# Patient Record
Sex: FEMALE | Race: WHITE | NOT HISPANIC OR LATINO | Employment: FULL TIME | URBAN - METROPOLITAN AREA
[De-identification: names, ages, dates, MRNs, and addresses within clinical notes are randomized per-mention and may not be internally consistent; named-entity substitution may affect disease eponyms.]

---

## 2017-01-31 ENCOUNTER — OFFICE VISIT (OUTPATIENT)
Dept: OBSTETRICS AND GYNECOLOGY | Facility: CLINIC | Age: 27
End: 2017-01-31
Payer: MEDICAID

## 2017-01-31 VITALS
HEART RATE: 95 BPM | BODY MASS INDEX: 36.51 KG/M2 | HEIGHT: 64 IN | DIASTOLIC BLOOD PRESSURE: 81 MMHG | SYSTOLIC BLOOD PRESSURE: 116 MMHG | WEIGHT: 213.88 LBS

## 2017-01-31 DIAGNOSIS — L65.9 HAIR LOSS: ICD-10-CM

## 2017-01-31 DIAGNOSIS — R10.2 PELVIC PAIN: ICD-10-CM

## 2017-01-31 DIAGNOSIS — R53.83 FATIGUE, UNSPECIFIED TYPE: ICD-10-CM

## 2017-01-31 DIAGNOSIS — N64.4 BREAST PAIN, LEFT: Primary | ICD-10-CM

## 2017-01-31 DIAGNOSIS — N64.52 NIPPLE DISCHARGE: ICD-10-CM

## 2017-01-31 PROCEDURE — 99203 OFFICE O/P NEW LOW 30 MIN: CPT | Mod: PBBFAC,PO | Performed by: NURSE PRACTITIONER

## 2017-01-31 PROCEDURE — 99999 PR PBB SHADOW E&M-NEW PATIENT-LVL III: CPT | Mod: PBBFAC,,, | Performed by: NURSE PRACTITIONER

## 2017-01-31 PROCEDURE — 99202 OFFICE O/P NEW SF 15 MIN: CPT | Mod: S$PBB,,, | Performed by: NURSE PRACTITIONER

## 2017-01-31 RX ORDER — LORAZEPAM 2 MG/1
0.5 TABLET ORAL EVERY 12 HOURS PRN
COMMUNITY
End: 2017-10-02

## 2017-01-31 RX ORDER — VENLAFAXINE HYDROCHLORIDE 75 MG/1
75 CAPSULE, EXTENDED RELEASE ORAL DAILY
COMMUNITY
End: 2017-10-02
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
## 2017-02-02 ENCOUNTER — PATIENT MESSAGE (OUTPATIENT)
Dept: OBSTETRICS AND GYNECOLOGY | Facility: CLINIC | Age: 27
End: 2017-02-02

## 2017-02-07 ENCOUNTER — HOSPITAL ENCOUNTER (OUTPATIENT)
Dept: RADIOLOGY | Facility: HOSPITAL | Age: 27
Discharge: HOME | End: 2017-02-07
Attending: NURSE PRACTITIONER
Payer: MEDICAID

## 2017-02-07 DIAGNOSIS — N64.4 BREAST PAIN: ICD-10-CM

## 2017-02-07 DIAGNOSIS — N64.4 BREAST PAIN, LEFT: ICD-10-CM

## 2017-02-07 DIAGNOSIS — N64.52 NIPPLE DISCHARGE IN FEMALE: ICD-10-CM

## 2017-02-07 DIAGNOSIS — R10.2 PELVIC PAIN: ICD-10-CM

## 2017-02-07 DIAGNOSIS — N64.52 NIPPLE DISCHARGE: ICD-10-CM

## 2017-02-07 PROCEDURE — 76642 ULTRASOUND BREAST LIMITED: CPT | Mod: 26,LT,, | Performed by: RADIOLOGY

## 2017-02-07 PROCEDURE — 76856 US EXAM PELVIC COMPLETE: CPT | Mod: 26,,, | Performed by: RADIOLOGY

## 2017-02-07 PROCEDURE — 76642 ULTRASOUND BREAST LIMITED: CPT | Mod: TC,LT

## 2017-02-07 PROCEDURE — 76830 TRANSVAGINAL US NON-OB: CPT | Mod: 26,,, | Performed by: RADIOLOGY

## 2017-02-07 PROCEDURE — 76856 US EXAM PELVIC COMPLETE: CPT | Mod: TC

## 2017-02-08 ENCOUNTER — PATIENT MESSAGE (OUTPATIENT)
Dept: OBSTETRICS AND GYNECOLOGY | Facility: CLINIC | Age: 27
End: 2017-02-08

## 2017-02-09 ENCOUNTER — PATIENT MESSAGE (OUTPATIENT)
Dept: OBSTETRICS AND GYNECOLOGY | Facility: CLINIC | Age: 27
End: 2017-02-09

## 2017-02-20 ENCOUNTER — PATIENT MESSAGE (OUTPATIENT)
Dept: OBSTETRICS AND GYNECOLOGY | Facility: CLINIC | Age: 27
End: 2017-02-20

## 2017-03-10 ENCOUNTER — APPOINTMENT (OUTPATIENT)
Dept: LAB | Facility: HOSPITAL | Age: 27
End: 2017-03-10
Attending: OBSTETRICS & GYNECOLOGY
Payer: MEDICAID

## 2017-03-10 ENCOUNTER — OFFICE VISIT (OUTPATIENT)
Dept: OBSTETRICS AND GYNECOLOGY | Facility: CLINIC | Age: 27
End: 2017-03-10
Payer: MEDICAID

## 2017-03-10 VITALS
DIASTOLIC BLOOD PRESSURE: 72 MMHG | TEMPERATURE: 99 F | WEIGHT: 225.5 LBS | HEART RATE: 100 BPM | BODY MASS INDEX: 38.5 KG/M2 | HEIGHT: 64 IN | SYSTOLIC BLOOD PRESSURE: 113 MMHG

## 2017-03-10 DIAGNOSIS — D07.1 VIN III (VULVAR INTRAEPITHELIAL NEOPLASIA III): ICD-10-CM

## 2017-03-10 DIAGNOSIS — N76.3 CHRONIC VULVITIS: ICD-10-CM

## 2017-03-10 DIAGNOSIS — Z01.419 WELL WOMAN EXAM WITH ROUTINE GYNECOLOGICAL EXAM: Primary | ICD-10-CM

## 2017-03-10 PROCEDURE — 88305 TISSUE EXAM BY PATHOLOGIST: CPT | Mod: 26,,, | Performed by: PATHOLOGY

## 2017-03-10 PROCEDURE — 88175 CYTOPATH C/V AUTO FLUID REDO: CPT

## 2017-03-10 PROCEDURE — 56821 COLPOSCOPY VULVA W/BIOPSY: CPT | Mod: S$PBB,,, | Performed by: OBSTETRICS & GYNECOLOGY

## 2017-03-10 PROCEDURE — 56821 COLPOSCOPY VULVA W/BIOPSY: CPT | Mod: PBBFAC,PO | Performed by: OBSTETRICS & GYNECOLOGY

## 2017-03-10 PROCEDURE — 99999 PR PBB SHADOW E&M-EST. PATIENT-LVL III: CPT | Mod: PBBFAC,,, | Performed by: OBSTETRICS & GYNECOLOGY

## 2017-03-10 PROCEDURE — 88305 TISSUE EXAM BY PATHOLOGIST: CPT | Performed by: PATHOLOGY

## 2017-03-10 PROCEDURE — 88312 SPECIAL STAINS GROUP 1: CPT | Mod: 26,,, | Performed by: PATHOLOGY

## 2017-03-10 PROCEDURE — 99499 UNLISTED E&M SERVICE: CPT | Mod: S$PBB,,, | Performed by: OBSTETRICS & GYNECOLOGY

## 2017-03-10 PROCEDURE — 99213 OFFICE O/P EST LOW 20 MIN: CPT | Mod: PBBFAC,PO,25 | Performed by: OBSTETRICS & GYNECOLOGY

## 2017-03-10 RX ORDER — HYDROCODONE BITARTRATE AND ACETAMINOPHEN 5; 325 MG/1; MG/1
1 TABLET ORAL EVERY 6 HOURS PRN
Qty: 20 TABLET | Refills: 0 | Status: SHIPPED | OUTPATIENT
Start: 2017-03-10 | End: 2017-10-02

## 2017-03-10 NOTE — PROGRESS NOTES
COLPOSCOPY:    Rosa Charles is a 26 y.o. female   presents for colposcopy of the vulva.  Patient's last menstrual period was 2017.. She has a history of BREE III and is having vulvar pain and redness like her last diagnosis.      The abnormal test findings were discussed, as well as HPV infection, need for colposcopy and possible biopsies to determine the plan of care, treatments available, the minimal risk of bleeding and infection with colposcopy, and alternatives to colposcopy and she agrees to proceed.      UPT is negative    COLPOSCOPY EXAM:   TIME OUT PERFORMED.     visible lesion(s) at left introitus    Biopsy was taken.  Pap was done  Hemostasis was obtained with stitch    The patient did tolerate the procedure well.    All collected specimens sent to pathology for histologic analysis.    Post-colposcopy counseling:  The patient was instructed to manage post-colposcopy cramping with NSAIDs or Tylenol, or with a prescription per the medication card.  Avoid intercourse, douching, or tampons in the vagina for at least 2-3 days.  Expect a clumpy blackish discharge due to Monsel's solution application for several days.  Report heavy bleeding, worsening pain or pain that does not respond to above medications, or foul-smelling vaginal discharge. HPV vaccine recommended according to FDA age guidelines.  Importance of follow-up stressed.      Follow up based on colposcopy results.

## 2017-03-16 ENCOUNTER — OFFICE VISIT (OUTPATIENT)
Dept: OBSTETRICS AND GYNECOLOGY | Facility: CLINIC | Age: 27
End: 2017-03-16
Payer: MEDICAID

## 2017-03-16 VITALS
WEIGHT: 223.19 LBS | SYSTOLIC BLOOD PRESSURE: 115 MMHG | BODY MASS INDEX: 38.31 KG/M2 | DIASTOLIC BLOOD PRESSURE: 78 MMHG | HEART RATE: 91 BPM

## 2017-03-16 DIAGNOSIS — Z98.890 POST-OPERATIVE STATE: Primary | ICD-10-CM

## 2017-03-16 PROCEDURE — 99212 OFFICE O/P EST SF 10 MIN: CPT | Mod: PBBFAC,PO | Performed by: OBSTETRICS & GYNECOLOGY

## 2017-03-16 PROCEDURE — 99024 POSTOP FOLLOW-UP VISIT: CPT | Mod: ,,, | Performed by: OBSTETRICS & GYNECOLOGY

## 2017-03-16 PROCEDURE — 99999 PR PBB SHADOW E&M-EST. PATIENT-LVL II: CPT | Mod: PBBFAC,,, | Performed by: OBSTETRICS & GYNECOLOGY

## 2017-03-16 RX ORDER — METRONIDAZOLE 500 MG/1
TABLET ORAL
Qty: 4 TABLET | Refills: 1 | Status: SHIPPED | OUTPATIENT
Start: 2017-03-16 | End: 2017-07-27 | Stop reason: SDUPTHER

## 2017-03-16 RX ORDER — CLOBETASOL PROPIONATE 0.5 MG/G
OINTMENT TOPICAL 2 TIMES DAILY
Qty: 30 G | Refills: 0 | Status: SHIPPED | OUTPATIENT
Start: 2017-03-16 | End: 2017-10-02

## 2017-03-16 NOTE — PROGRESS NOTES
Subjective:       Patient ID: Rosa Charles is a 26 y.o. female.    Chief Complaint:  Follow-up and Vaginal Pain (still having pain in vaginal area more discomfort in whole area)      History of Present Illness  HPI  Pt here for biopsy check.  Pathology is not back, culture showed trichomonas    GYN & OB History  Patient's last menstrual period was 2017.   Date of Last Pap: 3/16/2017    OB History    Para Term  AB SAB TAB Ectopic Multiple Living   3 2 0 2 0 0 0 0 0 3      # Outcome Date GA Lbr Haim/2nd Weight Sex Delivery Anes PTL Lv   3       Vag-Spont   Y   2       Vag-Spont   Y   1       Vag-Spont   Y          Review of Systems  Review of Systems   Constitutional: Negative.    Respiratory: Negative.    Cardiovascular: Negative.    Gastrointestinal: Negative.    Genitourinary: Negative.    Musculoskeletal: Negative.    Skin:  Negative.   Neurological: Negative.    Psychiatric/Behavioral: Negative.            Objective:    Physical Exam:   Constitutional: She is oriented to person, place, and time. She appears well-developed and well-nourished.    HENT:   Head: Normocephalic and atraumatic.    Eyes: EOM are normal.    Neck: Normal range of motion.    Cardiovascular: Normal rate.     Pulmonary/Chest: Effort normal.          Genitourinary: Lesion: stitch removed, one left, healing well.           Musculoskeletal: Normal range of motion and moves all extremeties.       Neurological: She is alert and oriented to person, place, and time.    Skin: Skin is warm and dry.    Psychiatric: She has a normal mood and affect. Her behavior is normal. Judgment and thought content normal.          Assessment:        1. Post-operative state                Plan:      Steroid cream  Awaiting path

## 2017-03-18 ENCOUNTER — PATIENT MESSAGE (OUTPATIENT)
Dept: OBSTETRICS AND GYNECOLOGY | Facility: CLINIC | Age: 27
End: 2017-03-18

## 2017-06-01 ENCOUNTER — PATIENT MESSAGE (OUTPATIENT)
Dept: OBSTETRICS AND GYNECOLOGY | Facility: CLINIC | Age: 27
End: 2017-06-01

## 2017-07-27 RX ORDER — METRONIDAZOLE 500 MG/1
TABLET ORAL
Qty: 4 TABLET | Refills: 1 | Status: SHIPPED | OUTPATIENT
Start: 2017-07-27 | End: 2017-10-02

## 2017-08-18 ENCOUNTER — PATIENT MESSAGE (OUTPATIENT)
Dept: OBSTETRICS AND GYNECOLOGY | Facility: CLINIC | Age: 27
End: 2017-08-18

## 2017-09-15 NOTE — TELEPHONE ENCOUNTER
----- Message from Jovana Jade sent at 9/15/2017  3:54 PM CDT -----  Patient requesting medication:Sean (for migraine)be called into [facility] pharmacy in [place]. Please order and call patient back at [phone] to confirm. Thanks!

## 2017-09-16 RX ORDER — BUTALBITAL, ACETAMINOPHEN AND CAFFEINE 50; 325; 40 MG/1; MG/1; MG/1
TABLET ORAL
Qty: 30 TABLET | Refills: 0 | OUTPATIENT
Start: 2017-09-16

## 2017-09-16 RX ORDER — BUTALBITAL, ACETAMINOPHEN AND CAFFEINE 50; 325; 40 MG/1; MG/1; MG/1
1 TABLET ORAL DAILY PRN
Refills: 0 | OUTPATIENT
Start: 2017-09-16 | End: 2017-10-16

## 2017-09-21 ENCOUNTER — TELEPHONE (OUTPATIENT)
Dept: PRIMARY CARE CLINIC | Facility: CLINIC | Age: 27
End: 2017-09-21

## 2017-09-21 NOTE — TELEPHONE ENCOUNTER
----- Message from Julita Barth sent at 9/21/2017 12:43 PM CDT -----  Contact: Patient  Rosa, patient [phone], Calling for a refill on Rx Fiorecet .  Please advise. Thanks.  Pharmacy is [facility]'s   [facility] [address], [place] [address]  Phone [phone]

## 2017-09-21 NOTE — TELEPHONE ENCOUNTER
----- Message from Julita Barth sent at 9/21/2017 12:43 PM CDT -----  Contact: Patient  Rosa, patient [phone], Calling for a refill on Rx Fiorecet .  Please advise. Thanks.  Pharmacy is [facility]'s   [facility] [address], [place] [address]  Phone [phone]

## 2017-09-26 RX ORDER — BUTALBITAL, ACETAMINOPHEN AND CAFFEINE 50; 325; 40 MG/1; MG/1; MG/1
TABLET ORAL
Refills: 0 | OUTPATIENT
Start: 2017-09-26

## 2017-09-26 RX ORDER — BUTALBITAL, ACETAMINOPHEN AND CAFFEINE 50; 325; 40 MG/1; MG/1; MG/1
TABLET ORAL
Refills: 0 | COMMUNITY
Start: 2017-07-07 | End: 2018-02-05

## 2017-09-26 NOTE — TELEPHONE ENCOUNTER
----- Message from Johana Herrera sent at 9/26/2017 12:33 PM CDT -----  Contact: Patient  Rosa, patient [phone], Calling for refill on Rx Fioricet. Please advise. Thanks.        [facility] [id number]  [place] [address] AT [facility] [place] [address]  Phone: [phone] Fax: [phone]

## 2017-09-29 ENCOUNTER — TELEPHONE (OUTPATIENT)
Dept: PRIMARY CARE CLINIC | Facility: CLINIC | Age: 27
End: 2017-09-29

## 2017-09-29 NOTE — TELEPHONE ENCOUNTER
----- Message from Julita Barth sent at 9/29/2017 11:38 AM CDT -----  Contact: Patient  Rosa, patient [phone], Pillo for a refill on Rx butalbital-acetaminophen-caffeine -40 mg (FIORICET, ESGIC) per tablet. Has made several request for this Rx to be refilled.   IM to office, Saadia. Please advise. Thanks.  [facility] [address] [address] [place] [address]  Phone: [phone] Fax: [phone]

## 2017-10-02 ENCOUNTER — OFFICE VISIT (OUTPATIENT)
Dept: PRIMARY CARE CLINIC | Facility: CLINIC | Age: 27
End: 2017-10-02
Payer: MEDICAID

## 2017-10-02 VITALS
TEMPERATURE: 99 F | OXYGEN SATURATION: 98 % | SYSTOLIC BLOOD PRESSURE: 114 MMHG | HEART RATE: 102 BPM | HEIGHT: 64 IN | DIASTOLIC BLOOD PRESSURE: 80 MMHG | RESPIRATION RATE: 18 BRPM | WEIGHT: 210.31 LBS | BODY MASS INDEX: 35.9 KG/M2

## 2017-10-02 DIAGNOSIS — G47.00 INSOMNIA, UNSPECIFIED TYPE: ICD-10-CM

## 2017-10-02 DIAGNOSIS — F43.10 PTSD (POST-TRAUMATIC STRESS DISORDER): ICD-10-CM

## 2017-10-02 DIAGNOSIS — F41.9 ANXIETY: ICD-10-CM

## 2017-10-02 DIAGNOSIS — F43.20 ADJUSTMENT DISORDER, UNSPECIFIED TYPE: ICD-10-CM

## 2017-10-02 DIAGNOSIS — R51.9 NONINTRACTABLE HEADACHE, UNSPECIFIED CHRONICITY PATTERN, UNSPECIFIED HEADACHE TYPE: ICD-10-CM

## 2017-10-02 DIAGNOSIS — N30.00 ACUTE CYSTITIS WITHOUT HEMATURIA: Primary | ICD-10-CM

## 2017-10-02 DIAGNOSIS — F32.A DEPRESSION, UNSPECIFIED DEPRESSION TYPE: ICD-10-CM

## 2017-10-02 LAB
BILIRUB SERPL-MCNC: NEGATIVE MG/DL
BLOOD URINE, POC: NEGATIVE
COLOR, POC UA: YELLOW
GLUCOSE UR QL STRIP: NEGATIVE
KETONES UR QL STRIP: NEGATIVE
LEUKOCYTE ESTERASE URINE, POC: ABNORMAL
NITRITE, POC UA: NEGATIVE
PH, POC UA: 5
PROTEIN, POC: ABNORMAL
SPECIFIC GRAVITY, POC UA: 1.03
UROBILINOGEN, POC UA: NEGATIVE

## 2017-10-02 PROCEDURE — 99213 OFFICE O/P EST LOW 20 MIN: CPT | Mod: S$PBB,,, | Performed by: INTERNAL MEDICINE

## 2017-10-02 PROCEDURE — 99213 OFFICE O/P EST LOW 20 MIN: CPT | Mod: PBBFAC,PN | Performed by: INTERNAL MEDICINE

## 2017-10-02 PROCEDURE — 81002 URINALYSIS NONAUTO W/O SCOPE: CPT | Mod: PBBFAC,PN | Performed by: INTERNAL MEDICINE

## 2017-10-02 PROCEDURE — 99999 PR PBB SHADOW E&M-EST. PATIENT-LVL III: CPT | Mod: PBBFAC,,, | Performed by: INTERNAL MEDICINE

## 2017-10-02 RX ORDER — TRAZODONE HYDROCHLORIDE 50 MG/1
50 TABLET ORAL NIGHTLY PRN
Qty: 30 TABLET | Refills: 1 | Status: SHIPPED | OUTPATIENT
Start: 2017-10-02 | End: 2018-02-05

## 2017-10-02 RX ORDER — PHENAZOPYRIDINE HYDROCHLORIDE 100 MG/1
100 TABLET, FILM COATED ORAL 3 TIMES DAILY PRN
Qty: 15 TABLET | Refills: 0 | Status: SHIPPED | OUTPATIENT
Start: 2017-10-02 | End: 2017-10-12

## 2017-10-02 RX ORDER — SULFAMETHOXAZOLE AND TRIMETHOPRIM 800; 160 MG/1; MG/1
1 TABLET ORAL 2 TIMES DAILY
Qty: 20 TABLET | Refills: 0 | Status: SHIPPED | OUTPATIENT
Start: 2017-10-02 | End: 2017-10-12

## 2017-10-02 RX ORDER — BUTALBITAL, ACETAMINOPHEN AND CAFFEINE 50; 325; 40 MG/1; MG/1; MG/1
1 TABLET ORAL EVERY 6 HOURS PRN
Qty: 30 TABLET | Refills: 1 | Status: SHIPPED | OUTPATIENT
Start: 2017-10-02 | End: 2017-11-01

## 2017-10-02 RX ORDER — VENLAFAXINE HYDROCHLORIDE 75 MG/1
75 CAPSULE, EXTENDED RELEASE ORAL DAILY
Qty: 30 CAPSULE | Refills: 11 | Status: SHIPPED | OUTPATIENT
Start: 2017-10-02 | End: 2018-02-05

## 2017-10-02 RX ORDER — ESCITALOPRAM OXALATE 20 MG/1
20 TABLET ORAL DAILY
COMMUNITY
End: 2017-10-02 | Stop reason: SDUPTHER

## 2017-10-03 NOTE — PROGRESS NOTES
Subjective:       Patient ID: Rosa Charles is a 26 y.o. female.    Chief Complaint: Medication Refill; Urinary Tract Infection; and Insomnia    HPI   Pt c/o dysuria nad urinary frequency no fever chill no sob cp on n/v/d and insomnia anxiety depression has to send 3 children to ex  used to see psych no fever chill not pregnant had BTL  Review of Systems    Objective:      Physical Exam   Constitutional: She is oriented to person, place, and time. She appears well-developed and well-nourished. No distress.   HENT:   Head: Normocephalic and atraumatic.   Right Ear: External ear normal.   Left Ear: External ear normal.   Nose: Nose normal.   Mouth/Throat: Oropharynx is clear and moist. No oropharyngeal exudate.   Eyes: Conjunctivae and EOM are normal. Pupils are equal, round, and reactive to light. Right eye exhibits no discharge. Left eye exhibits no discharge.   Neck: Normal range of motion. Neck supple. No thyromegaly present.   Cardiovascular: Normal rate, regular rhythm, normal heart sounds and intact distal pulses.  Exam reveals no gallop and no friction rub.    No murmur heard.  Pulmonary/Chest: Effort normal and breath sounds normal. No respiratory distress. She has no wheezes. She has no rales. She exhibits no tenderness.   Abdominal: Soft. Bowel sounds are normal. She exhibits no distension. There is no tenderness. There is no rebound and no guarding.   Musculoskeletal: Normal range of motion. She exhibits no edema, tenderness or deformity.   Lymphadenopathy:     She has no cervical adenopathy.   Neurological: She is alert and oriented to person, place, and time.   Skin: Skin is warm and dry. Capillary refill takes less than 2 seconds. No rash noted. No erythema.   Psychiatric: She has a normal mood and affect. Judgment and thought content normal.   Nursing note and vitals reviewed.      Assessment:       1. Acute cystitis without hematuria    2. PTSD (post-traumatic stress disorder)    3.  Anxiety    4. Depression, unspecified depression type    5. Nonintractable headache, unspecified chronicity pattern, unspecified headache type    6. Insomnia, unspecified type        Plan:       Acute cystitis without hematuria  -     sulfamethoxazole-trimethoprim 800-160mg (BACTRIM DS) 800-160 mg Tab; Take 1 tablet by mouth 2 (two) times daily.  Dispense: 20 tablet; Refill: 0  -     phenazopyridine (PYRIDIUM) 100 MG tablet; Take 1 tablet (100 mg total) by mouth 3 (three) times daily as needed for Pain.  Dispense: 15 tablet; Refill: 0  -     POCT URINE DIPSTICK WITHOUT MICROSCOPE    PTSD (post-traumatic stress disorder)    Anxiety    Depression, unspecified depression type  -     venlafaxine (EFFEXOR-XR) 75 MG 24 hr capsule; Take 1 capsule (75 mg total) by mouth once daily.  Dispense: 30 capsule; Refill: 11    Nonintractable headache, unspecified chronicity pattern, unspecified headache type  -     butalbital-acetaminophen-caffeine -40 mg (FIORICET, ESGIC) -40 mg per tablet; Take 1 tablet by mouth every 6 (six) hours as needed for Pain.  Dispense: 30 tablet; Refill: 1    Insomnia, unspecified type  -     trazodone (DESYREL) 50 MG tablet; Take 1 tablet (50 mg total) by mouth nightly as needed for Insomnia.  Dispense: 30 tablet; Refill: 1

## 2017-11-14 ENCOUNTER — TELEPHONE (OUTPATIENT)
Dept: PRIMARY CARE CLINIC | Facility: CLINIC | Age: 27
End: 2017-11-14

## 2017-11-14 DIAGNOSIS — L30.9 ECZEMA, UNSPECIFIED TYPE: Primary | ICD-10-CM

## 2017-11-15 RX ORDER — TRIAMCINOLONE ACETONIDE 1 MG/G
CREAM TOPICAL 2 TIMES DAILY
Qty: 45 G | Refills: 1 | Status: SHIPPED | OUTPATIENT
Start: 2017-11-15 | End: 2018-02-05

## 2017-12-12 ENCOUNTER — TELEPHONE (OUTPATIENT)
Dept: OBSTETRICS AND GYNECOLOGY | Facility: CLINIC | Age: 27
End: 2017-12-12

## 2017-12-12 ENCOUNTER — OFFICE VISIT (OUTPATIENT)
Dept: OBSTETRICS AND GYNECOLOGY | Facility: CLINIC | Age: 27
End: 2017-12-12
Payer: MEDICAID

## 2017-12-12 VITALS
HEIGHT: 64 IN | WEIGHT: 204.13 LBS | SYSTOLIC BLOOD PRESSURE: 120 MMHG | DIASTOLIC BLOOD PRESSURE: 68 MMHG | BODY MASS INDEX: 34.85 KG/M2

## 2017-12-12 DIAGNOSIS — R87.810 ASCUS WITH POSITIVE HIGH RISK HPV CERVICAL: Primary | ICD-10-CM

## 2017-12-12 DIAGNOSIS — R87.610 ASCUS WITH POSITIVE HIGH RISK HPV CERVICAL: Primary | ICD-10-CM

## 2017-12-12 PROCEDURE — 99213 OFFICE O/P EST LOW 20 MIN: CPT | Mod: PBBFAC,PN | Performed by: OBSTETRICS & GYNECOLOGY

## 2017-12-12 PROCEDURE — 99213 OFFICE O/P EST LOW 20 MIN: CPT | Mod: S$PBB,,, | Performed by: OBSTETRICS & GYNECOLOGY

## 2017-12-12 PROCEDURE — 99999 PR PBB SHADOW E&M-EST. PATIENT-LVL III: CPT | Mod: PBBFAC,,, | Performed by: OBSTETRICS & GYNECOLOGY

## 2017-12-12 NOTE — TELEPHONE ENCOUNTER
----- Message from Damaris Cash sent at 12/12/2017 11:52 AM CST -----  Contact: Patient   C _1st Request  _  2nd Request  _  3rd Request    Who:JACINTO HOLM [[id number]]    Why:Patient states she needs to reschedule her time from 12/19/2017 AT 10/:15AM to al later time     What Number to Call Back:[phone]    When to Expect a call back: (Before the end of the day)   -- if call after 3:00 call back will be tomorrow.

## 2017-12-12 NOTE — TELEPHONE ENCOUNTER
----- Message from Jamaica Hernandez sent at 12/12/2017  1:50 PM CST -----  Contact: JACINTO HOLM [[id number]]  _  1st Request  _  2nd Request  _  3rd Request        Who: JACINTO HOLM [[id number]]    Why: patient states she is returning a call     What Number to Call Back: [phone]    When to Expect a call back: (Before the end of the day)   -- if call after 3:00 call back will be tomorrow.

## 2017-12-12 NOTE — TELEPHONE ENCOUNTER
Returned pt call and pt was confirming about for colpo. Gave pt date and time. Pt verbalized understanding
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
## 2017-12-12 NOTE — LETTER
12/12/2017                 [facility]  [address], [facility] [address] [place] [address]  Phone: [phone]  Fax: [phone]   12/12/2017    Patient: Rosa Charles   YOB: 1990   Date of Visit: 12/12/2017       To Whom it May Concern:    Rosa Charles was seen in my clinic on 12/12/2017. She can return to work on 12/12/2017.    If you have any questions or concerns, please don't hesitate to call.    Sincerely,         Elke Jenkins MD

## 2017-12-19 ENCOUNTER — OFFICE VISIT (OUTPATIENT)
Dept: OBSTETRICS AND GYNECOLOGY | Facility: CLINIC | Age: 27
End: 2017-12-19
Payer: MEDICAID

## 2017-12-19 VITALS
HEIGHT: 64 IN | SYSTOLIC BLOOD PRESSURE: 110 MMHG | BODY MASS INDEX: 35.38 KG/M2 | WEIGHT: 207.25 LBS | DIASTOLIC BLOOD PRESSURE: 80 MMHG

## 2017-12-19 DIAGNOSIS — R87.810 ASCUS WITH POSITIVE HIGH RISK HPV CERVICAL: Primary | ICD-10-CM

## 2017-12-19 DIAGNOSIS — R87.610 ASCUS WITH POSITIVE HIGH RISK HPV CERVICAL: Primary | ICD-10-CM

## 2017-12-19 LAB
B-HCG UR QL: NEGATIVE
CTP QC/QA: YES

## 2017-12-19 PROCEDURE — 81025 URINE PREGNANCY TEST: CPT | Mod: PBBFAC,PN | Performed by: OBSTETRICS & GYNECOLOGY

## 2017-12-19 PROCEDURE — 57456 ENDOCERV CURETTAGE W/SCOPE: CPT | Mod: PBBFAC,PN | Performed by: OBSTETRICS & GYNECOLOGY

## 2017-12-19 PROCEDURE — 88305 TISSUE EXAM BY PATHOLOGIST: CPT | Mod: 26,,, | Performed by: PATHOLOGY

## 2017-12-19 PROCEDURE — 57456 ENDOCERV CURETTAGE W/SCOPE: CPT | Mod: S$PBB,,, | Performed by: OBSTETRICS & GYNECOLOGY

## 2017-12-19 PROCEDURE — 99999 PR PBB SHADOW E&M-EST. PATIENT-LVL III: CPT | Mod: PBBFAC,,, | Performed by: OBSTETRICS & GYNECOLOGY

## 2017-12-19 PROCEDURE — 99499 UNLISTED E&M SERVICE: CPT | Mod: S$PBB,,, | Performed by: OBSTETRICS & GYNECOLOGY

## 2017-12-19 PROCEDURE — 99213 OFFICE O/P EST LOW 20 MIN: CPT | Mod: PBBFAC,25,PN | Performed by: OBSTETRICS & GYNECOLOGY

## 2017-12-19 PROCEDURE — 88305 TISSUE EXAM BY PATHOLOGIST: CPT | Performed by: PATHOLOGY

## 2017-12-19 NOTE — PROCEDURES
Colposcopy  Date/Time: 2017 11:39 AM  Performed by: CHRIS BELTRAN  Authorized by: CHRIS BELTRAN     Consent Done?:  Yes (Written)    Colposcopy Site:  Cervix  Acrowhite Lesion: No    Atypical Vessels: No    Transformation Zone Adequate?: No    Biopsy?: No    ECC Performed?: Yes     Patient tolerated the procedure well with no immediate complications.   Post-operative instructions were provided for the patient.      27 y.o.   presents for colposcopy.    Patient's last menstrual period was 11/15/2017.    Pap:  ASCUS with POSITIVE high risk HPV    Patient was counseled on the abnormal test findings were discussed, as well as HPV infection, need for colposcopy and possible biopsies to determine the plan of care, and treatments available.    Patient was counseled on the risks of procedure including pain, bleeding, or infection.  Patient acknowledged risks, and all questions were answered.     UPT is negative.      COLPOSCOPIC EXAM    Time out performed.  Cervix visualized with speculum.  Acetic acid applied to cervix.     Findings: no visible lesions  ECC:  was performed  Biopsy: None    Monsel's: was not applied    The patient tolerated the procedure well.    All collected specimens sent to pathology for histologic analysis.      POST-COLPOSCOPY COUNSELING    The patient was instructed to  - manage post-colposcopy cramping with NSAIDs or Tylenol  - avoid intercourse, douching, or tampons in the vagina for at least 2-3 days  - expect a clumpy blackish discharge due to Monsel's solution application for several days  - report heavy bleeding, worsening pain or pain that does not respond to above medications, or foul-smelling vaginal discharge    HPV vaccine recommended according to FDA age guidelines.    Importance of follow-up stressed.

## 2017-12-20 ENCOUNTER — PATIENT MESSAGE (OUTPATIENT)
Dept: OBSTETRICS AND GYNECOLOGY | Facility: CLINIC | Age: 27
End: 2017-12-20

## 2018-01-02 ENCOUNTER — PATIENT MESSAGE (OUTPATIENT)
Dept: OBSTETRICS AND GYNECOLOGY | Facility: CLINIC | Age: 28
End: 2018-01-02

## 2018-01-05 ENCOUNTER — PATIENT MESSAGE (OUTPATIENT)
Dept: OBSTETRICS AND GYNECOLOGY | Facility: CLINIC | Age: 28
End: 2018-01-05

## 2018-01-09 ENCOUNTER — TELEPHONE (OUTPATIENT)
Dept: OBSTETRICS AND GYNECOLOGY | Facility: CLINIC | Age: 28
End: 2018-01-09

## 2018-01-09 NOTE — TELEPHONE ENCOUNTER
Returned pt call and pt stated that she is having breast pain. Scheduled pt with  on 1/11/2018. Pt verbalized understanding

## 2018-01-09 NOTE — TELEPHONE ENCOUNTER
----- Message from Jessica Rice sent at 1/9/2018 12:23 PM CST -----  _x  1st Request  _  2nd Request  _  3rd Request        Who: rene    Why: pt.calling to speak with dr. chiu  Or nurse pt. Stating she is experiencing breast tinderness with discharge. Please call to discuss    What Number to Call Back:[phone]    When to Expect a call back: (Before the end of the day)   -- if the call is after 12:00, the call back will be tomorrow.

## 2018-04-02 ENCOUNTER — PATIENT MESSAGE (OUTPATIENT)
Dept: PRIMARY CARE CLINIC | Facility: CLINIC | Age: 28
End: 2018-04-02

## 2018-04-02 ENCOUNTER — OFFICE VISIT (OUTPATIENT)
Dept: PRIMARY CARE CLINIC | Facility: CLINIC | Age: 28
End: 2018-04-02
Payer: MEDICAID

## 2018-04-02 VITALS
SYSTOLIC BLOOD PRESSURE: 122 MMHG | OXYGEN SATURATION: 98 % | DIASTOLIC BLOOD PRESSURE: 82 MMHG | RESPIRATION RATE: 18 BRPM | HEART RATE: 91 BPM | HEIGHT: 65 IN | TEMPERATURE: 99 F | BODY MASS INDEX: 34.51 KG/M2 | WEIGHT: 207.13 LBS

## 2018-04-02 DIAGNOSIS — R00.2 INTERMITTENT PALPITATIONS: Primary | ICD-10-CM

## 2018-04-02 DIAGNOSIS — R51.9 NONINTRACTABLE HEADACHE, UNSPECIFIED CHRONICITY PATTERN, UNSPECIFIED HEADACHE TYPE: ICD-10-CM

## 2018-04-02 DIAGNOSIS — R07.89 ATYPICAL CHEST PAIN: ICD-10-CM

## 2018-04-02 PROCEDURE — 99213 OFFICE O/P EST LOW 20 MIN: CPT | Mod: S$PBB,,, | Performed by: INTERNAL MEDICINE

## 2018-04-02 PROCEDURE — 99999 PR PBB SHADOW E&M-EST. PATIENT-LVL III: CPT | Mod: PBBFAC,,, | Performed by: INTERNAL MEDICINE

## 2018-04-02 PROCEDURE — 99213 OFFICE O/P EST LOW 20 MIN: CPT | Mod: PBBFAC,PN | Performed by: INTERNAL MEDICINE

## 2018-04-02 RX ORDER — BUTALBITAL, ACETAMINOPHEN AND CAFFEINE 50; 325; 40 MG/1; MG/1; MG/1
1 TABLET ORAL EVERY 6 HOURS PRN
Qty: 30 TABLET | Refills: 0 | Status: SHIPPED | OUTPATIENT
Start: 2018-04-02 | End: 2018-05-02

## 2018-04-03 ENCOUNTER — CLINICAL SUPPORT (OUTPATIENT)
Dept: PRIMARY CARE CLINIC | Facility: CLINIC | Age: 28
End: 2018-04-03
Payer: MEDICAID

## 2018-04-03 DIAGNOSIS — R00.2 INTERMITTENT PALPITATIONS: ICD-10-CM

## 2018-04-03 LAB
T4 FREE SERPL-MCNC: 1.08 NG/DL
TSH SERPL DL<=0.005 MIU/L-ACNC: 0.89 UIU/ML

## 2018-04-03 PROCEDURE — 99999 PR PBB SHADOW E&M-EST. PATIENT-LVL II: CPT | Mod: PBBFAC,,,

## 2018-04-03 PROCEDURE — 99212 OFFICE O/P EST SF 10 MIN: CPT | Mod: PBBFAC,PN

## 2018-04-03 PROCEDURE — 84443 ASSAY THYROID STIM HORMONE: CPT

## 2018-04-03 PROCEDURE — 84439 ASSAY OF FREE THYROXINE: CPT

## 2018-04-03 NOTE — PROGRESS NOTES
Subjective:       Patient ID: Rosa Charles is a 27 y.o. female.    Chief Complaint: Heart Palpatations    HPI  Pt c/o heart palapitaion onand off few weeks went to ER had EKG labs ok send hoe still withpalpaitaions on and off and tuightness no cp n n/v/d no sob not on and meds or energy drink etcno fever chill no n/v/d not pregnant  Review of Systems    Objective:      Physical Exam   Constitutional: She is oriented to person, place, and time. She appears well-developed and well-nourished. No distress.   HENT:   Head: Normocephalic and atraumatic.   Right Ear: External ear normal.   Left Ear: External ear normal.   Nose: Nose normal.   Mouth/Throat: Oropharynx is clear and moist. No oropharyngeal exudate.   Eyes: Conjunctivae and EOM are normal. Pupils are equal, round, and reactive to light. Right eye exhibits no discharge. Left eye exhibits no discharge.   Neck: Normal range of motion. Neck supple. No thyromegaly present.   Cardiovascular: Normal rate, regular rhythm, normal heart sounds and intact distal pulses.  Exam reveals no gallop and no friction rub.    No murmur heard.  Pulmonary/Chest: Effort normal and breath sounds normal. No respiratory distress. She has no wheezes. She has no rales. She exhibits no tenderness.   Abdominal: Soft. Bowel sounds are normal. She exhibits no distension. There is no tenderness. There is no rebound and no guarding.   Musculoskeletal: Normal range of motion. She exhibits no edema, tenderness or deformity.   Lymphadenopathy:     She has no cervical adenopathy.   Neurological: She is alert and oriented to person, place, and time.   Skin: Skin is warm and dry. Capillary refill takes less than 2 seconds. No rash noted. No erythema.   Psychiatric: She has a normal mood and affect. Judgment and thought content normal.   Nursing note and vitals reviewed.      Assessment:       1. Intermittent palpitations    2. Atypical chest pain    3. Nonintractable headache, unspecified  chronicity pattern, unspecified headache type        Plan:       Intermittent palpitations  Comments:  will hld tx until finish w/u  Orders:  -     Transthoracic echo (TTE) complete ([facility] ONLY); Future; Expected date: 04/02/2018  -     TSH; Future; Expected date: 04/02/2018  -     T4, free; Future; Expected date: 04/02/2018    Atypical chest pain  -     Holter monitor - 72 hour ([facility] ONLY); Future; Expected date: 04/02/2018  -     EKG 12-lead; Future; Expected date: 04/02/2018    Nonintractable headache, unspecified chronicity pattern, unspecified headache type  -     butalbital-acetaminophen-caffeine -40 mg (FIORICET, ESGIC) -40 mg per tablet; Take 1 tablet by mouth every 6 (six) hours as needed for Pain.  Dispense: 30 tablet; Refill: 0

## 2018-04-09 ENCOUNTER — TELEPHONE (OUTPATIENT)
Dept: PRIMARY CARE CLINIC | Facility: CLINIC | Age: 28
End: 2018-04-09

## 2018-04-09 NOTE — TELEPHONE ENCOUNTER
Notified patient with normal results. States would like to know what she is to do next because she states that her heart still skips a beat,

## 2018-04-12 ENCOUNTER — PATIENT MESSAGE (OUTPATIENT)
Dept: PRIMARY CARE CLINIC | Facility: CLINIC | Age: 28
End: 2018-04-12

## 2018-04-13 ENCOUNTER — OFFICE VISIT (OUTPATIENT)
Dept: PRIMARY CARE CLINIC | Facility: CLINIC | Age: 28
End: 2018-04-13
Payer: MEDICAID

## 2018-04-13 ENCOUNTER — TELEPHONE (OUTPATIENT)
Dept: PRIMARY CARE CLINIC | Facility: CLINIC | Age: 28
End: 2018-04-13

## 2018-04-13 VITALS
TEMPERATURE: 99 F | RESPIRATION RATE: 18 BRPM | HEART RATE: 87 BPM | SYSTOLIC BLOOD PRESSURE: 104 MMHG | OXYGEN SATURATION: 99 % | BODY MASS INDEX: 34.97 KG/M2 | DIASTOLIC BLOOD PRESSURE: 70 MMHG | HEIGHT: 64 IN | WEIGHT: 204.81 LBS

## 2018-04-13 DIAGNOSIS — R53.82 CHRONIC FATIGUE: Primary | ICD-10-CM

## 2018-04-13 DIAGNOSIS — N30.00 ACUTE CYSTITIS WITHOUT HEMATURIA: ICD-10-CM

## 2018-04-13 PROCEDURE — 99213 OFFICE O/P EST LOW 20 MIN: CPT | Mod: PBBFAC,PN | Performed by: INTERNAL MEDICINE

## 2018-04-13 PROCEDURE — 99213 OFFICE O/P EST LOW 20 MIN: CPT | Mod: S$PBB,,, | Performed by: INTERNAL MEDICINE

## 2018-04-13 PROCEDURE — 99999 PR PBB SHADOW E&M-EST. PATIENT-LVL III: CPT | Mod: PBBFAC,,, | Performed by: INTERNAL MEDICINE

## 2018-04-13 RX ORDER — SULFAMETHOXAZOLE AND TRIMETHOPRIM 800; 160 MG/1; MG/1
1 TABLET ORAL 2 TIMES DAILY
Qty: 20 TABLET | Refills: 0 | Status: SHIPPED | OUTPATIENT
Start: 2018-04-13 | End: 2018-04-23

## 2018-04-13 RX ORDER — PHENAZOPYRIDINE HYDROCHLORIDE 200 MG/1
200 TABLET, FILM COATED ORAL 3 TIMES DAILY PRN
Qty: 15 TABLET | Refills: 1 | Status: SHIPPED | OUTPATIENT
Start: 2018-04-13 | End: 2018-04-23

## 2018-04-13 NOTE — TELEPHONE ENCOUNTER
----- Message from Elin Morgan sent at 4/13/2018  4:39 PM CDT -----  Contact: self   Patient want to know if you can recommend her something over the counter for UTI cant fill her rx you gave her until next week please call back at [phone]

## 2018-04-14 NOTE — PROGRESS NOTES
Subjective:       Patient ID: Rosa Charles is a 27 y.o. female.    Chief Complaint: Follow-up (from ER WBC in urine)    HPI  Pt is here for f/u from ER with UT given po abx but did not take has urinary frequency had 50 WBC/HPF in ER no fever chill no n/v/d  And still c/o fatigue no energy  Review of Systems    Objective:      Physical Exam   Constitutional: She is oriented to person, place, and time. She appears well-developed and well-nourished. No distress.   HENT:   Head: Normocephalic and atraumatic.   Right Ear: External ear normal.   Left Ear: External ear normal.   Nose: Nose normal.   Mouth/Throat: Oropharynx is clear and moist. No oropharyngeal exudate.   Eyes: Conjunctivae and EOM are normal. Pupils are equal, round, and reactive to light. Right eye exhibits no discharge. Left eye exhibits no discharge.   Neck: Normal range of motion. Neck supple. No thyromegaly present.   Cardiovascular: Normal rate, regular rhythm, normal heart sounds and intact distal pulses.  Exam reveals no gallop and no friction rub.    No murmur heard.  Pulmonary/Chest: Effort normal and breath sounds normal. No respiratory distress. She has no wheezes. She has no rales. She exhibits no tenderness.   Abdominal: Soft. Bowel sounds are normal. She exhibits no distension. There is no tenderness. There is no rebound and no guarding.   Musculoskeletal: Normal range of motion. She exhibits no edema, tenderness or deformity.   Lymphadenopathy:     She has no cervical adenopathy.   Neurological: She is alert and oriented to person, place, and time.   Skin: Skin is warm and dry. Capillary refill takes less than 2 seconds. No rash noted. No erythema.   Psychiatric: She has a normal mood and affect. Judgment and thought content normal.   Nursing note and vitals reviewed.      Assessment:       1. Chronic fatigue    2. Acute cystitis without hematuria        Plan:       Chronic fatigue  -     ELAINE; Future; Expected date: 04/13/2018  -      Rheumatoid factor; Future; Expected date: 04/13/2018  -     Sedimentation rate, manual; Future; Expected date: 04/13/2018  -     EMORY-BARR VIRUS ANTIBODY PANEL; Future; Expected date: 04/13/2018    Acute cystitis without hematuria  -     sulfamethoxazole-trimethoprim 800-160mg (BACTRIM DS) 800-160 mg Tab; Take 1 tablet by mouth 2 (two) times daily.  Dispense: 20 tablet; Refill: 0  -     phenazopyridine (PYRIDIUM) 200 MG tablet; Take 1 tablet (200 mg total) by mouth 3 (three) times daily as needed for Pain.  Dispense: 15 tablet; Refill: 1

## 2018-04-25 ENCOUNTER — TELEPHONE (OUTPATIENT)
Dept: PRIMARY CARE CLINIC | Facility: CLINIC | Age: 28
End: 2018-04-25

## 2018-04-25 NOTE — TELEPHONE ENCOUNTER
----- Message from Johana Sharon sent at 4/25/2018  3:14 PM CDT -----  Contact: Patient  Cindy, patient [phone], Calling because Rx Phenazopyridine 200 mg is not helping with the pain of the urinary tract infection. Is there something else she can take. Please call her. Thanks.      [facility] [employer] [address] [place] [address] JUANITA NELSON  [address] AT [facility] [address]  [address] [address]  Phone: [phone] Fax: [phone]

## 2018-04-27 ENCOUNTER — TELEPHONE (OUTPATIENT)
Dept: PRIMARY CARE CLINIC | Facility: CLINIC | Age: 28
End: 2018-04-27

## 2018-04-27 ENCOUNTER — PATIENT MESSAGE (OUTPATIENT)
Dept: PRIMARY CARE CLINIC | Facility: CLINIC | Age: 28
End: 2018-04-27

## 2018-04-27 NOTE — TELEPHONE ENCOUNTER
----- Message from Lynn Muriel sent at 4/27/2018 12:30 PM CDT -----  Contact: Rosa  Patient is calling again as has called several times but no call backs regarding UTI and is not better with medication given. Still having discomfort and pain in lower back/right side.     [facility] [employer] [id number] - JUANITA NELSON - [address] AT [facility] [address]  [address] [address]  Phone: [phone] Fax: [phone]    Please call patient today [phone]. Thanks!

## 2018-04-27 NOTE — TELEPHONE ENCOUNTER
Spoke to pt. And explained per Dr. Morocho that he wants her to come in for a follow up to do a repeat urine and possibly get referred to a urologist. Pt. Understood.

## 2018-04-27 NOTE — TELEPHONE ENCOUNTER
----- Message from Grace Schmid sent at 4/26/2018  5:24 PM CDT -----  Contact: Pt  Pt would like to be called back regarding medication     Pt can be reached at [phone]

## 2018-04-30 ENCOUNTER — PATIENT MESSAGE (OUTPATIENT)
Dept: OBSTETRICS AND GYNECOLOGY | Facility: CLINIC | Age: 28
End: 2018-04-30

## 2018-05-01 ENCOUNTER — OFFICE VISIT (OUTPATIENT)
Dept: PRIMARY CARE CLINIC | Facility: CLINIC | Age: 28
End: 2018-05-01
Payer: MEDICAID

## 2018-05-01 VITALS
HEART RATE: 82 BPM | OXYGEN SATURATION: 97 % | HEIGHT: 65 IN | DIASTOLIC BLOOD PRESSURE: 88 MMHG | TEMPERATURE: 99 F | BODY MASS INDEX: 34.54 KG/M2 | SYSTOLIC BLOOD PRESSURE: 119 MMHG | RESPIRATION RATE: 18 BRPM | WEIGHT: 207.31 LBS

## 2018-05-01 DIAGNOSIS — R53.82 CHRONIC FATIGUE: ICD-10-CM

## 2018-05-01 DIAGNOSIS — M25.551 PAIN OF BOTH HIP JOINTS: Primary | ICD-10-CM

## 2018-05-01 DIAGNOSIS — M25.552 PAIN OF BOTH HIP JOINTS: Primary | ICD-10-CM

## 2018-05-01 DIAGNOSIS — N30.00 ACUTE CYSTITIS WITHOUT HEMATURIA: ICD-10-CM

## 2018-05-01 DIAGNOSIS — R76.8 ELEVATED RHEUMATOID FACTOR: ICD-10-CM

## 2018-05-01 PROCEDURE — 99999 PR PBB SHADOW E&M-EST. PATIENT-LVL IV: CPT | Mod: PBBFAC,,, | Performed by: INTERNAL MEDICINE

## 2018-05-01 PROCEDURE — 99214 OFFICE O/P EST MOD 30 MIN: CPT | Mod: PBBFAC,PN | Performed by: INTERNAL MEDICINE

## 2018-05-01 PROCEDURE — 81002 URINALYSIS NONAUTO W/O SCOPE: CPT | Mod: PBBFAC,PN | Performed by: INTERNAL MEDICINE

## 2018-05-01 PROCEDURE — 99213 OFFICE O/P EST LOW 20 MIN: CPT | Mod: S$PBB,,, | Performed by: INTERNAL MEDICINE

## 2018-05-01 RX ORDER — METHYLPREDNISOLONE 4 MG/1
TABLET ORAL
Qty: 1 PACKAGE | Refills: 0 | Status: SHIPPED | OUTPATIENT
Start: 2018-05-01 | End: 2018-07-06

## 2018-05-01 RX ORDER — TRAMADOL HYDROCHLORIDE 50 MG/1
50 TABLET ORAL EVERY 8 HOURS PRN
Qty: 40 TABLET | Refills: 0 | Status: SHIPPED | OUTPATIENT
Start: 2018-05-01 | End: 2018-05-11

## 2018-05-02 NOTE — PROGRESS NOTES
Subjective:       Patient ID: Rosa Charles is a 27 y.o. female.    Chief Complaint: Results    HPI   Pt c/o body ache and pain from back down no trauma no weakness UTI better no fever chill labs oos EB virus Abd IgG negative IgM high RF and stillw ith chronic fatigue  Review of Systems    Objective:      Physical Exam   Constitutional: She is oriented to person, place, and time. She appears well-developed and well-nourished. No distress.   HENT:   Head: Normocephalic and atraumatic.   Right Ear: External ear normal.   Left Ear: External ear normal.   Nose: Nose normal.   Mouth/Throat: Oropharynx is clear and moist. No oropharyngeal exudate.   Eyes: Conjunctivae and EOM are normal. Pupils are equal, round, and reactive to light. Right eye exhibits no discharge. Left eye exhibits no discharge.   Neck: Normal range of motion. Neck supple. No thyromegaly present.   Cardiovascular: Normal rate, regular rhythm, normal heart sounds and intact distal pulses.  Exam reveals no gallop and no friction rub.    No murmur heard.  Pulmonary/Chest: Effort normal and breath sounds normal. No respiratory distress. She has no wheezes. She has no rales. She exhibits no tenderness.   Abdominal: Soft. Bowel sounds are normal. She exhibits no distension. There is no tenderness. There is no rebound and no guarding.   Musculoskeletal: Normal range of motion. She exhibits no edema, tenderness or deformity.   Lymphadenopathy:     She has no cervical adenopathy.   Neurological: She is alert and oriented to person, place, and time.   Skin: Skin is warm and dry. Capillary refill takes less than 2 seconds. No rash noted. No erythema.   Psychiatric: She has a normal mood and affect. Judgment and thought content normal.   Nursing note and vitals reviewed.      Assessment:       1. Pain of both hip joints    2. Chronic fatigue    3. Acute cystitis without hematuria        Plan:       Pain of both hip joints  -     traMADol (ULTRAM) 50 mg tablet;  Take 1 tablet (50 mg total) by mouth every 8 (eight) hours as needed.  Dispense: 40 tablet; Refill: 0  -     methylPREDNISolone (MEDROL DOSEPACK) 4 mg tablet; use as directed  Dispense: 1 Package; Refill: 0    Chronic fatigue  -     Ambulatory referral to Rheumatology  -     Ambulatory referral to Infectious Disease    Acute cystitis without hematuria  -     POCT URINE DIPSTICK WITHOUT MICROSCOPE

## 2018-05-03 ENCOUNTER — TELEPHONE (OUTPATIENT)
Dept: PRIMARY CARE CLINIC | Facility: CLINIC | Age: 28
End: 2018-05-03

## 2018-05-03 NOTE — TELEPHONE ENCOUNTER
Please address message accordingly and sign and close encoounter      ----- Message from Jesica Rizvi sent at 5/3/2018  4:48 PM CDT -----  Type: Needs Medical Advice    Who Called:  Patient  Best Call Back Number: [phone]  Additional Information: Doctor had referred patient to a rheumatologist (she did not remember the name). The first appointment was 10/9/18. She states she wants to be seen sooner and wants office to refer her to another one.

## 2018-05-03 NOTE — TELEPHONE ENCOUNTER
----- Message from Jesica Rizvi sent at 5/3/2018  4:48 PM CDT -----  Type: Needs Medical Advice    Who Called:  Patient  Best Call Back Number: [phone]  Additional Information: Doctor had referred patient to a rheumatologist (she did not remember the name). The first appointment was 10/9/18. She states she wants to be seen sooner and wants office to refer her to another one.

## 2018-05-04 ENCOUNTER — CLINICAL SUPPORT (OUTPATIENT)
Dept: PRIMARY CARE CLINIC | Facility: CLINIC | Age: 28
End: 2018-05-04
Payer: MEDICAID

## 2018-05-04 DIAGNOSIS — R53.82 CHRONIC FATIGUE: Primary | ICD-10-CM

## 2018-05-04 LAB
BILIRUB SERPL-MCNC: ABNORMAL MG/DL
BLOOD URINE, POC: ABNORMAL
COLOR, POC UA: YELLOW
GLUCOSE UR QL STRIP: NORMAL
KETONES UR QL STRIP: ABNORMAL
LEUKOCYTE ESTERASE URINE, POC: ABNORMAL
NITRITE, POC UA: ABNORMAL
PH, POC UA: 7
PROTEIN, POC: ABNORMAL
SPECIFIC GRAVITY, POC UA: 1.01
UROBILINOGEN, POC UA: 1

## 2018-05-04 PROCEDURE — 86038 ANTINUCLEAR ANTIBODIES: CPT

## 2018-05-04 PROCEDURE — 99212 OFFICE O/P EST SF 10 MIN: CPT | Mod: PBBFAC,PN

## 2018-05-04 PROCEDURE — 99999 PR PBB SHADOW E&M-EST. PATIENT-LVL II: CPT | Mod: PBBFAC,,,

## 2018-05-04 NOTE — TELEPHONE ENCOUNTER
Spoke with patient states that ELAINE was not drawn when was suppose to. However, coming in today to get that done.

## 2018-05-04 NOTE — TELEPHONE ENCOUNTER
Spoke with patient, notified that she would have to find another rheumatologist that takes her insurance and call to let me know so that I can get another referral put in for her.

## 2018-05-04 NOTE — TELEPHONE ENCOUNTER
----- Message from Lynn Llamas sent at 5/4/2018  8:19 AM CDT -----  Contact: Rosa  Patient is calling as from Tuesday lab orders were to be entered due to result from 4/25/18 testing was not returned/possibly lost result. Please call [phone]. Thanks!

## 2018-05-07 LAB — ANA SER QL IF: NORMAL

## 2018-05-17 ENCOUNTER — PATIENT MESSAGE (OUTPATIENT)
Dept: OBSTETRICS AND GYNECOLOGY | Facility: CLINIC | Age: 28
End: 2018-05-17

## 2018-06-13 ENCOUNTER — PATIENT MESSAGE (OUTPATIENT)
Dept: PRIMARY CARE CLINIC | Facility: CLINIC | Age: 28
End: 2018-06-13

## 2018-06-13 DIAGNOSIS — M54.50 ACUTE MIDLINE LOW BACK PAIN WITHOUT SCIATICA: ICD-10-CM

## 2018-06-14 NOTE — TELEPHONE ENCOUNTER
Pt need hip xray and can call in mobic 15 mg po qd # 20 no refill see how she does if not better need orthopedist or rheumatologist need xray first

## 2018-06-16 ENCOUNTER — PATIENT MESSAGE (OUTPATIENT)
Dept: PRIMARY CARE CLINIC | Facility: CLINIC | Age: 28
End: 2018-06-16

## 2018-06-18 RX ORDER — MELOXICAM 15 MG/1
15 TABLET ORAL DAILY
Qty: 20 TABLET | Refills: 0 | Status: SHIPPED | OUTPATIENT
Start: 2018-06-18 | End: 2018-10-05 | Stop reason: SDUPTHER

## 2018-06-28 ENCOUNTER — TELEPHONE (OUTPATIENT)
Dept: TRANSPLANT | Facility: CLINIC | Age: 28
End: 2018-06-28

## 2018-06-28 DIAGNOSIS — Z00.5 TRANSPLANT DONOR EVALUATION: Primary | ICD-10-CM

## 2018-06-28 NOTE — TELEPHONE ENCOUNTER
Rosa Melvin called and stated that she is interested in becoming a living donor for her friend/coworker, Zayda Rice.  Medical and social history obtained.  No contraindications noted.  All questions answered.  Crossmatch has been scheduled. Education book emailed to patient.

## 2018-07-05 ENCOUNTER — PATIENT MESSAGE (OUTPATIENT)
Dept: PRIMARY CARE CLINIC | Facility: CLINIC | Age: 28
End: 2018-07-05

## 2018-07-06 ENCOUNTER — OFFICE VISIT (OUTPATIENT)
Dept: PRIMARY CARE CLINIC | Facility: CLINIC | Age: 28
End: 2018-07-06
Payer: MEDICAID

## 2018-07-06 VITALS
OXYGEN SATURATION: 97 % | RESPIRATION RATE: 18 BRPM | BODY MASS INDEX: 35.53 KG/M2 | HEART RATE: 96 BPM | HEIGHT: 64 IN | WEIGHT: 208.13 LBS | TEMPERATURE: 99 F | SYSTOLIC BLOOD PRESSURE: 138 MMHG | DIASTOLIC BLOOD PRESSURE: 81 MMHG

## 2018-07-06 DIAGNOSIS — R30.0 DYSURIA: Primary | ICD-10-CM

## 2018-07-06 DIAGNOSIS — R51.9 NONINTRACTABLE HEADACHE, UNSPECIFIED CHRONICITY PATTERN, UNSPECIFIED HEADACHE TYPE: ICD-10-CM

## 2018-07-06 DIAGNOSIS — J06.9 UPPER RESPIRATORY TRACT INFECTION, UNSPECIFIED TYPE: ICD-10-CM

## 2018-07-06 PROCEDURE — 99999 PR PBB SHADOW E&M-EST. PATIENT-LVL IV: CPT | Mod: PBBFAC,TXP,, | Performed by: INTERNAL MEDICINE

## 2018-07-06 PROCEDURE — 99214 OFFICE O/P EST MOD 30 MIN: CPT | Mod: PBBFAC,PN,TXP | Performed by: INTERNAL MEDICINE

## 2018-07-06 PROCEDURE — 99213 OFFICE O/P EST LOW 20 MIN: CPT | Mod: S$PBB,NTX,, | Performed by: INTERNAL MEDICINE

## 2018-07-06 RX ORDER — LEVOCETIRIZINE DIHYDROCHLORIDE 5 MG/1
5 TABLET, FILM COATED ORAL NIGHTLY
Qty: 30 TABLET | Refills: 0 | Status: SHIPPED | OUTPATIENT
Start: 2018-07-06 | End: 2018-07-06 | Stop reason: SDUPTHER

## 2018-07-06 RX ORDER — CIPROFLOXACIN 500 MG/1
500 TABLET ORAL EVERY 12 HOURS
Qty: 20 TABLET | Refills: 0 | Status: SHIPPED | OUTPATIENT
Start: 2018-07-06 | End: 2018-07-16

## 2018-07-06 RX ORDER — BENZONATATE 200 MG/1
200 CAPSULE ORAL 3 TIMES DAILY PRN
Qty: 30 CAPSULE | Refills: 0 | Status: SHIPPED | OUTPATIENT
Start: 2018-07-06 | End: 2018-07-16

## 2018-07-06 RX ORDER — BUTALBITAL, ACETAMINOPHEN AND CAFFEINE 50; 325; 40 MG/1; MG/1; MG/1
1 TABLET ORAL EVERY 6 HOURS PRN
Qty: 30 TABLET | Refills: 0 | Status: SHIPPED | OUTPATIENT
Start: 2018-07-06 | End: 2018-08-05

## 2018-07-06 RX ORDER — BENZONATATE 200 MG/1
200 CAPSULE ORAL 3 TIMES DAILY PRN
Qty: 30 CAPSULE | Refills: 0 | Status: SHIPPED | OUTPATIENT
Start: 2018-07-06 | End: 2018-07-06 | Stop reason: SDUPTHER

## 2018-07-06 RX ORDER — CIPROFLOXACIN 500 MG/1
500 TABLET ORAL EVERY 12 HOURS
Qty: 20 TABLET | Refills: 0 | Status: SHIPPED | OUTPATIENT
Start: 2018-07-06 | End: 2018-07-06 | Stop reason: SDUPTHER

## 2018-07-06 RX ORDER — LEVOCETIRIZINE DIHYDROCHLORIDE 5 MG/1
5 TABLET, FILM COATED ORAL NIGHTLY
Qty: 30 TABLET | Refills: 0 | Status: SHIPPED | OUTPATIENT
Start: 2018-07-06 | End: 2018-08-01

## 2018-07-06 NOTE — LETTER
July 6, 2018      [facility]  [address] [place] [address]  Phone: [phone]  Fax: [phone]       Patient: Rosa Charles   YOB: 1990  Date of Visit: 07/06/2018    To Whom It May Concern:    Grant Charles  was at [facility] on 07/06/2018. She may return to work on 07/09/2018 with no restrictions. If you have any questions or concerns, or if I can be of further assistance, please do not hesitate to contact me.    Sincerely,    Vidya Aguilera LPN

## 2018-07-07 NOTE — PROGRESS NOTES
Subjective:       Patient ID: Rosa Charles is a 27 y.o. female.    Chief Complaint: URI and Urinary Tract Infection    HPI  Pt c/o headache tension on and off request refill medications and dysuria no vag d/c no itching no blood in urine also having upper airway symptoms coughing congestion no sob cp no high fever  Review of Systems    Objective:      Physical Exam   Constitutional: She is oriented to person, place, and time. She appears well-developed and well-nourished. No distress.   HENT:   Head: Normocephalic and atraumatic.   Right Ear: External ear normal.   Left Ear: External ear normal.   Mouth/Throat: Oropharynx is clear and moist. No oropharyngeal exudate.   Nasal congestion   Eyes: Conjunctivae and EOM are normal. Pupils are equal, round, and reactive to light. Right eye exhibits no discharge. Left eye exhibits no discharge.   Neck: Normal range of motion. Neck supple. No thyromegaly present.   Cardiovascular: Normal rate, regular rhythm, normal heart sounds and intact distal pulses.  Exam reveals no gallop and no friction rub.    No murmur heard.  Pulmonary/Chest: Effort normal and breath sounds normal. No respiratory distress. She has no wheezes. She has no rales. She exhibits no tenderness.   Abdominal: Soft. Bowel sounds are normal. She exhibits no distension. There is no tenderness. There is no rebound and no guarding.   Musculoskeletal: Normal range of motion. She exhibits no edema, tenderness or deformity.   Lymphadenopathy:     She has no cervical adenopathy.   Neurological: She is alert and oriented to person, place, and time.   Skin: Skin is warm and dry. Capillary refill takes less than 2 seconds. No rash noted. No erythema.   Psychiatric: She has a normal mood and affect. Judgment and thought content normal.   Nursing note and vitals reviewed.      Assessment:       1. Dysuria    2. Upper respiratory tract infection, unspecified type    3. Nonintractable headache, unspecified  chronicity pattern, unspecified headache type        Plan:       Dysuria  -     Discontinue: ciprofloxacin HCl (CIPRO) 500 MG tablet; Take 1 tablet (500 mg total) by mouth every 12 (twelve) hours. for 10 days  Dispense: 20 tablet; Refill: 0  -     ciprofloxacin HCl (CIPRO) 500 MG tablet; Take 1 tablet (500 mg total) by mouth every 12 (twelve) hours. for 10 days  Dispense: 20 tablet; Refill: 0    Upper respiratory tract infection, unspecified type  -     Discontinue: ciprofloxacin HCl (CIPRO) 500 MG tablet; Take 1 tablet (500 mg total) by mouth every 12 (twelve) hours. for 10 days  Dispense: 20 tablet; Refill: 0  -     Discontinue: benzonatate (TESSALON) 200 MG capsule; Take 1 capsule (200 mg total) by mouth 3 (three) times daily as needed. Prn coughing  Dispense: 30 capsule; Refill: 0  -     Discontinue: levocetirizine (XYZAL) 5 MG tablet; Take 1 tablet (5 mg total) by mouth every evening. Allergy nasal drip  Dispense: 30 tablet; Refill: 0  -     ciprofloxacin HCl (CIPRO) 500 MG tablet; Take 1 tablet (500 mg total) by mouth every 12 (twelve) hours. for 10 days  Dispense: 20 tablet; Refill: 0  -     levocetirizine (XYZAL) 5 MG tablet; Take 1 tablet (5 mg total) by mouth every evening. Allergy nasal drip  Dispense: 30 tablet; Refill: 0  -     benzonatate (TESSALON) 200 MG capsule; Take 1 capsule (200 mg total) by mouth 3 (three) times daily as needed. Prn coughing  Dispense: 30 capsule; Refill: 0    Nonintractable headache, unspecified chronicity pattern, unspecified headache type  -     butalbital-acetaminophen-caffeine -40 mg (FIORICET, ESGIC) -40 mg per tablet; Take 1 tablet by mouth every 6 (six) hours as needed.  Dispense: 30 tablet; Refill: 0

## 2018-07-08 ENCOUNTER — PATIENT MESSAGE (OUTPATIENT)
Dept: PRIMARY CARE CLINIC | Facility: CLINIC | Age: 28
End: 2018-07-08

## 2018-07-09 DIAGNOSIS — R51.9 NONINTRACTABLE HEADACHE, UNSPECIFIED CHRONICITY PATTERN, UNSPECIFIED HEADACHE TYPE: ICD-10-CM

## 2018-07-09 RX ORDER — BUTALBITAL, ACETAMINOPHEN AND CAFFEINE 50; 325; 40 MG/1; MG/1; MG/1
1 TABLET ORAL EVERY 6 HOURS PRN
Qty: 30 TABLET | Refills: 0 | OUTPATIENT
Start: 2018-07-09 | End: 2018-08-08

## 2018-07-09 RX ORDER — BUTALBITAL, ACETAMINOPHEN AND CAFFEINE 50; 325; 40 MG/1; MG/1; MG/1
1 TABLET ORAL EVERY 6 HOURS PRN
Qty: 30 TABLET | Refills: 0 | Status: CANCELLED | OUTPATIENT
Start: 2018-07-09 | End: 2018-08-08

## 2018-07-10 ENCOUNTER — TELEPHONE (OUTPATIENT)
Dept: TRANSPLANT | Facility: CLINIC | Age: 28
End: 2018-07-10

## 2018-07-10 NOTE — TELEPHONE ENCOUNTER
Patient notified that the results of the crossmatch with CHRIS Rice shows that they are compatible. Informed that Zayda is currently undergoing additional testing. Once she is cleared for transplant, I will contact her to schedule the medical evaluation. Patient agreed and verbalized understanding.

## 2018-07-24 ENCOUNTER — PATIENT MESSAGE (OUTPATIENT)
Dept: PRIMARY CARE CLINIC | Facility: CLINIC | Age: 28
End: 2018-07-24

## 2018-08-01 ENCOUNTER — PATIENT MESSAGE (OUTPATIENT)
Dept: PRIMARY CARE CLINIC | Facility: CLINIC | Age: 28
End: 2018-08-01

## 2018-08-02 ENCOUNTER — PATIENT MESSAGE (OUTPATIENT)
Dept: PRIMARY CARE CLINIC | Facility: CLINIC | Age: 28
End: 2018-08-02

## 2018-08-06 ENCOUNTER — PATIENT MESSAGE (OUTPATIENT)
Dept: OBSTETRICS AND GYNECOLOGY | Facility: CLINIC | Age: 28
End: 2018-08-06

## 2018-08-24 ENCOUNTER — PATIENT MESSAGE (OUTPATIENT)
Dept: PRIMARY CARE CLINIC | Facility: CLINIC | Age: 28
End: 2018-08-24

## 2018-08-24 RX ORDER — BUTALBITAL, ACETAMINOPHEN AND CAFFEINE 50; 325; 40 MG/1; MG/1; MG/1
1 TABLET ORAL EVERY 6 HOURS PRN
Qty: 30 TABLET | Refills: 0 | Status: SHIPPED | OUTPATIENT
Start: 2018-08-24 | End: 2018-09-23

## 2018-09-18 ENCOUNTER — PATIENT MESSAGE (OUTPATIENT)
Dept: OBSTETRICS AND GYNECOLOGY | Facility: CLINIC | Age: 28
End: 2018-09-18

## 2018-09-20 ENCOUNTER — PATIENT MESSAGE (OUTPATIENT)
Dept: OBSTETRICS AND GYNECOLOGY | Facility: CLINIC | Age: 28
End: 2018-09-20

## 2018-09-20 ENCOUNTER — OFFICE VISIT (OUTPATIENT)
Dept: OBSTETRICS AND GYNECOLOGY | Facility: CLINIC | Age: 28
End: 2018-09-20
Payer: MEDICAID

## 2018-09-20 ENCOUNTER — TELEPHONE (OUTPATIENT)
Dept: OBSTETRICS AND GYNECOLOGY | Facility: CLINIC | Age: 28
End: 2018-09-20

## 2018-09-20 VITALS
SYSTOLIC BLOOD PRESSURE: 116 MMHG | DIASTOLIC BLOOD PRESSURE: 72 MMHG | BODY MASS INDEX: 34.23 KG/M2 | HEIGHT: 64 IN | WEIGHT: 200.5 LBS

## 2018-09-20 DIAGNOSIS — N89.8 VAGINAL DISCHARGE: Primary | ICD-10-CM

## 2018-09-20 DIAGNOSIS — M62.838 LEVATOR SPASM: ICD-10-CM

## 2018-09-20 PROCEDURE — 87660 TRICHOMONAS VAGIN DIR PROBE: CPT

## 2018-09-20 PROCEDURE — 99999 PR PBB SHADOW E&M-EST. PATIENT-LVL III: CPT | Mod: PBBFAC,,, | Performed by: OBSTETRICS & GYNECOLOGY

## 2018-09-20 PROCEDURE — 99213 OFFICE O/P EST LOW 20 MIN: CPT | Mod: PBBFAC,PN | Performed by: OBSTETRICS & GYNECOLOGY

## 2018-09-20 PROCEDURE — 99213 OFFICE O/P EST LOW 20 MIN: CPT | Mod: S$PBB,,, | Performed by: OBSTETRICS & GYNECOLOGY

## 2018-09-20 RX ORDER — METRONIDAZOLE 500 MG/1
500 TABLET ORAL 2 TIMES DAILY
Qty: 14 TABLET | Refills: 0 | Status: SHIPPED | OUTPATIENT
Start: 2018-09-20 | End: 2018-09-27

## 2018-09-20 NOTE — TELEPHONE ENCOUNTER
----- Message from Thais Worrell LPN sent at 9/20/2018 10:47 AM CDT -----  Contact: pt      ----- Message -----  From: Makenna Canseco  Sent: 9/20/2018  10:45 AM  To: Tra PACHECO Jr Staff    Pt is requesting to speak with a nurse to see if there's an earlier appt for today  Please call pt to advise  [phone]  Thanks

## 2018-09-20 NOTE — TELEPHONE ENCOUNTER
Spoke with pt and advised pt she can come earlier than her appt at 3:45. Pt verbalized understanding.

## 2018-09-20 NOTE — PROGRESS NOTES
PT HERE FOR IRRITATION OF THE AREA JUST INSIDE THE INTROITUS.  BEEN GOING ON FOR A LONG TIME. VULVA APPEARS INFLAMED..  PAINFUL TO SIT OR TOUCH. PAINFUL INTERCOURSE.    ROS:  GENERAL: No fever, chills, fatigability or weight loss.  VULVAR: No pain, no lesions and no itching.  VAGINAL: No relaxation, no itching, no discharge, no abnormal bleeding and no lesions.  ABDOMEN: No abdominal pain. Denies nausea. Denies vomiting. No diarrhea. No constipation  BREAST: Denies pain. No lumps. No discharge.  URINARY: No incontinence, no nocturia, no frequency and no dysuria.  CARDIOVASCULAR: No chest pain. No shortness of breath. No leg cramps.  NEUROLOGICAL: No headaches. No vision changes.  The remainder of the review of systems was negative.    PE:  General Appearance: overweight And Well developed. Well nourished. In no acute distress.  Vulva: Lesions: No.  Urethral Meatus: Normal size. Normal location. No lesions. No prolapse.  Urethra: No masses. No tenderness. No prolapse. No scarring.  Bladder: No masses. No tenderness.  Vagina: Mucosa NI:yes discharge yes, atrophy no, cystocele no or rectocele no. PALPATED B LEVATORS WITH TENSION AND REPRODUCED PAIN.  Cervix: Lesion: no  Stenotic: no Cervical motion tenderness: no    Uterus: Uterus size: 6 weeks. Support good. Uterus size: Normal  Adnexa: Masses: No Tenderness: No CDS Nodularity: No  Abdomen: overweight No masses. No tenderness.      PROCEDURES:    PLAN:     DIAGNOSIS:  1. Vaginal discharge    2. Levator spasm        MEDICATIONS & ORDERS:  Orders Placed This Encounter    Vaginosis Screen by DNA Probe    Ambulatory Referral to Physical/Occupational Therapy    metroNIDAZOLE (FLAGYL) 500 MG tablet       FOLLOW-UP: With me in PRN

## 2018-09-21 ENCOUNTER — PATIENT MESSAGE (OUTPATIENT)
Dept: OBSTETRICS AND GYNECOLOGY | Facility: CLINIC | Age: 28
End: 2018-09-21

## 2018-09-21 LAB
CANDIDA RRNA VAG QL PROBE: NEGATIVE
G VAGINALIS RRNA GENITAL QL PROBE: POSITIVE
T VAGINALIS RRNA GENITAL QL PROBE: POSITIVE

## 2018-10-03 ENCOUNTER — PATIENT MESSAGE (OUTPATIENT)
Dept: OBSTETRICS AND GYNECOLOGY | Facility: CLINIC | Age: 28
End: 2018-10-03

## 2018-10-05 ENCOUNTER — OFFICE VISIT (OUTPATIENT)
Dept: PRIMARY CARE CLINIC | Facility: CLINIC | Age: 28
End: 2018-10-05
Payer: MEDICAID

## 2018-10-05 VITALS
WEIGHT: 205 LBS | RESPIRATION RATE: 18 BRPM | BODY MASS INDEX: 35 KG/M2 | HEART RATE: 82 BPM | HEIGHT: 64 IN | SYSTOLIC BLOOD PRESSURE: 119 MMHG | DIASTOLIC BLOOD PRESSURE: 82 MMHG | OXYGEN SATURATION: 99 % | TEMPERATURE: 99 F

## 2018-10-05 DIAGNOSIS — R76.8 ELEVATED RHEUMATOID FACTOR: ICD-10-CM

## 2018-10-05 DIAGNOSIS — B30.9 ACUTE VIRAL CONJUNCTIVITIS, UNSPECIFIED LATERALITY: Primary | ICD-10-CM

## 2018-10-05 DIAGNOSIS — R05.9 COUGHING: ICD-10-CM

## 2018-10-05 DIAGNOSIS — R51.9 NONINTRACTABLE HEADACHE, UNSPECIFIED CHRONICITY PATTERN, UNSPECIFIED HEADACHE TYPE: ICD-10-CM

## 2018-10-05 PROCEDURE — 99213 OFFICE O/P EST LOW 20 MIN: CPT | Mod: S$PBB,NTX,, | Performed by: INTERNAL MEDICINE

## 2018-10-05 PROCEDURE — 99214 OFFICE O/P EST MOD 30 MIN: CPT | Mod: PBBFAC,PN,TXP | Performed by: INTERNAL MEDICINE

## 2018-10-05 PROCEDURE — 99999 PR PBB SHADOW E&M-EST. PATIENT-LVL IV: CPT | Mod: PBBFAC,TXP,, | Performed by: INTERNAL MEDICINE

## 2018-10-05 RX ORDER — BUTALBITAL, ACETAMINOPHEN AND CAFFEINE 50; 325; 40 MG/1; MG/1; MG/1
1 TABLET ORAL EVERY 6 HOURS PRN
Qty: 30 TABLET | Refills: 0 | Status: SHIPPED | OUTPATIENT
Start: 2018-10-05 | End: 2018-12-06 | Stop reason: SDUPTHER

## 2018-10-05 RX ORDER — MOXIFLOXACIN 5 MG/ML
1 SOLUTION/ DROPS OPHTHALMIC 3 TIMES DAILY
Qty: 3 ML | Refills: 0 | Status: SHIPPED | OUTPATIENT
Start: 2018-10-05 | End: 2018-11-15

## 2018-10-05 RX ORDER — BUTALBITAL, ACETAMINOPHEN AND CAFFEINE 50; 325; 40 MG/1; MG/1; MG/1
1 TABLET ORAL EVERY 4 HOURS PRN
COMMUNITY
End: 2018-10-05 | Stop reason: SDUPTHER

## 2018-10-05 RX ORDER — MELOXICAM 15 MG/1
15 TABLET ORAL DAILY
Qty: 30 TABLET | Refills: 3 | Status: SHIPPED | OUTPATIENT
Start: 2018-10-05 | End: 2019-01-08

## 2018-10-05 NOTE — LETTER
October 5, 2018      [facility]  [address] [place] [address]  Phone: [phone]  Fax: [phone]       Patient: Rosa Charles   YOB: 1990  Date of Visit: 10/05/2018    To Whom It May Concern:    Grant Charles  was at [facility] on 10/05/2018. She may return to work/school on 10/08/2018 with no restrictions. If you have any questions or concerns, or if I can be of further assistance, please do not hesitate to contact me.    Sincerely,    Saadia Lopes MA     
no

## 2018-10-07 NOTE — PROGRESS NOTES
Subjective:       Patient ID: Rosa Charles is a 27 y.o. female.    Chief Complaint: Conjunctivitis    HPI  Pt c/o pink eye was in left eye now also in rt eye no vision problems no pain no photophobia no fever chill has coughing for a while nonporoductive has appt with rheumatologist next week for evaluation pos RF and joint pain still with intermittent HA releived with medication  Review of Systems    Objective:      Physical Exam   Constitutional: She is oriented to person, place, and time. She appears well-developed and well-nourished. No distress.   HENT:   Head: Normocephalic and atraumatic.   Right Ear: External ear normal.   Left Ear: External ear normal.   Nose: Nose normal.   Mouth/Throat: Oropharynx is clear and moist. No oropharyngeal exudate.   Eyes: EOM are normal. Pupils are equal, round, and reactive to light. Right eye exhibits no discharge. Left eye exhibits no discharge.   Mod erythematous conjunctica left eye and mild erythematous rt eye   Neck: Normal range of motion. Neck supple. No thyromegaly present.   Cardiovascular: Normal rate, regular rhythm, normal heart sounds and intact distal pulses. Exam reveals no gallop and no friction rub.   No murmur heard.  Pulmonary/Chest: Effort normal and breath sounds normal. No respiratory distress. She has no wheezes. She has no rales. She exhibits no tenderness.   Abdominal: Soft. Bowel sounds are normal. She exhibits no distension. There is no tenderness. There is no rebound and no guarding.   Musculoskeletal: Normal range of motion. She exhibits no edema, tenderness or deformity.   Lymphadenopathy:     She has no cervical adenopathy.   Neurological: She is alert and oriented to person, place, and time.   Skin: Skin is warm and dry. Capillary refill takes less than 2 seconds. No rash noted. No erythema.   Psychiatric: She has a normal mood and affect. Judgment and thought content normal.   Nursing note and vitals reviewed.      Assessment:        1. Acute viral conjunctivitis, unspecified laterality    2. Nonintractable headache, unspecified chronicity pattern, unspecified headache type    3. Elevated rheumatoid factor    4. Coughing        Plan:       Acute viral conjunctivitis, unspecified laterality  -     moxifloxacin (VIGAMOX) 0.5 % ophthalmic solution; Place 1 drop into both eyes 3 (three) times daily.  Dispense: 3 mL; Refill: 0    Nonintractable headache, unspecified chronicity pattern, unspecified headache type  -     butalbital-acetaminophen-caffeine -40 mg (FIORICET, ESGIC) -40 mg per tablet; Take 1 tablet by mouth every 6 (six) hours as needed for Pain.  Dispense: 30 tablet; Refill: 0    Elevated rheumatoid factor  Comments:  has appt with rheumatologist next week  Orders:  -     meloxicam (MOBIC) 15 MG tablet; Take 1 tablet (15 mg total) by mouth once daily.  Dispense: 30 tablet; Refill: 3    Coughing  -     X-Ray Chest PA And Lateral; Future; Expected date: 10/05/2018

## 2018-10-09 ENCOUNTER — OFFICE VISIT (OUTPATIENT)
Dept: OBSTETRICS AND GYNECOLOGY | Facility: CLINIC | Age: 28
End: 2018-10-09
Payer: MEDICAID

## 2018-10-09 VITALS
SYSTOLIC BLOOD PRESSURE: 110 MMHG | HEIGHT: 64 IN | WEIGHT: 206.38 LBS | DIASTOLIC BLOOD PRESSURE: 68 MMHG | BODY MASS INDEX: 35.23 KG/M2

## 2018-10-09 DIAGNOSIS — Z20.2 POSSIBLE EXPOSURE TO STD: ICD-10-CM

## 2018-10-09 DIAGNOSIS — A59.01 VAGINITIS DUE TO TRICHOMONAS: Primary | ICD-10-CM

## 2018-10-09 LAB
CANDIDA RRNA VAG QL PROBE: NEGATIVE
G VAGINALIS RRNA GENITAL QL PROBE: POSITIVE
T VAGINALIS RRNA GENITAL QL PROBE: NEGATIVE

## 2018-10-09 PROCEDURE — 87491 CHLMYD TRACH DNA AMP PROBE: CPT

## 2018-10-09 PROCEDURE — 87660 TRICHOMONAS VAGIN DIR PROBE: CPT

## 2018-10-09 PROCEDURE — 99999 PR PBB SHADOW E&M-EST. PATIENT-LVL III: CPT | Mod: PBBFAC,,, | Performed by: OBSTETRICS & GYNECOLOGY

## 2018-10-09 PROCEDURE — 99213 OFFICE O/P EST LOW 20 MIN: CPT | Mod: S$PBB,,, | Performed by: OBSTETRICS & GYNECOLOGY

## 2018-10-09 PROCEDURE — 99213 OFFICE O/P EST LOW 20 MIN: CPT | Mod: PBBFAC,PN | Performed by: OBSTETRICS & GYNECOLOGY

## 2018-10-10 ENCOUNTER — PATIENT MESSAGE (OUTPATIENT)
Dept: OBSTETRICS AND GYNECOLOGY | Facility: CLINIC | Age: 28
End: 2018-10-10

## 2018-10-10 DIAGNOSIS — N76.0 ACUTE VAGINITIS: Primary | ICD-10-CM

## 2018-10-10 LAB
C TRACH DNA SPEC QL NAA+PROBE: NOT DETECTED
N GONORRHOEA DNA SPEC QL NAA+PROBE: NOT DETECTED

## 2018-10-10 RX ORDER — METRONIDAZOLE 500 MG/1
500 TABLET ORAL EVERY 12 HOURS
Qty: 14 TABLET | Refills: 0 | Status: SHIPPED | OUTPATIENT
Start: 2018-10-10 | End: 2018-10-17
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
## 2018-11-19 ENCOUNTER — PATIENT MESSAGE (OUTPATIENT)
Dept: PRIMARY CARE CLINIC | Facility: CLINIC | Age: 28
End: 2018-11-19

## 2018-11-19 DIAGNOSIS — Z20.2 EXPOSURE TO STD: Primary | ICD-10-CM

## 2018-11-20 ENCOUNTER — TELEPHONE (OUTPATIENT)
Dept: PRIMARY CARE CLINIC | Facility: CLINIC | Age: 28
End: 2018-11-20

## 2018-11-20 ENCOUNTER — PATIENT MESSAGE (OUTPATIENT)
Dept: PRIMARY CARE CLINIC | Facility: CLINIC | Age: 28
End: 2018-11-20

## 2018-11-20 NOTE — TELEPHONE ENCOUNTER
----- Message from Cristina Langford RT sent at 11/20/2018 11:10 AM CST -----  Contact: pt   pt , requesting an appt to be worked in today for her sciatic nerve pain issues, thanks.

## 2018-12-05 ENCOUNTER — TELEPHONE (OUTPATIENT)
Dept: TRANSPLANT | Facility: CLINIC | Age: 28
End: 2018-12-05

## 2018-12-05 NOTE — TELEPHONE ENCOUNTER
Patient notified that the recipient is now active on the waitlist. Confirmed that current weight is 201#, which puts her BMI over 33. Informed that testing cannot proceed until she has reached 190#. Patient agreed and will inform me once she reaches her goal.

## 2018-12-06 DIAGNOSIS — R51.9 NONINTRACTABLE HEADACHE, UNSPECIFIED CHRONICITY PATTERN, UNSPECIFIED HEADACHE TYPE: ICD-10-CM

## 2018-12-06 NOTE — TELEPHONE ENCOUNTER
----- Message from Jesica Rizvi sent at 12/6/2018  1:53 PM CST -----  Type:  RX Refill Request    Who Called:  Patient  Refill or New Rx:  refill  RX Name and Strength:  butalbital-acetaminophen-caffeine -40 mg (FIORICET, ESGIC) -40 mg per tablet   How is the patient currently taking it? (ex. 1XDay):  4xday  Is this a 30 day or 90 day RX:  30 pills  Preferred Pharmacy with phone number:    [id number] - [place] - [address] AT [facility] [address]  [address] [address]  Phone: [phone] Fax: [phone]  Local or Mail Order:  Local  Ordering Provider:  same  Best Call Back Number:  [phone]  Additional Information:  Please call when completed.

## 2018-12-07 RX ORDER — BUTALBITAL, ACETAMINOPHEN AND CAFFEINE 50; 325; 40 MG/1; MG/1; MG/1
1 TABLET ORAL EVERY 6 HOURS PRN
Qty: 30 TABLET | Refills: 0 | Status: SHIPPED | OUTPATIENT
Start: 2018-12-07 | End: 2019-01-29 | Stop reason: SDUPTHER

## 2019-01-06 ENCOUNTER — PATIENT MESSAGE (OUTPATIENT)
Dept: PRIMARY CARE CLINIC | Facility: CLINIC | Age: 29
End: 2019-01-06

## 2019-01-08 ENCOUNTER — OFFICE VISIT (OUTPATIENT)
Dept: PRIMARY CARE CLINIC | Facility: CLINIC | Age: 29
End: 2019-01-08
Payer: MEDICAID

## 2019-01-08 VITALS
HEART RATE: 83 BPM | OXYGEN SATURATION: 97 % | BODY MASS INDEX: 33.75 KG/M2 | TEMPERATURE: 99 F | SYSTOLIC BLOOD PRESSURE: 117 MMHG | HEIGHT: 64 IN | DIASTOLIC BLOOD PRESSURE: 82 MMHG | WEIGHT: 197.69 LBS | RESPIRATION RATE: 18 BRPM

## 2019-01-08 DIAGNOSIS — R51.9 NONINTRACTABLE HEADACHE, UNSPECIFIED CHRONICITY PATTERN, UNSPECIFIED HEADACHE TYPE: ICD-10-CM

## 2019-01-08 DIAGNOSIS — L73.9 FOLLICULITIS: Primary | ICD-10-CM

## 2019-01-08 DIAGNOSIS — K08.89 TOOTHACHE: ICD-10-CM

## 2019-01-08 PROCEDURE — 99999 PR PBB SHADOW E&M-EST. PATIENT-LVL III: ICD-10-PCS | Mod: PBBFAC,TXP,, | Performed by: INTERNAL MEDICINE

## 2019-01-08 PROCEDURE — 99213 OFFICE O/P EST LOW 20 MIN: CPT | Mod: S$PBB,NTX,, | Performed by: INTERNAL MEDICINE

## 2019-01-08 PROCEDURE — 99999 PR PBB SHADOW E&M-EST. PATIENT-LVL III: CPT | Mod: PBBFAC,TXP,, | Performed by: INTERNAL MEDICINE

## 2019-01-08 PROCEDURE — 99213 OFFICE O/P EST LOW 20 MIN: CPT | Mod: PBBFAC,PN,TXP | Performed by: INTERNAL MEDICINE

## 2019-01-08 PROCEDURE — 99213 PR OFFICE/OUTPT VISIT, EST, LEVL III, 20-29 MIN: ICD-10-PCS | Mod: S$PBB,NTX,, | Performed by: INTERNAL MEDICINE

## 2019-01-08 RX ORDER — ACETAMINOPHEN AND CODEINE PHOSPHATE 300; 30 MG/1; MG/1
1 TABLET ORAL 2 TIMES DAILY PRN
Qty: 20 TABLET | Refills: 0 | Status: SHIPPED | OUTPATIENT
Start: 2019-01-08 | End: 2019-01-18

## 2019-01-08 RX ORDER — MUPIROCIN 20 MG/G
OINTMENT TOPICAL 2 TIMES DAILY
Qty: 22 G | Refills: 0 | Status: SHIPPED | OUTPATIENT
Start: 2019-01-08 | End: 2019-02-08

## 2019-01-08 RX ORDER — CHLORHEXIDINE GLUCONATE 40 MG/ML
SOLUTION TOPICAL DAILY PRN
Qty: 236 ML | Refills: 0 | Status: SHIPPED | OUTPATIENT
Start: 2019-01-08 | End: 2019-03-29

## 2019-01-08 RX ORDER — AMOXICILLIN AND CLAVULANATE POTASSIUM 875; 125 MG/1; MG/1
1 TABLET, FILM COATED ORAL EVERY 12 HOURS
Qty: 20 TABLET | Refills: 0 | Status: SHIPPED | OUTPATIENT
Start: 2019-01-08 | End: 2019-02-08

## 2019-01-09 NOTE — PROGRESS NOTES
Subjective:       Patient ID: Rosa Charles is a 28 y.o. female.    Chief Complaint: Dental Pain and Boils Over Body    HPI  C/o toothache right lower jaw and red bump on the skin in the axillary left breast and legs slightly tender no exudate also in the left buttock still had chronic intermittent headache took Fioricet p.r.n. patient had bilateral tubal ligation not pregnant and in the process of donating 1 of her kidney to her friend who she was a match  Review of Systems    Objective:      Physical Exam   Constitutional: She is oriented to person, place, and time. She appears well-developed and well-nourished. No distress.   HENT:   Head: Normocephalic and atraumatic.   Right Ear: External ear normal.   Left Ear: External ear normal.   Nose: Nose normal.   Mouth/Throat: Oropharynx is clear and moist. No oropharyngeal exudate.   Eyes: Conjunctivae and EOM are normal. Pupils are equal, round, and reactive to light. Right eye exhibits no discharge. Left eye exhibits no discharge.   Neck: Normal range of motion. Neck supple. No thyromegaly present.   Cardiovascular: Normal rate, regular rhythm, normal heart sounds and intact distal pulses. Exam reveals no gallop and no friction rub.   No murmur heard.  Pulmonary/Chest: Effort normal and breath sounds normal. No respiratory distress. She has no wheezes. She has no rales. She exhibits no tenderness.   Abdominal: Soft. Bowel sounds are normal. She exhibits no distension. There is no tenderness. There is no rebound and no guarding.   Musculoskeletal: Normal range of motion. She exhibits no edema, tenderness or deformity.   Lymphadenopathy:     She has no cervical adenopathy.   Neurological: She is alert and oriented to person, place, and time.   Skin: Skin is warm and dry. Capillary refill takes less than 2 seconds. No rash noted. No erythema.   A fuse small slightly tender erythematous papule in the axilla left breast and lower leg   Psychiatric: She has a  normal mood and affect. Judgment and thought content normal.   Nursing note and vitals reviewed.      Assessment:       1. Folliculitis    2. Toothache    3. Nonintractable headache, unspecified chronicity pattern, unspecified headache type        Plan:       Folliculitis  -     amoxicillin-clavulanate 875-125mg (AUGMENTIN) 875-125 mg per tablet; Take 1 tablet by mouth every 12 (twelve) hours.  Dispense: 20 tablet; Refill: 0  -     chlorhexidine (HIBICLENS) 4 % external liquid; Apply topically daily as needed.  Dispense: 236 mL; Refill: 0  -     mupirocin (BACTROBAN) 2 % ointment; Apply topically 2 (two) times daily.  Dispense: 22 g; Refill: 0    Toothache  -     amoxicillin-clavulanate 875-125mg (AUGMENTIN) 875-125 mg per tablet; Take 1 tablet by mouth every 12 (twelve) hours.  Dispense: 20 tablet; Refill: 0  -     acetaminophen-codeine 300-30mg (TYLENOL-CODEINE #3) 300-30 mg Tab; Take 1 tablet by mouth 2 (two) times daily as needed.  Dispense: 20 tablet; Refill: 0    Nonintractable headache, unspecified chronicity pattern, unspecified headache type  -     acetaminophen-codeine 300-30mg (TYLENOL-CODEINE #3) 300-30 mg Tab; Take 1 tablet by mouth 2 (two) times daily as needed.  Dispense: 20 tablet; Refill: 0

## 2019-01-10 ENCOUNTER — TELEPHONE (OUTPATIENT)
Dept: PRIMARY CARE CLINIC | Facility: CLINIC | Age: 29
End: 2019-01-10

## 2019-01-10 NOTE — TELEPHONE ENCOUNTER
----- Message from Marisol Enriquez sent at 1/10/2019  9:09 AM CST -----  Contact: self  Type:  Patient Returning Call    Who Called:  self  Who Left Message for Patient:  Eileen  Does the patient know what this is regarding?:  yes  Best Call Back Number:  [phone]  Additional Information:  Patient needs results of blood work. Please call patient. Thanks!

## 2019-01-29 ENCOUNTER — OFFICE VISIT (OUTPATIENT)
Dept: PRIMARY CARE CLINIC | Facility: CLINIC | Age: 29
End: 2019-01-29
Payer: MEDICAID

## 2019-01-29 ENCOUNTER — TELEPHONE (OUTPATIENT)
Dept: PRIMARY CARE CLINIC | Facility: CLINIC | Age: 29
End: 2019-01-29

## 2019-01-29 VITALS
TEMPERATURE: 99 F | HEIGHT: 64 IN | RESPIRATION RATE: 18 BRPM | SYSTOLIC BLOOD PRESSURE: 117 MMHG | BODY MASS INDEX: 33.97 KG/M2 | WEIGHT: 199 LBS | HEART RATE: 84 BPM | DIASTOLIC BLOOD PRESSURE: 81 MMHG | OXYGEN SATURATION: 98 %

## 2019-01-29 DIAGNOSIS — H10.31 ACUTE CONJUNCTIVITIS OF RIGHT EYE, UNSPECIFIED ACUTE CONJUNCTIVITIS TYPE: ICD-10-CM

## 2019-01-29 DIAGNOSIS — R51.9 NONINTRACTABLE HEADACHE, UNSPECIFIED CHRONICITY PATTERN, UNSPECIFIED HEADACHE TYPE: Primary | ICD-10-CM

## 2019-01-29 PROCEDURE — 99999 PR PBB SHADOW E&M-EST. PATIENT-LVL III: ICD-10-PCS | Mod: PBBFAC,TXP,, | Performed by: INTERNAL MEDICINE

## 2019-01-29 PROCEDURE — 99213 OFFICE O/P EST LOW 20 MIN: CPT | Mod: PBBFAC,PN,TXP | Performed by: INTERNAL MEDICINE

## 2019-01-29 PROCEDURE — 99213 OFFICE O/P EST LOW 20 MIN: CPT | Mod: S$PBB,TXP,, | Performed by: INTERNAL MEDICINE

## 2019-01-29 PROCEDURE — 99213 PR OFFICE/OUTPT VISIT, EST, LEVL III, 20-29 MIN: ICD-10-PCS | Mod: S$PBB,TXP,, | Performed by: INTERNAL MEDICINE

## 2019-01-29 PROCEDURE — 99999 PR PBB SHADOW E&M-EST. PATIENT-LVL III: CPT | Mod: PBBFAC,TXP,, | Performed by: INTERNAL MEDICINE

## 2019-01-29 RX ORDER — BUTALBITAL, ACETAMINOPHEN AND CAFFEINE 50; 325; 40 MG/1; MG/1; MG/1
1 TABLET ORAL EVERY 6 HOURS PRN
Qty: 30 TABLET | Refills: 1 | Status: SHIPPED | OUTPATIENT
Start: 2019-01-29 | End: 2019-03-29

## 2019-01-29 RX ORDER — TOBRAMYCIN AND DEXAMETHASONE 3; 1 MG/ML; MG/ML
1 SUSPENSION/ DROPS OPHTHALMIC 3 TIMES DAILY
Qty: 2.5 ML | Refills: 0 | Status: SHIPPED | OUTPATIENT
Start: 2019-01-29 | End: 2019-01-31 | Stop reason: SDUPTHER

## 2019-01-29 NOTE — TELEPHONE ENCOUNTER
----- Message from Ashley Dooley sent at 1/29/2019 10:10 AM CST -----  Contact: Rosa  Type:  Same Day Appointment Request    Caller is requesting a same day appointment.  Caller declined first available appointment listed below.      Name of Caller:  patient  When is the first available appointment?  1/30/19  Symptoms:  See below  Best Call Back Number:  [phone]  Additional Information:   Patient would like to be seen today--patient needs a refill on headache medication & she has a possible corneal abrasion--hurts to blink & thinks contact scratched her eye--please advise--thank you

## 2019-01-29 NOTE — PROGRESS NOTES
Subjective:       Patient ID: Rosa Charles is a 28 y.o. female.    Chief Complaint: Eye Pain    HPI  patient complained of the right eye hurt redness and burning couple days sitting at from her contact lens CC remove it but still hurt also gaining weight not active due to winter time weather change not able to go to the gym no short of breath chest pain currently not pregnant  Review of Systems    Objective:      Physical Exam   Constitutional: She is oriented to person, place, and time. She appears well-developed and well-nourished. No distress.   Overweight   HENT:   Head: Normocephalic and atraumatic.   Right Ear: External ear normal.   Left Ear: External ear normal.   Nose: Nose normal.   Mouth/Throat: Oropharynx is clear and moist. No oropharyngeal exudate.   Eyes: EOM are normal. Pupils are equal, round, and reactive to light. Right eye exhibits no discharge. Left eye exhibits no discharge.   Right eye with erythematous conjunctiva the cornea appear to be okay no abrasion appreciated   Neck: Normal range of motion. Neck supple. No thyromegaly present.   Cardiovascular: Normal rate, regular rhythm, normal heart sounds and intact distal pulses. Exam reveals no gallop and no friction rub.   No murmur heard.  Pulmonary/Chest: Effort normal and breath sounds normal. No respiratory distress. She has no wheezes. She has no rales. She exhibits no tenderness.   Abdominal: Soft. Bowel sounds are normal. She exhibits no distension. There is no tenderness. There is no rebound and no guarding.   Musculoskeletal: Normal range of motion. She exhibits no edema, tenderness or deformity.   Lymphadenopathy:     She has no cervical adenopathy.   Neurological: She is alert and oriented to person, place, and time.   Skin: Skin is warm and dry. Capillary refill takes less than 2 seconds. No rash noted. No erythema.   Psychiatric: She has a normal mood and affect. Judgment and thought content normal.   Nursing note and  vitals reviewed.      Assessment:       1. Nonintractable headache, unspecified chronicity pattern, unspecified headache type    2. Acute conjunctivitis of right eye, unspecified acute conjunctivitis type        Plan:       Nonintractable headache, unspecified chronicity pattern, unspecified headache type  -     butalbital-acetaminophen-caffeine -40 mg (FIORICET, ESGIC) -40 mg per tablet; Take 1 tablet by mouth every 6 (six) hours as needed for Pain.  Dispense: 30 tablet; Refill: 1    Acute conjunctivitis of right eye, unspecified acute conjunctivitis type  -     tobramycin-dexamethasone 0.3-0.1% (TOBRADEX) 0.3-0.1 % DrpS; Place 1 drop into the right eye 3 (three) times daily.  Dispense: 2.5 mL; Refill: 0

## 2019-01-31 ENCOUNTER — TELEPHONE (OUTPATIENT)
Dept: PRIMARY CARE CLINIC | Facility: CLINIC | Age: 29
End: 2019-01-31

## 2019-01-31 DIAGNOSIS — H10.31 ACUTE CONJUNCTIVITIS OF RIGHT EYE, UNSPECIFIED ACUTE CONJUNCTIVITIS TYPE: Primary | ICD-10-CM

## 2019-01-31 RX ORDER — MOXIFLOXACIN 5 MG/ML
1 SOLUTION/ DROPS OPHTHALMIC 2 TIMES DAILY
Qty: 3 ML | Refills: 0 | Status: SHIPPED | OUTPATIENT
Start: 2019-01-31 | End: 2019-03-29

## 2019-01-31 NOTE — TELEPHONE ENCOUNTER
----- Message from Enrique Manriquez sent at 1/31/2019  8:39 AM CST -----  Type: Needs Medical Advice    Who Called:  Patient  Best Call Back Number: [phone] (home)   Additional Information: Patient stated her other eye is now bothering her. Please call to advise.

## 2019-01-31 NOTE — TELEPHONE ENCOUNTER
Pt thinks she now has pink eye as both eyes are red and irritated, works in a day care, was here earlier in week for a scratch on one eye, needs eye drops to clear up eyes

## 2019-02-07 ENCOUNTER — PATIENT MESSAGE (OUTPATIENT)
Dept: PRIMARY CARE CLINIC | Facility: CLINIC | Age: 29
End: 2019-02-07

## 2019-02-08 ENCOUNTER — OFFICE VISIT (OUTPATIENT)
Dept: PRIMARY CARE CLINIC | Facility: CLINIC | Age: 29
End: 2019-02-08
Payer: MEDICAID

## 2019-02-08 VITALS
RESPIRATION RATE: 18 BRPM | WEIGHT: 199.81 LBS | HEIGHT: 64 IN | TEMPERATURE: 99 F | HEART RATE: 80 BPM | BODY MASS INDEX: 34.11 KG/M2 | DIASTOLIC BLOOD PRESSURE: 77 MMHG | OXYGEN SATURATION: 97 % | SYSTOLIC BLOOD PRESSURE: 116 MMHG

## 2019-02-08 DIAGNOSIS — K52.9 GASTROENTERITIS: Primary | ICD-10-CM

## 2019-02-08 DIAGNOSIS — J02.9 PHARYNGITIS, UNSPECIFIED ETIOLOGY: ICD-10-CM

## 2019-02-08 DIAGNOSIS — R10.9 ABDOMINAL CRAMPING: ICD-10-CM

## 2019-02-08 DIAGNOSIS — R11.0 NAUSEA: ICD-10-CM

## 2019-02-08 PROCEDURE — 99213 PR OFFICE/OUTPT VISIT, EST, LEVL III, 20-29 MIN: ICD-10-PCS | Mod: S$PBB,,, | Performed by: INTERNAL MEDICINE

## 2019-02-08 PROCEDURE — 99999 PR PBB SHADOW E&M-EST. PATIENT-LVL III: ICD-10-PCS | Mod: PBBFAC,,, | Performed by: INTERNAL MEDICINE

## 2019-02-08 PROCEDURE — 99999 PR PBB SHADOW E&M-EST. PATIENT-LVL III: CPT | Mod: PBBFAC,,, | Performed by: INTERNAL MEDICINE

## 2019-02-08 PROCEDURE — 99213 OFFICE O/P EST LOW 20 MIN: CPT | Mod: PBBFAC,PN | Performed by: INTERNAL MEDICINE

## 2019-02-08 PROCEDURE — 99213 OFFICE O/P EST LOW 20 MIN: CPT | Mod: S$PBB,,, | Performed by: INTERNAL MEDICINE

## 2019-02-08 RX ORDER — ONDANSETRON HYDROCHLORIDE 8 MG/1
8 TABLET, FILM COATED ORAL EVERY 8 HOURS PRN
Qty: 10 TABLET | Refills: 0 | Status: SHIPPED | OUTPATIENT
Start: 2019-02-08 | End: 2019-03-29

## 2019-02-08 RX ORDER — CIPROFLOXACIN 500 MG/1
500 TABLET ORAL 2 TIMES DAILY
Qty: 14 TABLET | Refills: 0 | Status: SHIPPED | OUTPATIENT
Start: 2019-02-08 | End: 2019-02-15

## 2019-02-08 RX ORDER — OMEPRAZOLE 20 MG/1
20 CAPSULE, DELAYED RELEASE ORAL DAILY
Qty: 30 CAPSULE | Refills: 0 | Status: SHIPPED | OUTPATIENT
Start: 2019-02-08 | End: 2019-03-29

## 2019-02-08 RX ORDER — DIPHENOXYLATE HYDROCHLORIDE AND ATROPINE SULFATE 2.5; .025 MG/1; MG/1
1 TABLET ORAL 4 TIMES DAILY PRN
Qty: 30 TABLET | Refills: 0 | Status: SHIPPED | OUTPATIENT
Start: 2019-02-08 | End: 2019-02-18

## 2019-02-08 NOTE — PROGRESS NOTES
Subjective:       Patient ID: Rosa Charles is a 28 y.o. female.    Chief Complaint: Diarrhea and Burping Sulfa Taste    HPI  patient complained of diarrhea seen yesterday has the mucus in it but no blood and lower abdominal cramps and sore throat and a lot of burping that has a sulfa taste a no high fever no vomiting no short of breath or chest pain no one else at home sick did not eat any raw seafood or uncooked food or left over food  Review of Systems    Objective:      Physical Exam   Constitutional: She is oriented to person, place, and time. She appears well-developed and well-nourished. No distress.   HENT:   Head: Normocephalic and atraumatic.   Right Ear: External ear normal.   Left Ear: External ear normal.   Nose: Nose normal.   Mouth/Throat: Oropharynx is clear and moist. No oropharyngeal exudate.   Eyes: Conjunctivae and EOM are normal. Pupils are equal, round, and reactive to light. Right eye exhibits no discharge. Left eye exhibits no discharge.   Neck: Normal range of motion. Neck supple. No thyromegaly present.   Cardiovascular: Normal rate, regular rhythm, normal heart sounds and intact distal pulses. Exam reveals no gallop and no friction rub.   No murmur heard.  Pulmonary/Chest: Effort normal and breath sounds normal. No respiratory distress. She has no wheezes. She has no rales. She exhibits no tenderness.   Abdominal: Soft. Bowel sounds are normal. She exhibits no distension. There is tenderness (Mild diffuse tenderness with palpation but no guarding no rebound). There is no rebound and no guarding.   Musculoskeletal: Normal range of motion. She exhibits no edema, tenderness or deformity.   Lymphadenopathy:     She has no cervical adenopathy.   Neurological: She is alert and oriented to person, place, and time.   Skin: Skin is warm and dry. Capillary refill takes less than 2 seconds. No rash noted. No erythema.   Psychiatric: She has a normal mood and affect. Judgment and thought  content normal.   Nursing note and vitals reviewed.      Assessment:       1. Gastroenteritis    2. Pharyngitis, unspecified etiology    3. Abdominal cramping    4. Nausea        Plan:       Gastroenteritis  -     ciprofloxacin HCl (CIPRO) 500 MG tablet; Take 1 tablet (500 mg total) by mouth 2 (two) times daily. for 7 days  Dispense: 14 tablet; Refill: 0  -     diphenoxylate-atropine 2.5-0.025 mg (LOMOTIL) 2.5-0.025 mg per tablet; Take 1 tablet by mouth 4 (four) times daily as needed for Diarrhea.  Dispense: 30 tablet; Refill: 0    Pharyngitis, unspecified etiology  -     ciprofloxacin HCl (CIPRO) 500 MG tablet; Take 1 tablet (500 mg total) by mouth 2 (two) times daily. for 7 days  Dispense: 14 tablet; Refill: 0    Abdominal cramping  -     diphenoxylate-atropine 2.5-0.025 mg (LOMOTIL) 2.5-0.025 mg per tablet; Take 1 tablet by mouth 4 (four) times daily as needed for Diarrhea.  Dispense: 30 tablet; Refill: 0  -     omeprazole (PRILOSEC) 20 MG capsule; Take 1 capsule (20 mg total) by mouth once daily.  Dispense: 30 capsule; Refill: 0    Nausea  -     ondansetron (ZOFRAN) 8 MG tablet; Take 1 tablet (8 mg total) by mouth every 8 (eight) hours as needed for Nausea.  Dispense: 10 tablet; Refill: 0

## 2019-02-18 ENCOUNTER — PATIENT MESSAGE (OUTPATIENT)
Dept: OBSTETRICS AND GYNECOLOGY | Facility: CLINIC | Age: 29
End: 2019-02-18

## 2019-02-18 ENCOUNTER — TELEPHONE (OUTPATIENT)
Dept: OBSTETRICS AND GYNECOLOGY | Facility: CLINIC | Age: 29
End: 2019-02-18

## 2019-02-18 NOTE — TELEPHONE ENCOUNTER
----- Message from CHRIS Jenkins MD sent at 2/18/2019 10:37 AM CST -----  Regarding: FW: Pap  Please schedule patient for repeat pap.  Thanks.    ----- Message -----  From: CHRIS Jenkins MD  Sent: 1/3/2018   2:21 PM  To: CHRIS Jenkins MD  Subject: Pap                                              Repeat co-testing.

## 2019-02-18 NOTE — TELEPHONE ENCOUNTER
Patient wanted to be scheduled for repeat pap at [facility] with another doctor. There was not availability in the next 2 weeks on Tuesday afternoons. Patient states she will call back after speaking with her job.

## 2019-03-06 ENCOUNTER — TELEPHONE (OUTPATIENT)
Dept: PRIMARY CARE CLINIC | Facility: CLINIC | Age: 29
End: 2019-03-06

## 2019-03-06 NOTE — TELEPHONE ENCOUNTER
Spoke with patient and notified no availabilities until 3/21/19.  Notified her I put her on the wait list just in case someone cancels

## 2019-03-06 NOTE — TELEPHONE ENCOUNTER
----- Message from Tita Ryan sent at 3/6/2019  9:26 AM CST -----  Type:  Same Day Appointment Request    Caller is requesting a same day appointment.  Caller declined first available appointment listed below.      Name of Caller: Patient  When is the first available appointment?  3/21/19  Symptoms:  Abdominal pains  Best Call Back Number:  [phone] (home)     Additional Information:   Na

## 2019-03-18 ENCOUNTER — PATIENT MESSAGE (OUTPATIENT)
Dept: PRIMARY CARE CLINIC | Facility: CLINIC | Age: 29
End: 2019-03-18

## 2019-03-18 ENCOUNTER — TELEPHONE (OUTPATIENT)
Dept: PRIMARY CARE CLINIC | Facility: CLINIC | Age: 29
End: 2019-03-18

## 2019-03-18 NOTE — TELEPHONE ENCOUNTER
----- Message from Johana Herrera sent at 3/18/2019  8:26 AM CDT -----  Contact: Patient  Type:  Sooner Apoointment Request    Caller is requesting a sooner appointment.  Caller declined first available appointment listed below.  Caller will not accept being placed on the waitlist and is requesting a message be sent to doctor.    Name of Caller:  alex Lee  When is the first available appointment?  04/01/2193  Symptoms:  Orange chucks in her urine  Best Call Back Number:  [phone]  Additional Information:  Please call her. Thanks.

## 2019-03-18 NOTE — TELEPHONE ENCOUNTER
Spoke with patient VIA portal and notified her that she can come stop in to give us a urine sample since we have no open appointments today

## 2019-03-20 ENCOUNTER — TELEPHONE (OUTPATIENT)
Dept: OBSTETRICS AND GYNECOLOGY | Facility: CLINIC | Age: 29
End: 2019-03-20

## 2019-03-20 RX ORDER — NITROFURANTOIN (MACROCRYSTALS) 100 MG/1
100 CAPSULE ORAL 2 TIMES DAILY
Qty: 14 CAPSULE | Refills: 0 | Status: SHIPPED | OUTPATIENT
Start: 2019-03-20 | End: 2019-03-29

## 2019-03-20 NOTE — TELEPHONE ENCOUNTER
PLEASE SIGN PENDED RX PER RESULT NOTE BELOW.    ----- Message from Selvin Morocho MD sent at 3/20/2019  5:56 AM CDT -----  Please call the patient regarding her UA still have a UTI can try Macrobid 100 mg p.o. twice a day 14.  And repeat UA in 1 week and send urine for culture

## 2019-03-20 NOTE — TELEPHONE ENCOUNTER
Spoke with pt. Pt asked to call her back in the morning she is currently at work and cannot talk.

## 2019-03-20 NOTE — TELEPHONE ENCOUNTER
----- Message from CHRIS Jenkins MD sent at 3/20/2019  3:05 PM CDT -----  Regarding: FW: Pap  Please schedule patient for repeat pap.  Thanks.    ----- Message -----  From: CHRIS Jenkins MD  Sent: 2/18/2019  10:36 AM  To: CHRIS Jenkins MD  Subject: Pap                                              2nd reminder for repeat pap

## 2019-03-22 ENCOUNTER — TELEPHONE (OUTPATIENT)
Dept: OBSTETRICS AND GYNECOLOGY | Facility: CLINIC | Age: 29
End: 2019-03-22

## 2019-03-22 ENCOUNTER — PATIENT MESSAGE (OUTPATIENT)
Dept: OBSTETRICS AND GYNECOLOGY | Facility: CLINIC | Age: 29
End: 2019-03-22

## 2019-04-01 ENCOUNTER — PATIENT MESSAGE (OUTPATIENT)
Dept: OBSTETRICS AND GYNECOLOGY | Facility: CLINIC | Age: 29
End: 2019-04-01

## 2019-04-02 ENCOUNTER — OFFICE VISIT (OUTPATIENT)
Dept: OBSTETRICS AND GYNECOLOGY | Facility: CLINIC | Age: 29
End: 2019-04-02
Payer: MEDICAID

## 2019-04-02 VITALS
HEIGHT: 64 IN | BODY MASS INDEX: 34.89 KG/M2 | WEIGHT: 204.38 LBS | DIASTOLIC BLOOD PRESSURE: 92 MMHG | SYSTOLIC BLOOD PRESSURE: 134 MMHG

## 2019-04-02 DIAGNOSIS — Z01.419 WELL WOMAN EXAM WITH ROUTINE GYNECOLOGICAL EXAM: Primary | ICD-10-CM

## 2019-04-02 DIAGNOSIS — N89.8 VAGINAL IRRITATION: ICD-10-CM

## 2019-04-02 PROCEDURE — 99395 PR PREVENTIVE VISIT,EST,18-39: ICD-10-PCS | Mod: S$PBB,,, | Performed by: OBSTETRICS & GYNECOLOGY

## 2019-04-02 PROCEDURE — 87510 GARDNER VAG DNA DIR PROBE: CPT

## 2019-04-02 PROCEDURE — 87624 HPV HI-RISK TYP POOLED RSLT: CPT

## 2019-04-02 PROCEDURE — 88142 CYTOPATH C/V THIN LAYER: CPT

## 2019-04-02 PROCEDURE — 99999 PR PBB SHADOW E&M-EST. PATIENT-LVL III: CPT | Mod: PBBFAC,,, | Performed by: OBSTETRICS & GYNECOLOGY

## 2019-04-02 PROCEDURE — 99213 OFFICE O/P EST LOW 20 MIN: CPT | Mod: PBBFAC,PN | Performed by: OBSTETRICS & GYNECOLOGY

## 2019-04-02 PROCEDURE — 87480 CANDIDA DNA DIR PROBE: CPT

## 2019-04-02 PROCEDURE — 99999 PR PBB SHADOW E&M-EST. PATIENT-LVL III: ICD-10-PCS | Mod: PBBFAC,,, | Performed by: OBSTETRICS & GYNECOLOGY

## 2019-04-02 PROCEDURE — 99395 PREV VISIT EST AGE 18-39: CPT | Mod: S$PBB,,, | Performed by: OBSTETRICS & GYNECOLOGY

## 2019-04-02 RX ORDER — SULFAMETHOXAZOLE AND TRIMETHOPRIM 800; 160 MG/1; MG/1
1 TABLET ORAL DAILY
Qty: 15 TABLET | Refills: 3 | Status: SHIPPED | OUTPATIENT
Start: 2019-04-02 | End: 2019-04-27
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
## 2019-04-03 ENCOUNTER — TELEPHONE (OUTPATIENT)
Dept: OBSTETRICS AND GYNECOLOGY | Facility: CLINIC | Age: 29
End: 2019-04-03

## 2019-04-03 LAB
BACTERIAL VAGINOSIS DNA: POSITIVE
CANDIDA GLABRATA DNA: NEGATIVE
CANDIDA KRUSEI DNA: NEGATIVE
CANDIDA RRNA VAG QL PROBE: POSITIVE
T VAGINALIS RRNA GENITAL QL PROBE: NEGATIVE

## 2019-04-03 RX ORDER — TERCONAZOLE 4 MG/G
1 CREAM VAGINAL NIGHTLY
Qty: 45 G | Refills: 0 | Status: SHIPPED | OUTPATIENT
Start: 2019-04-03 | End: 2019-04-10

## 2019-04-03 RX ORDER — METRONIDAZOLE 500 MG/1
500 TABLET ORAL EVERY 12 HOURS
Qty: 14 TABLET | Refills: 0 | Status: SHIPPED | OUTPATIENT
Start: 2019-04-03 | End: 2019-04-10

## 2019-04-03 NOTE — TELEPHONE ENCOUNTER
----- Message from Khai Browning MD sent at 4/3/2019 12:52 PM CDT -----  I sent this patient a message on [facility] re BV and yeast. I told her I sent diflucan but ended up sending terazol instead because diflucan interacts with one of her other meds. I just wanted you to let her know since the message states something different

## 2019-04-04 ENCOUNTER — PATIENT MESSAGE (OUTPATIENT)
Dept: PRIMARY CARE CLINIC | Facility: CLINIC | Age: 29
End: 2019-04-04

## 2019-04-05 ENCOUNTER — PATIENT MESSAGE (OUTPATIENT)
Dept: PRIMARY CARE CLINIC | Facility: CLINIC | Age: 29
End: 2019-04-05

## 2019-04-05 LAB
HPV HR 12 DNA CVX QL NAA+PROBE: NEGATIVE
HPV16 AG SPEC QL: NEGATIVE
HPV18 DNA SPEC QL NAA+PROBE: NEGATIVE

## 2019-04-09 ENCOUNTER — PATIENT MESSAGE (OUTPATIENT)
Dept: OBSTETRICS AND GYNECOLOGY | Facility: CLINIC | Age: 29
End: 2019-04-09

## 2019-04-15 ENCOUNTER — PATIENT MESSAGE (OUTPATIENT)
Dept: OBSTETRICS AND GYNECOLOGY | Facility: CLINIC | Age: 29
End: 2019-04-15

## 2019-04-15 DIAGNOSIS — M54.30 SCIATICA, UNSPECIFIED LATERALITY: ICD-10-CM

## 2019-04-15 DIAGNOSIS — S33.9XXA LUMBOSACRAL LIGAMENT SPRAIN, INITIAL ENCOUNTER: ICD-10-CM

## 2019-04-15 RX ORDER — IBUPROFEN 800 MG/1
800 TABLET ORAL 3 TIMES DAILY
Qty: 30 TABLET | Refills: 0 | Status: SHIPPED | OUTPATIENT
Start: 2019-04-15 | End: 2019-04-27

## 2019-04-22 ENCOUNTER — PATIENT MESSAGE (OUTPATIENT)
Dept: OBSTETRICS AND GYNECOLOGY | Facility: CLINIC | Age: 29
End: 2019-04-22

## 2019-04-22 RX ORDER — FLUCONAZOLE 150 MG/1
150 TABLET ORAL DAILY
Qty: 1 TABLET | Refills: 0 | Status: SHIPPED | OUTPATIENT
Start: 2019-04-22 | End: 2019-04-23

## 2019-04-27 PROBLEM — F41.9 ANXIETY: Status: ACTIVE | Noted: 2017-11-08

## 2019-04-27 PROBLEM — R45.851 DEPRESSION WITH SUICIDAL IDEATION: Status: ACTIVE | Noted: 2019-04-27

## 2019-04-27 PROBLEM — F32.A DEPRESSION WITH SUICIDAL IDEATION: Status: ACTIVE | Noted: 2019-04-27

## 2019-04-28 PROBLEM — F12.10 CANNABIS ABUSE: Status: ACTIVE | Noted: 2019-04-28

## 2019-06-04 ENCOUNTER — PATIENT MESSAGE (OUTPATIENT)
Dept: PRIMARY CARE CLINIC | Facility: CLINIC | Age: 29
End: 2019-06-04

## 2019-06-05 NOTE — TELEPHONE ENCOUNTER
Pt need f/u with her psychiatrist she was admitted to psych hospital couple weeks ago and she can come in to have EKG done

## 2019-06-12 ENCOUNTER — PATIENT MESSAGE (OUTPATIENT)
Dept: PRIMARY CARE CLINIC | Facility: CLINIC | Age: 29
End: 2019-06-12

## 2019-06-12 ENCOUNTER — OFFICE VISIT (OUTPATIENT)
Dept: PRIMARY CARE CLINIC | Facility: CLINIC | Age: 29
End: 2019-06-12
Payer: MEDICAID

## 2019-06-12 VITALS
OXYGEN SATURATION: 98 % | TEMPERATURE: 99 F | BODY MASS INDEX: 35.22 KG/M2 | RESPIRATION RATE: 18 BRPM | DIASTOLIC BLOOD PRESSURE: 78 MMHG | WEIGHT: 206.31 LBS | HEIGHT: 64 IN | HEART RATE: 91 BPM | SYSTOLIC BLOOD PRESSURE: 122 MMHG

## 2019-06-12 DIAGNOSIS — J01.90 ACUTE NON-RECURRENT SINUSITIS, UNSPECIFIED LOCATION: Primary | ICD-10-CM

## 2019-06-12 DIAGNOSIS — R07.81 PLEURITIC CHEST PAIN: ICD-10-CM

## 2019-06-12 DIAGNOSIS — F41.9 ANXIETY: ICD-10-CM

## 2019-06-12 DIAGNOSIS — F32.A DEPRESSION, UNSPECIFIED DEPRESSION TYPE: ICD-10-CM

## 2019-06-12 PROCEDURE — 99213 OFFICE O/P EST LOW 20 MIN: CPT | Mod: S$PBB,,, | Performed by: INTERNAL MEDICINE

## 2019-06-12 PROCEDURE — 99213 OFFICE O/P EST LOW 20 MIN: CPT | Mod: PBBFAC,PN | Performed by: INTERNAL MEDICINE

## 2019-06-12 PROCEDURE — 99999 PR PBB SHADOW E&M-EST. PATIENT-LVL III: CPT | Mod: PBBFAC,,, | Performed by: INTERNAL MEDICINE

## 2019-06-12 PROCEDURE — 99999 PR PBB SHADOW E&M-EST. PATIENT-LVL III: ICD-10-PCS | Mod: PBBFAC,,, | Performed by: INTERNAL MEDICINE

## 2019-06-12 PROCEDURE — 99213 PR OFFICE/OUTPT VISIT, EST, LEVL III, 20-29 MIN: ICD-10-PCS | Mod: S$PBB,,, | Performed by: INTERNAL MEDICINE

## 2019-06-12 RX ORDER — CITALOPRAM 20 MG/1
TABLET, FILM COATED ORAL
Refills: 0 | COMMUNITY
Start: 2019-05-06 | End: 2019-07-12

## 2019-06-12 RX ORDER — METHYLPREDNISOLONE 4 MG/1
TABLET ORAL
Qty: 1 PACKAGE | Refills: 0 | Status: SHIPPED | OUTPATIENT
Start: 2019-06-12 | End: 2019-07-12

## 2019-06-12 RX ORDER — PROMETHAZINE HYDROCHLORIDE AND CODEINE PHOSPHATE 6.25; 1 MG/5ML; MG/5ML
5 SOLUTION ORAL EVERY 4 HOURS PRN
Qty: 120 ML | Refills: 0 | Status: SHIPPED | OUTPATIENT
Start: 2019-06-12 | End: 2019-06-22

## 2019-06-12 RX ORDER — AMOXICILLIN AND CLAVULANATE POTASSIUM 875; 125 MG/1; MG/1
1 TABLET, FILM COATED ORAL EVERY 12 HOURS
Qty: 20 TABLET | Refills: 0 | Status: SHIPPED | OUTPATIENT
Start: 2019-06-12 | End: 2019-07-12

## 2019-06-12 NOTE — PROGRESS NOTES
Subjective:       Patient ID: Rosa Charles is a 28 y.o. female.    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 She appears well-developed and well-nourished. No distress.   HENT:   Head: Normocephalic and atraumatic.   Right Ear: External ear normal.   Left Ear: External ear normal.   Mouth/Throat: Oropharynx is clear and moist. No oropharyngeal exudate.   Nasal congestion coughing   Eyes: Pupils are equal, round, and reactive to light. Conjunctivae and EOM are normal. Right eye exhibits no discharge. Left eye exhibits no discharge.   Neck: Normal range of motion. Neck supple. No thyromegaly present.   Cardiovascular: Normal rate, regular rhythm, normal heart sounds and intact distal pulses. Exam reveals no gallop and no friction rub.   No murmur heard.  Pulmonary/Chest: Effort normal and breath sounds normal. No respiratory distress. She has no wheezes. She has no rales. She exhibits no tenderness.   Abdominal: Soft. Bowel sounds are normal. She exhibits no distension. There is no tenderness. There is no rebound and no guarding.   Musculoskeletal: Normal range of motion. She exhibits no edema, tenderness or deformity.   Lymphadenopathy:     She has no cervical adenopathy.   Neurological: She is alert and oriented to person, place, and time.   Skin: Skin is warm and dry. Capillary refill takes less than 2 seconds. No rash noted. No erythema.   Psychiatric: She has a normal mood and affect. Judgment and thought content normal.   Nursing note and vitals reviewed.      Assessment:       1. Acute non-recurrent sinusitis, unspecified location    2. Pleuritic chest pain    3. Anxiety    4. Depression, unspecified depression type        Plan:       Acute  non-recurrent sinusitis, unspecified location  -     promethazine-codeine 6.25-10 mg/5 ml (PHENERGAN WITH CODEINE) 6.25-10 mg/5 mL syrup; Take 5 mLs by mouth every 4 (four) hours as needed for Cough.  Dispense: 120 mL; Refill: 0  -     methylPREDNISolone (MEDROL DOSEPACK) 4 mg tablet; use as directed  Dispense: 1 Package; Refill: 0  -     amoxicillin-clavulanate 875-125mg (AUGMENTIN) 875-125 mg per tablet; Take 1 tablet by mouth every 12 (twelve) hours.  Dispense: 20 tablet; Refill: 0    Pleuritic chest pain  -     promethazine-codeine 6.25-10 mg/5 ml (PHENERGAN WITH CODEINE) 6.25-10 mg/5 mL syrup; Take 5 mLs by mouth every 4 (four) hours as needed for Cough.  Dispense: 120 mL; Refill: 0    Anxiety    Depression, unspecified depression type  Comments:  stable f/u with psych ewelina suicidal though ideation much better since psych admit and current meds

## 2019-06-17 ENCOUNTER — PATIENT MESSAGE (OUTPATIENT)
Dept: OBSTETRICS AND GYNECOLOGY | Facility: CLINIC | Age: 29
End: 2019-06-17

## 2019-06-17 RX ORDER — FLUCONAZOLE 150 MG/1
150 TABLET ORAL DAILY
Qty: 1 TABLET | Refills: 0 | Status: SHIPPED | OUTPATIENT
Start: 2019-06-17 | End: 2019-06-18

## 2019-07-01 ENCOUNTER — PATIENT MESSAGE (OUTPATIENT)
Dept: OBSTETRICS AND GYNECOLOGY | Facility: CLINIC | Age: 29
End: 2019-07-01

## 2019-07-05 ENCOUNTER — PATIENT MESSAGE (OUTPATIENT)
Dept: PRIMARY CARE CLINIC | Facility: CLINIC | Age: 29
End: 2019-07-05

## 2019-07-05 RX ORDER — BUTALBITAL, ACETAMINOPHEN AND CAFFEINE 50; 325; 40 MG/1; MG/1; MG/1
1 TABLET ORAL DAILY PRN
Qty: 15 TABLET | Refills: 0 | Status: SHIPPED | OUTPATIENT
Start: 2019-07-05 | End: 2019-08-04

## 2019-07-12 ENCOUNTER — OFFICE VISIT (OUTPATIENT)
Dept: OBSTETRICS AND GYNECOLOGY | Facility: CLINIC | Age: 29
End: 2019-07-12
Payer: MEDICAID

## 2019-07-12 VITALS
BODY MASS INDEX: 35.21 KG/M2 | HEIGHT: 64 IN | SYSTOLIC BLOOD PRESSURE: 132 MMHG | WEIGHT: 206.25 LBS | DIASTOLIC BLOOD PRESSURE: 86 MMHG

## 2019-07-12 DIAGNOSIS — Z11.3 ROUTINE SCREENING FOR STI (SEXUALLY TRANSMITTED INFECTION): Primary | ICD-10-CM

## 2019-07-12 LAB
C TRACH DNA SPEC QL NAA+PROBE: NOT DETECTED
N GONORRHOEA DNA SPEC QL NAA+PROBE: NOT DETECTED

## 2019-07-12 PROCEDURE — 99213 OFFICE O/P EST LOW 20 MIN: CPT | Mod: S$PBB,,, | Performed by: OBSTETRICS & GYNECOLOGY

## 2019-07-12 PROCEDURE — 99213 OFFICE O/P EST LOW 20 MIN: CPT | Mod: PBBFAC,PN | Performed by: OBSTETRICS & GYNECOLOGY

## 2019-07-12 PROCEDURE — 99999 PR PBB SHADOW E&M-EST. PATIENT-LVL III: ICD-10-PCS | Mod: PBBFAC,,, | Performed by: OBSTETRICS & GYNECOLOGY

## 2019-07-12 PROCEDURE — 87491 CHLMYD TRACH DNA AMP PROBE: CPT

## 2019-07-12 PROCEDURE — 99999 PR PBB SHADOW E&M-EST. PATIENT-LVL III: CPT | Mod: PBBFAC,,, | Performed by: OBSTETRICS & GYNECOLOGY

## 2019-07-12 PROCEDURE — 99213 PR OFFICE/OUTPT VISIT, EST, LEVL III, 20-29 MIN: ICD-10-PCS | Mod: S$PBB,,, | Performed by: OBSTETRICS & GYNECOLOGY

## 2019-07-12 PROCEDURE — 87480 CANDIDA DNA DIR PROBE: CPT

## 2019-07-12 PROCEDURE — 87510 GARDNER VAG DNA DIR PROBE: CPT

## 2019-07-12 RX ORDER — OXCARBAZEPINE 300 MG/1
TABLET, FILM COATED ORAL
Refills: 1 | COMMUNITY
Start: 2019-07-11 | End: 2021-12-09 | Stop reason: CLARIF

## 2019-07-12 RX ORDER — DEXTROAMPHETAMINE SACCHARATE, AMPHETAMINE ASPARTATE, DEXTROAMPHETAMINE SULFATE AND AMPHETAMINE SULFATE 2.5; 2.5; 2.5; 2.5 MG/1; MG/1; MG/1; MG/1
TABLET ORAL
Refills: 0 | COMMUNITY
Start: 2019-07-11 | End: 2019-07-22

## 2019-07-12 RX ORDER — MIRTAZAPINE 15 MG/1
TABLET, FILM COATED ORAL
Refills: 1 | COMMUNITY
Start: 2019-07-09 | End: 2021-12-09 | Stop reason: CLARIF

## 2019-07-12 RX ORDER — CITALOPRAM 40 MG/1
40 TABLET, FILM COATED ORAL DAILY
Refills: 1 | COMMUNITY
Start: 2019-07-09 | End: 2021-12-09 | Stop reason: CLARIF

## 2019-07-12 RX ORDER — OXCARBAZEPINE 150 MG/1
TABLET, FILM COATED ORAL
Refills: 1 | COMMUNITY
Start: 2019-07-09 | End: 2021-12-09 | Stop reason: CLARIF
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
## 2019-07-13 LAB
BACTERIAL VAGINOSIS DNA: POSITIVE
CANDIDA GLABRATA DNA: NEGATIVE
CANDIDA KRUSEI DNA: NEGATIVE
CANDIDA RRNA VAG QL PROBE: NEGATIVE
T VAGINALIS RRNA GENITAL QL PROBE: POSITIVE

## 2019-07-15 ENCOUNTER — TELEPHONE (OUTPATIENT)
Dept: OBSTETRICS AND GYNECOLOGY | Facility: CLINIC | Age: 29
End: 2019-07-15

## 2019-07-15 RX ORDER — METRONIDAZOLE 500 MG/1
2000 TABLET ORAL ONCE
Qty: 4 TABLET | Refills: 0 | Status: SHIPPED | OUTPATIENT
Start: 2019-07-15 | End: 2019-07-15

## 2019-07-15 NOTE — TELEPHONE ENCOUNTER
----- Message from Khai Browning MD sent at 7/15/2019  9:12 AM CDT -----  Results reviewed. GCCT negative. +trich and BV. Rx sent to pharmacy for 2 g flagyl. [facility] message sent. Please notify patient.

## 2019-07-15 NOTE — TELEPHONE ENCOUNTER
Spoke with pt. Pt notified of results and to make a follow up appt in 6-12 weeks for SHIRLEY. Pt also notified Rx is at  to . Pt verbalized understanding.

## 2019-07-17 ENCOUNTER — OFFICE VISIT (OUTPATIENT)
Dept: PRIMARY CARE CLINIC | Facility: CLINIC | Age: 29
End: 2019-07-17
Payer: MEDICAID

## 2019-07-17 ENCOUNTER — TELEPHONE (OUTPATIENT)
Dept: PRIMARY CARE CLINIC | Facility: CLINIC | Age: 29
End: 2019-07-17

## 2019-07-17 VITALS
RESPIRATION RATE: 18 BRPM | OXYGEN SATURATION: 96 % | WEIGHT: 203.31 LBS | HEIGHT: 64 IN | DIASTOLIC BLOOD PRESSURE: 82 MMHG | BODY MASS INDEX: 34.71 KG/M2 | SYSTOLIC BLOOD PRESSURE: 122 MMHG | TEMPERATURE: 102 F | HEART RATE: 118 BPM

## 2019-07-17 DIAGNOSIS — H66.92 LEFT OTITIS MEDIA, UNSPECIFIED OTITIS MEDIA TYPE: ICD-10-CM

## 2019-07-17 DIAGNOSIS — J02.0 PHARYNGITIS DUE TO STREPTOCOCCUS SPECIES: Primary | ICD-10-CM

## 2019-07-17 DIAGNOSIS — R51.9 NONINTRACTABLE HEADACHE, UNSPECIFIED CHRONICITY PATTERN, UNSPECIFIED HEADACHE TYPE: ICD-10-CM

## 2019-07-17 PROCEDURE — 99213 PR OFFICE/OUTPT VISIT, EST, LEVL III, 20-29 MIN: ICD-10-PCS | Mod: S$PBB,,, | Performed by: INTERNAL MEDICINE

## 2019-07-17 PROCEDURE — 99999 PR PBB SHADOW E&M-EST. PATIENT-LVL IV: CPT | Mod: PBBFAC,,, | Performed by: INTERNAL MEDICINE

## 2019-07-17 PROCEDURE — 99213 OFFICE O/P EST LOW 20 MIN: CPT | Mod: S$PBB,,, | Performed by: INTERNAL MEDICINE

## 2019-07-17 PROCEDURE — 99999 PR PBB SHADOW E&M-EST. PATIENT-LVL IV: ICD-10-PCS | Mod: PBBFAC,,, | Performed by: INTERNAL MEDICINE

## 2019-07-17 PROCEDURE — 99214 OFFICE O/P EST MOD 30 MIN: CPT | Mod: PBBFAC,PN | Performed by: INTERNAL MEDICINE

## 2019-07-17 RX ORDER — IBUPROFEN 800 MG/1
800 TABLET ORAL 3 TIMES DAILY
Qty: 30 TABLET | Refills: 1 | Status: SHIPPED | OUTPATIENT
Start: 2019-07-17 | End: 2020-03-31

## 2019-07-17 RX ORDER — AMOXICILLIN AND CLAVULANATE POTASSIUM 875; 125 MG/1; MG/1
1 TABLET, FILM COATED ORAL EVERY 12 HOURS
Qty: 20 TABLET | Refills: 0 | Status: SHIPPED | OUTPATIENT
Start: 2019-07-17 | End: 2019-08-07

## 2019-07-17 RX ORDER — PREDNISONE 20 MG/1
20 TABLET ORAL 2 TIMES DAILY
Qty: 6 TABLET | Refills: 0 | Status: SHIPPED | OUTPATIENT
Start: 2019-07-17 | End: 2019-07-20

## 2019-07-17 NOTE — PROGRESS NOTES
Subjective:       Patient ID: Rosa Charles is a 28 y.o. female.    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 She appears well-developed and well-nourished. No distress.   HENT:   Head: Normocephalic and atraumatic.   Right Ear: External ear normal.   Left Ear: External ear normal.   Nose: Nose normal.   Mouth/Throat: No oropharyngeal exudate.   Diffuse patchy erythema and small exudate left tonsil and left TM erythema superior part of TM no exudate no bulging   Eyes: Pupils are equal, round, and reactive to light. Conjunctivae and EOM are normal. Right eye exhibits no discharge. Left eye exhibits no discharge.   Neck: Normal range of motion. Neck supple. No thyromegaly present.   Cardiovascular: Normal rate, regular rhythm, normal heart sounds and intact distal pulses. Exam reveals no gallop and no friction rub.   No murmur heard.  Pulmonary/Chest: Effort normal and breath sounds normal. No respiratory distress. She has no wheezes. She has no rales. She exhibits no tenderness.   Abdominal: Soft. Bowel sounds are normal. She exhibits no distension. There is no tenderness. There is no rebound and no guarding.   Musculoskeletal: Normal range of motion. She exhibits no edema, tenderness or deformity.   Lymphadenopathy:     She has no cervical adenopathy.   Neurological: She is alert and oriented to person, place, and time.   Skin: Skin is warm and dry. Capillary refill takes less than 2 seconds. No rash noted. No erythema.   Psychiatric: She has a normal mood and affect. Judgment and  thought content normal.   Nursing note and vitals reviewed.      Assessment:       1. Pharyngitis due to Streptococcus species    2. Nonintractable headache, unspecified chronicity pattern, unspecified headache type    3. Left otitis media, unspecified otitis media type        Plan:       Pharyngitis due to Streptococcus species  -     amoxicillin-clavulanate 875-125mg (AUGMENTIN) 875-125 mg per tablet; Take 1 tablet by mouth every 12 (twelve) hours.  Dispense: 20 tablet; Refill: 0  -     ibuprofen (ADVIL,MOTRIN) 800 MG tablet; Take 1 tablet (800 mg total) by mouth 3 (three) times daily.  Dispense: 30 tablet; Refill: 1  -     predniSONE (DELTASONE) 20 MG tablet; Take 1 tablet (20 mg total) by mouth 2 (two) times daily. for 3 days  Dispense: 6 tablet; Refill: 0    Nonintractable headache, unspecified chronicity pattern, unspecified headache type  -     ibuprofen (ADVIL,MOTRIN) 800 MG tablet; Take 1 tablet (800 mg total) by mouth 3 (three) times daily.  Dispense: 30 tablet; Refill: 1    Left otitis media, unspecified otitis media type  -     amoxicillin-clavulanate 875-125mg (AUGMENTIN) 875-125 mg per tablet; Take 1 tablet by mouth every 12 (twelve) hours.  Dispense: 20 tablet; Refill: 0

## 2019-07-17 NOTE — TELEPHONE ENCOUNTER
----- Message from Johana Herrera sent at 7/17/2019  9:00 AM CDT -----  Contact: Patient  Type:  Same Day Appointment Request    Caller is requesting a same day appointment.  Caller declined first available appointment listed below.      Name of Caller:  Rosa, patient  When is the first available appointment?  07/26/2019  Symptoms:  Sore throat, fever, body aches  Best Call Back Number:  [phone]  Additional Information:   Please call her. Thanks.

## 2019-07-19 RX ORDER — AZITHROMYCIN 250 MG/1
TABLET, FILM COATED ORAL
Qty: 6 TABLET | Refills: 0 | Status: SHIPPED | OUTPATIENT
Start: 2019-07-19 | End: 2019-07-24

## 2019-07-19 NOTE — TELEPHONE ENCOUNTER
Spoke with patient, instructed her to stop taking the Amoxicillin and Dr. Morocho will send in a new prescription for Azithromycin to [facility]. Patient verbalized understanding. RX pended.

## 2019-07-19 NOTE — TELEPHONE ENCOUNTER
----- Message from Johana Sharon sent at 7/19/2019  9:25 AM CDT -----  Contact: Patient  Type: Needs Medical Advice    Who Called:  Rosa, patient  Symptoms (please be specific):  Rash  How long has patient had these symptoms:  Last night  Pharmacy name and phone #:    [facility] [id number] - [place] - [address] AT [facility] [place] [address]  [address] [address]  Phone: [phone] Fax: [phone]  Best Call Back Number: [phone]  Additional Information: Broke out in a rash in her hair, face and back. Could this be from the Amoxicillin. Please advise. Thanks.

## 2019-07-22 ENCOUNTER — TELEPHONE (OUTPATIENT)
Dept: PRIMARY CARE CLINIC | Facility: CLINIC | Age: 29
End: 2019-07-22

## 2019-07-22 ENCOUNTER — PATIENT MESSAGE (OUTPATIENT)
Dept: PRIMARY CARE CLINIC | Facility: CLINIC | Age: 29
End: 2019-07-22

## 2019-07-22 NOTE — TELEPHONE ENCOUNTER
Pt can come in for f/u may need blood test check for monospot and may need ct scan if she has peritonsillar abscess

## 2019-07-25 ENCOUNTER — TELEPHONE (OUTPATIENT)
Dept: PRIMARY CARE CLINIC | Facility: CLINIC | Age: 29
End: 2019-07-25

## 2019-07-25 ENCOUNTER — PATIENT MESSAGE (OUTPATIENT)
Dept: PRIMARY CARE CLINIC | Facility: CLINIC | Age: 29
End: 2019-07-25

## 2019-07-25 DIAGNOSIS — J02.0 PHARYNGITIS DUE TO STREPTOCOCCUS SPECIES: Primary | ICD-10-CM

## 2019-08-05 ENCOUNTER — PATIENT MESSAGE (OUTPATIENT)
Dept: PRIMARY CARE CLINIC | Facility: CLINIC | Age: 29
End: 2019-08-05

## 2019-08-06 NOTE — TELEPHONE ENCOUNTER
Pt can have TMJ syndrome? Ask pt if it hurt when chewing on that side make sure keep appt with ENT tomorrow can try medrol dose pk

## 2019-08-07 ENCOUNTER — PATIENT MESSAGE (OUTPATIENT)
Dept: OBSTETRICS AND GYNECOLOGY | Facility: CLINIC | Age: 29
End: 2019-08-07

## 2019-08-07 ENCOUNTER — OFFICE VISIT (OUTPATIENT)
Dept: OTOLARYNGOLOGY | Facility: CLINIC | Age: 29
End: 2019-08-07
Payer: MEDICAID

## 2019-08-07 VITALS
BODY MASS INDEX: 35 KG/M2 | DIASTOLIC BLOOD PRESSURE: 77 MMHG | SYSTOLIC BLOOD PRESSURE: 122 MMHG | RESPIRATION RATE: 20 BRPM | HEART RATE: 92 BPM | TEMPERATURE: 98 F | WEIGHT: 203.94 LBS

## 2019-08-07 DIAGNOSIS — J03.90 TONSILLITIS: ICD-10-CM

## 2019-08-07 DIAGNOSIS — J02.9 SORE THROAT: ICD-10-CM

## 2019-08-07 PROCEDURE — 99204 PR OFFICE/OUTPT VISIT, NEW, LEVL IV, 45-59 MIN: ICD-10-PCS | Mod: 25,S$PBB,, | Performed by: NURSE PRACTITIONER

## 2019-08-07 PROCEDURE — 31575 PR LARYNGOSCOPY, FLEXIBLE; DIAGNOSTIC: ICD-10-PCS | Mod: S$PBB,,, | Performed by: NURSE PRACTITIONER

## 2019-08-07 PROCEDURE — 31575 DIAGNOSTIC LARYNGOSCOPY: CPT | Mod: S$PBB,,, | Performed by: NURSE PRACTITIONER

## 2019-08-07 PROCEDURE — 99204 OFFICE O/P NEW MOD 45 MIN: CPT | Mod: 25,S$PBB,, | Performed by: NURSE PRACTITIONER

## 2019-08-07 PROCEDURE — 99999 PR PBB SHADOW E&M-EST. PATIENT-LVL III: ICD-10-PCS | Mod: PBBFAC,,, | Performed by: NURSE PRACTITIONER

## 2019-08-07 PROCEDURE — 99999 PR PBB SHADOW E&M-EST. PATIENT-LVL III: CPT | Mod: PBBFAC,,, | Performed by: NURSE PRACTITIONER

## 2019-08-07 PROCEDURE — 99213 OFFICE O/P EST LOW 20 MIN: CPT | Mod: PBBFAC,25 | Performed by: NURSE PRACTITIONER

## 2019-08-07 PROCEDURE — 31575 DIAGNOSTIC LARYNGOSCOPY: CPT | Mod: PBBFAC | Performed by: NURSE PRACTITIONER

## 2019-08-07 RX ORDER — CLINDAMYCIN HYDROCHLORIDE 300 MG/1
300 CAPSULE ORAL EVERY 8 HOURS
Qty: 30 CAPSULE | Refills: 0 | Status: SHIPPED | OUTPATIENT
Start: 2019-08-07 | End: 2019-08-17

## 2019-08-07 RX ORDER — METRONIDAZOLE 500 MG/1
2000 TABLET ORAL ONCE
Qty: 4 TABLET | Refills: 0 | Status: SHIPPED | OUTPATIENT
Start: 2019-08-07 | End: 2019-08-07
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
## 2019-08-07 NOTE — TELEPHONE ENCOUNTER
Make sure that her partner was treated and that she abstained from intercourse or used condoms for 7 days after they were both treated.

## 2019-08-08 PROBLEM — J03.90 TONSILLITIS: Status: ACTIVE | Noted: 2019-08-08

## 2019-08-08 PROBLEM — J02.9 SORE THROAT: Status: ACTIVE | Noted: 2019-08-08

## 2019-08-08 NOTE — ASSESSMENT & PLAN NOTE
28 year old female with chronic tonsillitis. We discussed the likelihood that her jaw and ear pain are referred pain from her throat. I have prescribed clindamycin and will have her RTC after completing antibiotics to further discuss tonsillectomy. Questions answered.

## 2019-08-09 ENCOUNTER — PATIENT MESSAGE (OUTPATIENT)
Dept: OTOLARYNGOLOGY | Facility: CLINIC | Age: 29
End: 2019-08-09

## 2019-08-15 ENCOUNTER — OFFICE VISIT (OUTPATIENT)
Dept: OTOLARYNGOLOGY | Facility: CLINIC | Age: 29
End: 2019-08-15
Payer: MEDICAID

## 2019-08-15 VITALS
TEMPERATURE: 99 F | SYSTOLIC BLOOD PRESSURE: 121 MMHG | WEIGHT: 207 LBS | BODY MASS INDEX: 35.53 KG/M2 | HEART RATE: 84 BPM | DIASTOLIC BLOOD PRESSURE: 77 MMHG

## 2019-08-15 DIAGNOSIS — J03.90 TONSILLITIS: Primary | ICD-10-CM

## 2019-08-15 PROCEDURE — 99213 OFFICE O/P EST LOW 20 MIN: CPT | Mod: S$PBB,,, | Performed by: OTOLARYNGOLOGY

## 2019-08-15 PROCEDURE — 99999 PR PBB SHADOW E&M-EST. PATIENT-LVL III: CPT | Mod: PBBFAC,,, | Performed by: OTOLARYNGOLOGY

## 2019-08-15 PROCEDURE — 99213 OFFICE O/P EST LOW 20 MIN: CPT | Mod: PBBFAC | Performed by: OTOLARYNGOLOGY

## 2019-08-15 PROCEDURE — 99213 PR OFFICE/OUTPT VISIT, EST, LEVL III, 20-29 MIN: ICD-10-PCS | Mod: S$PBB,,, | Performed by: OTOLARYNGOLOGY

## 2019-08-15 PROCEDURE — 99999 PR PBB SHADOW E&M-EST. PATIENT-LVL III: ICD-10-PCS | Mod: PBBFAC,,, | Performed by: OTOLARYNGOLOGY

## 2019-08-15 NOTE — PROGRESS NOTES
Chief Complaint   Patient presents with    tonsillectomy       HPI   28 y.o. female presents for evaluation of chronic tonsillitis.  She reports that she has had 3 episodes of tonsillitis since May of this year.  She has been treated with antibiotics.  Most recently, she was treated roughly 2 weeks ago and reports that she continues to feel ill.  She reports a history of recurrent tonsillitis prior to these 3 months.  She also endorses snoring.    Review of Systems   Constitutional: Negative for fatigue and unexpected weight change.   HENT: Per HPI.  Eyes: Negative for visual disturbance.   Respiratory: Negative for shortness of breath, hemoptysis   Cardiovascular: Negative for chest pain and palpitations.   Musculoskeletal: Negative for decreased ROM, back pain.   Skin: Negative for rash, sunburn, itching.   Neurological: Negative for dizziness and seizures.   Hematological: Negative for adenopathy. Does not bruise/bleed easily.   Endocrine: Negative for rapid weight loss/weight gain, heat/cold intolerance.     Past Medical History   Patient Active Problem List   Diagnosis    Anxiety    Depression with suicidal ideation    Sore throat    Tonsillitis           Past Surgical History   Past Surgical History:   Procedure Laterality Date    ANTERIOR VAGINAL REPAIR  2010    SALPINGECTOMY Bilateral 2014    VULVA SURGERY      BREE         Family History   Family History   Problem Relation Age of Onset    Breast cancer Paternal Grandmother     Breast cancer Maternal Grandmother     Ovarian cancer Mother     Cervical cancer Mother     Lung cancer Mother     Multiple myeloma Maternal Aunt     Colon cancer Neg Hx     Cancer Neg Hx     Diabetes Neg Hx     Hypertension Neg Hx     Eclampsia Neg Hx     Miscarriages / Stillbirths Neg Hx      labor Neg Hx     Stroke Neg Hx            Social History   .  Social History     Socioeconomic History    Marital status:      Spouse name: Not on file     Number of children: Not on file    Years of education: Not on file    Highest education level: Not on file   Occupational History    Not on file   Social Needs    Financial resource strain: Not on file    Food insecurity:     Worry: Not on file     Inability: Not on file    Transportation needs:     Medical: Not on file     Non-medical: Not on file   Tobacco Use    Smoking status: Former Smoker     Types: Cigarettes    Smokeless tobacco: Former User     Quit date: 2/27/2009   Substance and Sexual Activity    Alcohol use: Yes     Comment: occas    Drug use: No    Sexual activity: Yes     Partners: Male     Birth control/protection: See Surgical Hx   Lifestyle    Physical activity:     Days per week: Not on file     Minutes per session: Not on file    Stress: Not on file   Relationships    Social connections:     Talks on phone: Not on file     Gets together: Not on file     Attends [employer] service: Not on file     Active member of club or organization: Not on file     Attends meetings of clubs or organizations: Not on file     Relationship status: Not on file   Other Topics Concern    Not on file   Social History Narrative    Not on file         Allergies   Review of patient's allergies indicates:   Allergen Reactions    Amoxicillin Hives           Physical Exam     Vitals:    08/15/19 1341   BP: 121/77   Pulse: 84   Temp: 99.2 °F (37.3 °C)         Body mass index is 35.53 kg/m².      General: AOx3, NAD   Respiratory:  Symmetric chest rise, normal effort  Nose: No gross nasal septal deviation. Inferior Turbinates WNL bilaterally. No septal perforation. No masses/lesions.   Oral Cavity:  Oral Tongue mobile, no lesions noted. Hard Palate WNL. No buccal or FOM lesions.  Oropharynx:  No masses/lesions of the posterior pharyngeal wall. Tonsillar fossa without lesions. Tonsils 2+.  Soft palate without masses. Midline uvula.   Neck: No scars.  No cervical lymphadenopathy, thyromegaly or thyroid nodules.   Normal range of motion.    Face: House Brackmann I bilaterally.     Assessment/Plan  Problem List Items Addressed This Visit        ENT    Tonsillitis - Primary     This patient has classic recurrent/chronic tonsillitis and meets numeric criteria for tonsillectomy.  We discussed that the patient has 2 fundamental options: Observation with continued and intermittent antibiotics versus tonsillectomy.  I described tonsillectomy as an outpatient procedure in which the tonsils are removed.  The patient would be treated with 2 weeks of antibiotics and pain medication.  I then discussed the risks, benefits, indications, and alternatives to tonsillectomy.  The risks were described to include, but not be limited to, infection, bleeding, scarring, failure to eliminate sore throats, and the need for additional procedures.  There is data that suggests that sore throats may occur less frequently or be less severe, although no warranties or guarantees were made or implied.  There is a risk of bleeding within the first 6 hours of surgery which returns between 5 and 14 days after surgery when the scabs resolve.  The patient was encouraged to drink lots of liquids, eat soft foods, and avoid sharp edged foods such as chips.  Time was allowed for questions, and all questions were answered to the patient's apparent satisfaction.      She would like to think about the options and will contact me for additional counseling or to schedule surgery.

## 2019-08-15 NOTE — ASSESSMENT & PLAN NOTE
This patient has classic recurrent/chronic tonsillitis and meets numeric criteria for tonsillectomy.  We discussed that the patient has 2 fundamental options: Observation with continued and intermittent antibiotics versus tonsillectomy.  I described tonsillectomy as an outpatient procedure in which the tonsils are removed.  The patient would be treated with 2 weeks of antibiotics and pain medication.  I then discussed the risks, benefits, indications, and alternatives to tonsillectomy.  The risks were described to include, but not be limited to, infection, bleeding, scarring, failure to eliminate sore throats, and the need for additional procedures.  There is data that suggests that sore throats may occur less frequently or be less severe, although no warranties or guarantees were made or implied.  There is a risk of bleeding within the first 6 hours of surgery which returns between 5 and 14 days after surgery when the scabs resolve.  The patient was encouraged to drink lots of liquids, eat soft foods, and avoid sharp edged foods such as chips.  Time was allowed for questions, and all questions were answered to the patient's apparent satisfaction.      She would like to think about the options and will contact me for additional counseling or to schedule surgery.

## 2019-08-26 ENCOUNTER — TELEPHONE (OUTPATIENT)
Dept: OTOLARYNGOLOGY | Facility: CLINIC | Age: 29
End: 2019-08-26

## 2019-08-26 NOTE — TELEPHONE ENCOUNTER
----- Message from Tonya Lang sent at 8/26/2019 12:22 PM CDT -----  Contact: pt   The caller states she needs to schedule an appt on 10.23 for a tonsillectomy.       Communication preference: [phone]

## 2019-08-27 DIAGNOSIS — J03.90 TONSILLITIS: Primary | ICD-10-CM

## 2019-08-27 RX ORDER — LIDOCAINE HYDROCHLORIDE 10 MG/ML
1 INJECTION, SOLUTION EPIDURAL; INFILTRATION; INTRACAUDAL; PERINEURAL ONCE
Status: CANCELLED | OUTPATIENT
Start: 2019-08-27 | End: 2019-08-27

## 2019-08-27 RX ORDER — SODIUM CHLORIDE 0.9 % (FLUSH) 0.9 %
10 SYRINGE (ML) INJECTION
Status: CANCELLED | OUTPATIENT
Start: 2019-08-27

## 2019-08-29 ENCOUNTER — TELEPHONE (OUTPATIENT)
Dept: PRIMARY CARE CLINIC | Facility: CLINIC | Age: 29
End: 2019-08-29

## 2019-08-29 ENCOUNTER — PATIENT MESSAGE (OUTPATIENT)
Dept: PRIMARY CARE CLINIC | Facility: CLINIC | Age: 29
End: 2019-08-29

## 2019-08-29 DIAGNOSIS — H10.9 CONJUNCTIVITIS OF BOTH EYES, UNSPECIFIED CONJUNCTIVITIS TYPE: Primary | ICD-10-CM

## 2019-08-29 RX ORDER — SULFACETAMIDE SODIUM 100 MG/ML
1 SOLUTION/ DROPS OPHTHALMIC 3 TIMES DAILY
Qty: 5 ML | Refills: 0 | Status: SHIPPED | OUTPATIENT
Start: 2019-08-29 | End: 2019-09-12 | Stop reason: CLARIF

## 2019-09-09 ENCOUNTER — ANESTHESIA EVENT (OUTPATIENT)
Dept: SURGERY | Facility: HOSPITAL | Age: 29
End: 2019-09-09
Payer: MEDICAID
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
## 2019-09-09 NOTE — PRE ADMISSION SCREENING
Anesthesia Assessment: Preoperative EQUATION    Planned Procedure: Procedure(s) (LRB):  TONSILLECTOMY (Bilateral)  Requested Anesthesia Type:General  Surgeon: Geovani Steen MD  Service: ENT  Known or anticipated Date of Surgery:10/23/2019    Surgeon notes: reviewed    Electronic QUestionnaire Assessment completed via nurse interview with patient.        NO AQ        Triage considerations:     The patient has no apparent active cardiac condition (No unstable coronary Syndrome such as severe unstable angina or recent [<1 month] myocardial infarction, decompensated CHF, severe valvular   disease or significant arrhythmia)    Previous anesthesia records:GETA, No problems and Not available    Last PCP note: within 3 months , within [facility] 7/17/19  Subspecialty notes: ENT, OB/GYN    Other important co-morbidities: PTSD, R.A.     Tests already available:  Available tests,  within 3 months , within [facility] .7/22/19-CBC            Instructions given. (See in Nurse's note)    Optimization:  Anesthesia Preop Clinic Assessment  Indicated-NOT INDICATED      Plan:    Testing:  N/A     Patient  has previously scheduled Medical Appointment:NOT AT THIS TIME    Navigation: Straight Line to surgery.               No tests, anesthesia preop clinic visit, or consult required.

## 2019-09-11 ENCOUNTER — PATIENT MESSAGE (OUTPATIENT)
Dept: PRIMARY CARE CLINIC | Facility: CLINIC | Age: 29
End: 2019-09-11

## 2019-09-11 DIAGNOSIS — F32.A DEPRESSION WITH SUICIDAL IDEATION: ICD-10-CM

## 2019-09-11 DIAGNOSIS — F43.20 ADJUSTMENT DISORDER, UNSPECIFIED TYPE: ICD-10-CM

## 2019-09-11 DIAGNOSIS — F41.9 ANXIETY: Primary | ICD-10-CM

## 2019-09-11 DIAGNOSIS — G47.00 INSOMNIA, UNSPECIFIED TYPE: ICD-10-CM

## 2019-09-11 DIAGNOSIS — F43.10 PTSD (POST-TRAUMATIC STRESS DISORDER): ICD-10-CM

## 2019-09-11 DIAGNOSIS — R45.851 DEPRESSION WITH SUICIDAL IDEATION: ICD-10-CM

## 2019-09-12 NOTE — TELEPHONE ENCOUNTER
Pt. Requesting refills on the following medications as her psychiatrist is out of town till October.

## 2019-09-13 RX ORDER — CITALOPRAM 40 MG/1
TABLET, FILM COATED ORAL
Qty: 30 TABLET | Refills: 0 | OUTPATIENT
Start: 2019-09-13

## 2019-09-13 RX ORDER — OXCARBAZEPINE 300 MG/1
TABLET, FILM COATED ORAL
Qty: 30 TABLET | Refills: 0 | OUTPATIENT
Start: 2019-09-13

## 2019-09-13 RX ORDER — OXCARBAZEPINE 150 MG/1
TABLET, FILM COATED ORAL
Qty: 30 TABLET | Refills: 0 | OUTPATIENT
Start: 2019-09-13

## 2019-09-13 RX ORDER — DEXTROAMPHETAMINE SACCHARATE, AMPHETAMINE ASPARTATE MONOHYDRATE, DEXTROAMPHETAMINE SULFATE AND AMPHETAMINE SULFATE 2.5; 2.5; 2.5; 2.5 MG/1; MG/1; MG/1; MG/1
10 CAPSULE, EXTENDED RELEASE ORAL 2 TIMES DAILY
Qty: 60 CAPSULE | Refills: 0 | OUTPATIENT
Start: 2019-09-13

## 2019-09-13 RX ORDER — MIRTAZAPINE 15 MG/1
TABLET, FILM COATED ORAL
Qty: 30 TABLET | Refills: 0 | OUTPATIENT
Start: 2019-09-13

## 2019-10-09 ENCOUNTER — PATIENT MESSAGE (OUTPATIENT)
Dept: OBSTETRICS AND GYNECOLOGY | Facility: CLINIC | Age: 29
End: 2019-10-09

## 2019-10-15 ENCOUNTER — TELEPHONE (OUTPATIENT)
Dept: OTOLARYNGOLOGY | Facility: CLINIC | Age: 29
End: 2019-10-15

## 2019-10-15 NOTE — TELEPHONE ENCOUNTER
----- Message from Geovani Steen MD sent at 10/15/2019  3:29 PM CDT -----  Contact: pt at [phone]  Yes  ----- Message -----  From: Caitlin Che RN  Sent: 10/15/2019   1:49 PM CDT  To: Geovani Steen MD    Does 12/18 work for you?    ----- Message -----  From: Marisol Gladis  Sent: 10/15/2019   1:29 PM CDT  To: Celsa Olivo Staff    Celsa pt-_t needs to cancle her surgery on October 23 and move it to December 18 if possible.  Please call pt at above number.

## 2019-10-15 NOTE — TELEPHONE ENCOUNTER
Left a vm for pt informing her 12/18 is available for new surgery date. Requested pt return call to confirm.

## 2019-10-23 ENCOUNTER — ANESTHESIA (OUTPATIENT)
Dept: SURGERY | Facility: HOSPITAL | Age: 29
End: 2019-10-23
Payer: MEDICAID

## 2019-11-05 NOTE — PRE ADMISSION SCREENING
Anesthesia Assessment: Preoperative EQUATION    Planned Procedure: Procedure(s) (LRB):  TONSILLECTOMY (Bilateral)  Requested Anesthesia Type:General  Surgeon: Geovani Steen MD  Service: ENT  Known or anticipated Date of Surgery:12/18/2019    Surgeon notes: reviewed    Electronic QUestionnaire Assessment completed via nurse interview with patient.        NO AQ        Triage considerations:     The patient has no apparent active cardiac condition (No unstable coronary Syndrome such as severe unstable angina or recent [<1 month] myocardial infarction, decompensated CHF, severe valvular   disease or significant arrhythmia)    Previous anesthesia records:GETA, No problems and Not available    Last PCP note: 3-6 months ago , within [facility] 7/17/19  Subspecialty notes: ENT, OB/GYN    Other important co-morbidities: PTSD, R.A.     Tests already available: Available tests,  within 3 months , within [facility] .7/22/19-CBC            Instructions given. (See in Nurse's note)    Optimization:  Anesthesia Preop Clinic Assessment  Indicated-NOT INDICATED      Plan:    Testing:  N/A       Patient  has previously scheduled Medical Appointment:NOT AT THIS TIME    Navigation:  Straight Line to surgery.               No tests, anesthesia preop clinic visit, or consult required.

## 2019-12-17 ENCOUNTER — TELEPHONE (OUTPATIENT)
Dept: OTOLARYNGOLOGY | Facility: CLINIC | Age: 29
End: 2019-12-17

## 2019-12-18 ENCOUNTER — HOSPITAL ENCOUNTER (OUTPATIENT)
Facility: HOSPITAL | Age: 29
Discharge: HOME | End: 2019-12-18
Attending: OTOLARYNGOLOGY | Admitting: OTOLARYNGOLOGY
Payer: MEDICAID

## 2019-12-18 VITALS
SYSTOLIC BLOOD PRESSURE: 120 MMHG | HEIGHT: 64 IN | OXYGEN SATURATION: 100 % | RESPIRATION RATE: 18 BRPM | DIASTOLIC BLOOD PRESSURE: 76 MMHG | BODY MASS INDEX: 35.85 KG/M2 | HEART RATE: 80 BPM | TEMPERATURE: 98 F | WEIGHT: 210 LBS

## 2019-12-18 DIAGNOSIS — J03.90 TONSILLITIS: Primary | ICD-10-CM

## 2019-12-18 PROCEDURE — 63600175 PHARM REV CODE 636 W HCPCS: Performed by: ANESTHESIOLOGY

## 2019-12-18 PROCEDURE — 00170 ANES INTRAORAL PX NOS: CPT | Performed by: OTOLARYNGOLOGY

## 2019-12-18 PROCEDURE — 37000008 HC ANESTHESIA 1ST 15 MINUTES: Performed by: OTOLARYNGOLOGY

## 2019-12-18 PROCEDURE — D9220A PRA ANESTHESIA: ICD-10-PCS | Mod: ,,, | Performed by: ANESTHESIOLOGY

## 2019-12-18 PROCEDURE — 42826 REMOVAL OF TONSILS: CPT | Mod: ,,, | Performed by: OTOLARYNGOLOGY

## 2019-12-18 PROCEDURE — S0077 INJECTION, CLINDAMYCIN PHOSP: HCPCS | Performed by: OTOLARYNGOLOGY

## 2019-12-18 PROCEDURE — 36000706: Performed by: OTOLARYNGOLOGY

## 2019-12-18 PROCEDURE — 37000009 HC ANESTHESIA EA ADD 15 MINS: Performed by: OTOLARYNGOLOGY

## 2019-12-18 PROCEDURE — 71000015 HC POSTOP RECOV 1ST HR: Performed by: OTOLARYNGOLOGY

## 2019-12-18 PROCEDURE — 71000033 HC RECOVERY, INTIAL HOUR: Performed by: OTOLARYNGOLOGY

## 2019-12-18 PROCEDURE — 25000003 PHARM REV CODE 250: Performed by: ANESTHESIOLOGY

## 2019-12-18 PROCEDURE — 25000003 PHARM REV CODE 250: Performed by: OTOLARYNGOLOGY

## 2019-12-18 PROCEDURE — 88304 TISSUE EXAM BY PATHOLOGIST: CPT | Mod: 26,,, | Performed by: PATHOLOGY

## 2019-12-18 PROCEDURE — 94761 N-INVAS EAR/PLS OXIMETRY MLT: CPT

## 2019-12-18 PROCEDURE — 88304 TISSUE EXAM BY PATHOLOGIST: CPT | Mod: 59 | Performed by: PATHOLOGY

## 2019-12-18 PROCEDURE — 42826 PR REMOVAL OF TONSILS,12+ Y/O: ICD-10-PCS | Mod: ,,, | Performed by: OTOLARYNGOLOGY

## 2019-12-18 PROCEDURE — 36000707: Performed by: OTOLARYNGOLOGY

## 2019-12-18 PROCEDURE — 25000003 PHARM REV CODE 250: Performed by: STUDENT IN AN ORGANIZED HEALTH CARE EDUCATION/TRAINING PROGRAM

## 2019-12-18 PROCEDURE — 88304 PR  SURG PATH,LEVEL III: ICD-10-PCS | Mod: 26,,, | Performed by: PATHOLOGY

## 2019-12-18 PROCEDURE — D9220A PRA ANESTHESIA: Mod: ,,, | Performed by: ANESTHESIOLOGY

## 2019-12-18 RX ORDER — PROPOFOL 10 MG/ML
VIAL (ML) INTRAVENOUS
Status: DISCONTINUED | OUTPATIENT
Start: 2019-12-18 | End: 2019-12-18

## 2019-12-18 RX ORDER — HYDROCODONE BITARTRATE AND ACETAMINOPHEN 5; 325 MG/1; MG/1
1 TABLET ORAL EVERY 4 HOURS PRN
Status: DISCONTINUED | OUTPATIENT
Start: 2019-12-18 | End: 2019-12-18

## 2019-12-18 RX ORDER — ROCURONIUM BROMIDE 10 MG/ML
INJECTION, SOLUTION INTRAVENOUS
Status: DISCONTINUED | OUTPATIENT
Start: 2019-12-18 | End: 2019-12-18

## 2019-12-18 RX ORDER — CLINDAMYCIN PHOSPHATE 900 MG/50ML
900 INJECTION, SOLUTION INTRAVENOUS
Status: COMPLETED | OUTPATIENT
Start: 2019-12-18 | End: 2019-12-18

## 2019-12-18 RX ORDER — ONDANSETRON 4 MG/1
4 TABLET, FILM COATED ORAL EVERY 6 HOURS PRN
Qty: 10 TABLET | Refills: 0 | Status: SHIPPED | OUTPATIENT
Start: 2019-12-18 | End: 2021-12-09 | Stop reason: CLARIF

## 2019-12-18 RX ORDER — CLINDAMYCIN PALMITATE HYDROCHLORIDE (PEDIATRIC) 75 MG/5ML
150 SOLUTION ORAL EVERY 6 HOURS
Qty: 300 ML | Refills: 0 | Status: SHIPPED | OUTPATIENT
Start: 2019-12-18 | End: 2019-12-25

## 2019-12-18 RX ORDER — LIDOCAINE HCL/PF 100 MG/5ML
SYRINGE (ML) INTRAVENOUS
Status: DISCONTINUED | OUTPATIENT
Start: 2019-12-18 | End: 2019-12-18

## 2019-12-18 RX ORDER — OXYCODONE HYDROCHLORIDE 5 MG/1
10 TABLET ORAL EVERY 4 HOURS PRN
Status: DISCONTINUED | OUTPATIENT
Start: 2019-12-18 | End: 2019-12-18 | Stop reason: HOSPADM

## 2019-12-18 RX ORDER — FENTANYL CITRATE 50 UG/ML
INJECTION, SOLUTION INTRAMUSCULAR; INTRAVENOUS
Status: DISCONTINUED | OUTPATIENT
Start: 2019-12-18 | End: 2019-12-18

## 2019-12-18 RX ORDER — MIDAZOLAM HYDROCHLORIDE 1 MG/ML
INJECTION, SOLUTION INTRAMUSCULAR; INTRAVENOUS
Status: DISCONTINUED | OUTPATIENT
Start: 2019-12-18 | End: 2019-12-18

## 2019-12-18 RX ORDER — HYDROCODONE BITARTRATE AND ACETAMINOPHEN 7.5; 325 MG/15ML; MG/15ML
15 SOLUTION ORAL EVERY 4 HOURS PRN
Status: DISCONTINUED | OUTPATIENT
Start: 2019-12-18 | End: 2019-12-18 | Stop reason: HOSPADM

## 2019-12-18 RX ORDER — FENTANYL CITRATE 50 UG/ML
25 INJECTION, SOLUTION INTRAMUSCULAR; INTRAVENOUS EVERY 5 MIN PRN
Status: DISCONTINUED | OUTPATIENT
Start: 2019-12-18 | End: 2019-12-18 | Stop reason: HOSPADM

## 2019-12-18 RX ORDER — ONDANSETRON 8 MG/1
8 TABLET, ORALLY DISINTEGRATING ORAL EVERY 6 HOURS PRN
Status: DISCONTINUED | OUTPATIENT
Start: 2019-12-18 | End: 2019-12-18 | Stop reason: HOSPADM

## 2019-12-18 RX ORDER — SODIUM CHLORIDE 0.9 % (FLUSH) 0.9 %
10 SYRINGE (ML) INJECTION
Status: DISCONTINUED | OUTPATIENT
Start: 2019-12-18 | End: 2019-12-18 | Stop reason: HOSPADM

## 2019-12-18 RX ORDER — ACETAMINOPHEN 325 MG/1
650 TABLET ORAL EVERY 4 HOURS PRN
Status: DISCONTINUED | OUTPATIENT
Start: 2019-12-18 | End: 2019-12-18 | Stop reason: HOSPADM

## 2019-12-18 RX ORDER — HYDROMORPHONE HYDROCHLORIDE 1 MG/ML
0.2 INJECTION, SOLUTION INTRAMUSCULAR; INTRAVENOUS; SUBCUTANEOUS EVERY 5 MIN PRN
Status: DISCONTINUED | OUTPATIENT
Start: 2019-12-18 | End: 2019-12-18 | Stop reason: HOSPADM

## 2019-12-18 RX ORDER — SODIUM CHLORIDE 9 MG/ML
INJECTION, SOLUTION INTRAVENOUS CONTINUOUS PRN
Status: DISCONTINUED | OUTPATIENT
Start: 2019-12-18 | End: 2019-12-18

## 2019-12-18 RX ORDER — KETAMINE HCL IN 0.9 % NACL 50 MG/5 ML
SYRINGE (ML) INTRAVENOUS
Status: DISCONTINUED | OUTPATIENT
Start: 2019-12-18 | End: 2019-12-18

## 2019-12-18 RX ORDER — LIDOCAINE HYDROCHLORIDE 10 MG/ML
1 INJECTION, SOLUTION EPIDURAL; INFILTRATION; INTRACAUDAL; PERINEURAL ONCE
Status: DISCONTINUED | OUTPATIENT
Start: 2019-12-18 | End: 2019-12-18 | Stop reason: HOSPADM

## 2019-12-18 RX ORDER — HYDROCODONE BITARTRATE AND ACETAMINOPHEN 7.5; 325 MG/15ML; MG/15ML
10 SOLUTION ORAL 4 TIMES DAILY PRN
Qty: 473 ML | Refills: 0 | Status: SHIPPED | OUTPATIENT
Start: 2019-12-18 | End: 2019-12-23 | Stop reason: SDUPTHER

## 2019-12-18 RX ORDER — SUCCINYLCHOLINE CHLORIDE 20 MG/ML
INJECTION INTRAMUSCULAR; INTRAVENOUS
Status: DISCONTINUED | OUTPATIENT
Start: 2019-12-18 | End: 2019-12-18

## 2019-12-18 RX ORDER — METOCLOPRAMIDE HYDROCHLORIDE 5 MG/ML
5 INJECTION INTRAMUSCULAR; INTRAVENOUS EVERY 6 HOURS PRN
Status: DISCONTINUED | OUTPATIENT
Start: 2019-12-18 | End: 2019-12-18 | Stop reason: HOSPADM

## 2019-12-18 RX ORDER — DEXAMETHASONE SODIUM PHOSPHATE 4 MG/ML
INJECTION, SOLUTION INTRA-ARTICULAR; INTRALESIONAL; INTRAMUSCULAR; INTRAVENOUS; SOFT TISSUE
Status: DISCONTINUED | OUTPATIENT
Start: 2019-12-18 | End: 2019-12-18

## 2019-12-18 RX ORDER — CLINDAMYCIN HYDROCHLORIDE 300 MG/1
300 CAPSULE ORAL EVERY 8 HOURS
Qty: 21 CAPSULE | Refills: 0 | Status: SHIPPED | OUTPATIENT
Start: 2019-12-18 | End: 2019-12-18 | Stop reason: HOSPADM

## 2019-12-18 RX ORDER — OXYMETAZOLINE HCL 0.05 %
SPRAY, NON-AEROSOL (ML) NASAL
Status: DISCONTINUED | OUTPATIENT
Start: 2019-12-18 | End: 2019-12-18 | Stop reason: HOSPADM

## 2019-12-18 RX ADMIN — FENTANYL CITRATE 25 MCG: 50 INJECTION INTRAMUSCULAR; INTRAVENOUS at 09:12

## 2019-12-18 RX ADMIN — Medication 30 MG: at 08:12

## 2019-12-18 RX ADMIN — MIDAZOLAM HYDROCHLORIDE 2 MG: 1 INJECTION, SOLUTION INTRAMUSCULAR; INTRAVENOUS at 07:12

## 2019-12-18 RX ADMIN — ROCURONIUM BROMIDE 20 MG: 10 INJECTION, SOLUTION INTRAVENOUS at 08:12

## 2019-12-18 RX ADMIN — FENTANYL CITRATE 50 MCG: 50 INJECTION, SOLUTION INTRAMUSCULAR; INTRAVENOUS at 08:12

## 2019-12-18 RX ADMIN — DEXAMETHASONE SODIUM PHOSPHATE 8 MG: 4 INJECTION, SOLUTION INTRAMUSCULAR; INTRAVENOUS at 08:12

## 2019-12-18 RX ADMIN — Medication 20 MG: at 08:12

## 2019-12-18 RX ADMIN — CLINDAMYCIN IN 5 PERCENT DEXTROSE 900 MG: 18 INJECTION, SOLUTION INTRAVENOUS at 07:12

## 2019-12-18 RX ADMIN — SUCCINYLCHOLINE CHLORIDE 120 MG: 20 INJECTION, SOLUTION INTRAMUSCULAR; INTRAVENOUS at 08:12

## 2019-12-18 RX ADMIN — HYDROCODONE BITARTRATE AND ACETAMINOPHEN 15 ML: 7.5; 325 SOLUTION ORAL at 09:12

## 2019-12-18 RX ADMIN — LIDOCAINE HYDROCHLORIDE 100 MG: 20 INJECTION, SOLUTION INTRAVENOUS at 08:12

## 2019-12-18 RX ADMIN — SODIUM CHLORIDE: 0.9 INJECTION, SOLUTION INTRAVENOUS at 08:12

## 2019-12-18 RX ADMIN — PROPOFOL 100 MG: 10 INJECTION, EMULSION INTRAVENOUS at 08:12

## 2019-12-18 RX ADMIN — PROPOFOL 200 MG: 10 INJECTION, EMULSION INTRAVENOUS at 08:12

## 2019-12-18 NOTE — BRIEF OP NOTE
[facility]  Brief Operative Note     SUMMARY     Surgery Date:   12/18/2019     SURGEON(S):   Surgeon(s) and Role:     * Geovani Steen MD - Primary     * Teo Andrade MD - Resident - Assisting    ANESTHESIA STAFF:   No responsible provider has been recorded for the case.    OR STAFF:   Circulator: Zeinab Real RN  Scrub Person: Yasir Cuevas      Pre-op Diagnosis:  Tonsillitis [J03.90]    Post-op Diagnosis:  Post-Op Diagnosis Codes:     * Tonsillitis [J03.90]    Procedure(s) (LRB):  TONSILLECTOMY (Bilateral)    Anesthesia: General    Description of the procedure: brenda tonsillectomy     Findings: tonsils embedded and cryptic bilaterally    Estimated Blood Loss: 5cc         Specimens:   Specimen (12h ago, onward)    None          LENGTH OF SURGERY:   0 Hr 47 Min 17 Sec    Event Time In Time Out   In Facility 0610    In Pre-Procedure 0707    Physician Available     Anesthesia Available     Pre-Op: Bedside Procedure Start     Pre-Op: Bedside Procedure Stop     Pre-Procedure Complete 0744    Out of Pre-Procedure     Holding Start     Holding Stop     Anesthesia Start 0800    Anesthesia Start Data Collection     Setup Start     Setup Complete     In Room 0800    Prep Start     Procedure Prep Complete     Procedure Start 0825    Procedure Closing 1034    Emergence 0838    Procedure Finish 0838    Out of Room 0847    In OR Recovery     Out of OR Recovery     Cleanup Start     Cleanup Complete     Cosmetic Start     Cosmetic Stop     Pain Mgmt In Room     Pain Mgmt Out Room     In Recovery 0849    Anesthesia Finish 0853    Bedside Procedure Start     Bedside Procedure Stop     Recovery Care Complete     Out of Recovery     In Diagnostic Recovery     Out Diagnostic Recovery     In PACU Phase II     Out PACU Phase II     In PACU Ext     Out PACU Ext     In Recovery DOSC     Out Recovery DOSC     Obs Rec Start     Obs Rec Stop     To Phase II     In Phase II     Phase II Care Complete     Out of  Phase II     In Phase II Ext     Out Phase II Ext     Procedural Care Complete     Pain Follow Up Needed     Pain Follow Up Complete     PACU Bed Request       Discharge Note    SUMMARY     Admit Date: 12/18/2019    Discharge Date and Time:   9:04 AM    Hospital Course: Please see the preoperative H&P and other available documentation for full details related to history prior to this admission.  Briefly, pt presented to [facility] for elective outpatient procedure. Procedure, risks and benefits were discussed with patient. All questions were answered. Informed consent was obtained. Pt was taken to the OR and underwent the above procedures without complications. Pt  was transferred to PACU in stable condition and discharged to home the same day.     Final Diagnosis: Post-Op Diagnosis Codes:     * Tonsillitis [J03.90]    Disposition: Home/Self Care    Discharge Medication List:   MelvinRosa Donya   Home Medication Instructions ASHANTI:[id number]    Printed on:12/18/19 0904   Medication Information                      citalopram (CELEXA) 40 MG tablet  TK 1 T PO  QAM             clindamycin (CLEOCIN) 300 MG capsule  Take 1 capsule (300 mg total) by mouth every 8 (eight) hours. for 7 days             dextroamphetamine-amphetamine (ADDERALL XR) 10 MG 24 hr capsule  Take 10 mg by mouth 2 (two) times daily.             hydrocodone-acetaminophen (HYCET) solution 7.5-325 mg/15mL  Take 10 mLs by mouth 4 (four) times daily as needed for Pain.             ibuprofen (ADVIL,MOTRIN) 800 MG tablet  Take 1 tablet (800 mg total) by mouth 3 (three) times daily.             mirtazapine (REMERON) 15 MG tablet  TK 1 T PO  QHS             naproxen (NAPROSYN) 500 MG tablet  Take 1 tablet (500 mg total) by mouth 2 (two) times daily with meals.             ondansetron (ZOFRAN) 4 MG tablet  Take 1 tablet (4 mg total) by mouth every 6 (six) hours as needed for Nausea.             OXcarbazepine (TRILEPTAL) 150 MG Tab  TK 1 T PO  QAM              OXcarbazepine (TRILEPTAL) 300 MG Tab  TK 1 T PO QHS             pantoprazole (PROTONIX) 40 MG tablet  Take 1 tablet (40 mg total) by mouth once daily.                 Discharge Procedure Orders   Diet general     Call MD for:  difficulty breathing, headache or visual disturbances     Call MD for:  redness, tenderness, or signs of infection (pain, swelling, redness, odor or green/yellow discharge around incision site)     Call MD for:  hives     Call MD for:   Order Comments: Persistent bleeding     No dressing needed     Activity as tolerated       Follow Up:   Follow-up Information     Geovani Steen MD In 3 weeks.    Specialty:  Otolaryngology  Contact information:  [address] [facility] [id number] [phone]

## 2019-12-18 NOTE — TRANSFER OF CARE
"Anesthesia Transfer of Care Note    Patient: Rosa Charles    Procedure(s) Performed: Procedure(s) (LRB):  TONSILLECTOMY (Bilateral)    Patient location: PACU    Anesthesia Type: general    Transport from OR: Transported from OR on room air with adequate spontaneous ventilation    Post pain: adequate analgesia    Post assessment: no apparent anesthetic complications and tolerated procedure well    Post vital signs: stable    Level of consciousness: awake    Nausea/Vomiting: no nausea/vomiting    Complications: none    Transfer of care protocol was followed      Last vitals:   Visit Vitals  BP (!) 140/81 (BP Location: Right arm, Patient Position: Lying)   Pulse 99   Temp 36.4 °C (97.5 °F) (Temporal)   Resp 12   Ht 5' 4" (1.626 m)   Wt 95.3 kg (210 lb)   LMP 12/15/2019   SpO2 99%   Breastfeeding? No   BMI 36.05 kg/m²     "

## 2019-12-18 NOTE — ANESTHESIA POSTPROCEDURE EVALUATION
Anesthesia Post Evaluation    Patient: Rosa Charles    Procedure(s) Performed: Procedure(s) (LRB):  TONSILLECTOMY (Bilateral)    Final Anesthesia Type: general    Patient location during evaluation: [facility]  Patient participation: Yes- Able to Participate  Level of consciousness: awake and alert  Post-procedure vital signs: reviewed and stable  Pain management: adequate  Airway patency: patent    PONV status at discharge: No PONV  Anesthetic complications: no      Cardiovascular status: blood pressure returned to baseline  Respiratory status: unassisted  Hydration status: euvolemic  Follow-up not needed.          Vitals Value Taken Time   /76 12/18/2019 10:20 AM   Temp 36.7 °C (98 °F) 12/18/2019 10:20 AM   Pulse 94 12/18/2019 10:21 AM   Resp 18 12/18/2019 10:20 AM   SpO2 97 % 12/18/2019 10:21 AM   Vitals shown include unvalidated device data.      Event Time     Out of Recovery 09:30:00          Pain/Priyanka Score: Pain Rating Prior to Med Admin: 6 (12/18/2019  9:25 AM)  Pain Rating Post Med Admin: 5 (12/18/2019 10:20 AM)  Priyanka Score: 10 (12/18/2019 10:20 AM)

## 2019-12-18 NOTE — H&P
Chief Complaint   Patient presents with    tonsillectomy    ENT H&P     HPI       29 y.o. female presents for surgery for her chronic tonsillitis.  She reports that she has had at least 3 episodes of tonsillitis since May of this year.  She has been treated with antibiotics.  Most recently, she was treated roughly 2 weeks prior to being seen in office and reports that she continues to feel ill.  She reports a history of recurrent tonsillitis prior to these 3 months.  She also endorses snoring.     Review of Systems     Constitutional: Negative for fatigue and unexpected weight change.   HENT: Per HPI.  Eyes: Negative for visual disturbance.   Respiratory: Negative for shortness of breath, hemoptysis   Cardiovascular: Negative for chest pain and palpitations.   Musculoskeletal: Negative for decreased ROM, back pain.   Skin: Negative for rash, sunburn, itching.   Neurological: Negative for dizziness and seizures.   Hematological: Negative for adenopathy. Does not bruise/bleed easily.   Endocrine: Negative for rapid weight loss/weight gain, heat/cold intolerance.      Past Medical History   Patient Active Problem List   Diagnosis    Anxiety    Depression with suicidal ideation    Sore throat    Tonsillitis               Past Surgical History         Past Surgical History:   Procedure Laterality Date    ANTERIOR VAGINAL REPAIR   2010    SALPINGECTOMY Bilateral 2014    VULVA SURGERY         BREE            Family History         Family History   Problem Relation Age of Onset    Breast cancer Paternal Grandmother      Breast cancer Maternal Grandmother      Ovarian cancer Mother      Cervical cancer Mother      Lung cancer Mother      Multiple myeloma Maternal Aunt      Colon cancer Neg Hx      Cancer Neg Hx      Diabetes Neg Hx      Hypertension Neg Hx      Eclampsia Neg Hx      Miscarriages / Stillbirths Neg Hx       labor Neg Hx      Stroke Neg Hx                 Social History   .  Social  History               Socioeconomic History    Marital status:        Spouse name: Not on file    Number of children: Not on file    Years of education: Not on file    Highest education level: Not on file   Occupational History    Not on file   Social Needs    Financial resource strain: Not on file    Food insecurity:       Worry: Not on file       Inability: Not on file    Transportation needs:       Medical: Not on file       Non-medical: Not on file   Tobacco Use    Smoking status: Former Smoker       Types: Cigarettes    Smokeless tobacco: Former User       Quit date: 2/27/2009   Substance and Sexual Activity    Alcohol use: Yes       Comment: occas    Drug use: No    Sexual activity: Yes       Partners: Male       Birth control/protection: See Surgical Hx   Lifestyle    Physical activity:       Days per week: Not on file       Minutes per session: Not on file    Stress: Not on file   Relationships    Social connections:       Talks on phone: Not on file       Gets together: Not on file       Attends [employer] service: Not on file       Active member of club or organization: Not on file       Attends meetings of clubs or organizations: Not on file       Relationship status: Not on file   Other Topics Concern    Not on file   Social History Narrative    Not on file               Allergies        Review of patient's allergies indicates:   Allergen Reactions    Amoxicillin Hives               Physical Exam      There were no vitals filed for this visit.       General: AOx3, NAD   Respiratory:  Symmetric chest rise, normal effort  Nose: No gross nasal septal deviation. Inferior Turbinates WNL bilaterally. No septal perforation. No masses/lesions.   Oral Cavity:  Oral Tongue mobile, no lesions noted. Hard Palate WNL. No buccal or FOM lesions.  Oropharynx:  No masses/lesions of the posterior pharyngeal wall. Tonsillar fossa without lesions. Tonsils 2+.  Soft palate without masses. Midline  uvula.   Neck: No scars.  No cervical lymphadenopathy, thyromegaly or thyroid nodules.  Normal range of motion.    Face: House Brackmann I bilaterally.      Assessment/Plan  Recurrent / Chronic tonsillitis. To OR for tonsillectomy       Teo Andrade,   Otorhinolaryngology-Head & Neck Surgery  [facility]  12/18/2019

## 2019-12-18 NOTE — PLAN OF CARE
Pt is AAOx4. VSS. NAD. IV discontinued. Pain and nausea tolerable. Discharge instructions given. Verbalized understanding. Discharged home with family.

## 2019-12-18 NOTE — OP NOTE
DATE OF PROCEDURE: 12/18/2019     PREOPERATIVE DIAGNOSES:   Chronic tonsillitis    POSTOPERATIVE DIAGNOSES:   Same    SURGEON:  Surgeon(s) and Role:     * Geovani Steen MD - Primary     * Teo Andrade MD - Resident - Assisting      PROCEDURES PERFORMED:   Tonsillectomy    ANESTHESIA: General      INDICATIONS FOR PROCEDURE:   Rosa Charles is a 29 y.o. woman who presented to me for evaluation for tonsillectomy in the setting of chronic tonsillitis.  She was deemed to be an appropriate candidate for surgery.    She was apprised of the risks, benefits and alternatives to surgery.  In spite of the risk inherent to surgery,she provided informed consent for the aforementioned procedures.     PROCEDURE IN DETAIL:  The patient was taken to the operating room and placed on the operating table in the supine position.  General endotracheal anesthesia was induced by the anesthesia team.     The operating table was rotated 90° to the right.  The fraction of inspired oxygen was reduced below 40% by anesthesia. The Hua-Marco A mouth gag was inserted, expanded and suspended from Francois stand.  The soft palate was retracted with a red rubber catheter.  To begin, a tonsillectomy was carried out on the right side utilizing the Bovie.  Dissection proceeded in the subcapsular plane utilizing the Bovie for dissection and for hemostasis.  Once the right tonsil had been amputated, it was sent to pathology for permanent analysis.  An identical procedure was then carried on the left side.  The left tonsil was also sent to pathology for permanent analysis.  Hemostasis was achieved in the tonsillar fossa utilizing suction Bovie.  The gastric contents were emptied with an orogastric tube.  The mouth gag was then let down and discarded.  The patient was handed back to anesthesia. She was awakened, extubated and transferred to recovery in satisfactory condition.    There were no intraoperative complications.  I was present for and  participated in the entire procedure as dictated above.       ESTIMATED BLOOD LOSS:  10 mL    SPECIMENS:   Specimen (12h ago, onward)    Right and left tonsils for permanent

## 2019-12-18 NOTE — PLAN OF CARE
Patient waiting for antibiotic to be changed from capsule to liquid. Waiting for bedside delivery.

## 2019-12-18 NOTE — ANESTHESIA PROCEDURE NOTES
Intubation  Performed by: Vidya Woo MD  Authorized by: Vidya Woo MD     Intubation:     Induction:  Intravenous    Intubated:  Postinduction    Mask Ventilation:  Moderately difficult with oral airway    Attempts:  2    Attempted By:  Staff anesthesiologist    Method of Intubation:  Direct    Blade:  Hailey 3    Laryngeal View Grade: Grade III - only epiglottis visible      Attempted By (2nd Attempt):  Staff anesthesiologist    Method of Intubation (2nd Attempt):  Bougie    Blade (2nd Attempt):  Hailey 3    Laryngeal View Grade (2nd Attempt): Grade III - only epiglottis visible      Difficult Airway Encountered?: Yes      Complications:  None    Airway Device:  Oral endotracheal tube    Airway Device Size:  7.0    Style/Cuff Inflation:  Cuffed    Inflation Amount (mL):  7    Tube secured:  21    Secured at:  The teeth    Placement Verified By:  Capnometry    Complicating Factors:  None, anterior larynx, obesity and short neck    Findings Post-Intubation:  BS equal bilateral and atraumatic/condition of teeth unchanged

## 2019-12-18 NOTE — DISCHARGE INSTRUCTIONS
Adult Tonsillectomy  The tonsils are 2 small masses of tissue at the back of the throat. They are part of the bodys immune system. This helps the body fight disease. In some people, the tonsils become infected or enlarged. This can cause severe sore throats, snoring, or other problems. Tonsillectomy is surgery to remove the tonsils. Tonsillectomy may be recommended if you have obstruction causing sleep apnea, or recurring, chronic, or severe infections. This sheet tells you more about this surgery and what to expect.    Preparing for surgery  Prepare as you have been told. Tell your healthcare provider about all medicine you take. This includes over-the-counter drugs. It also includes herbs and other supplements. You may need to stop taking some or all of them before surgery as directed by your healthcare provider. Also, follow any directions youre given for not eating or drinking before surgery.  The day of surgery  The surgery takes about 60 minutes. You will likely go home on the same day.  Before the surgery  Here is what to expect before the surgery begins:  · An IV line is put into a vein in your arm or hand. This line supplies fluids and medicines.  · To keep you free of pain during the surgery, youre given general anesthesia. This medicine puts you into a state like deep sleep through the surgery.  During the surgery  Here is what to expect during the surgery:  · A special device is used to keep the mouth open.  · Other tools are used to remove the tonsils from the back of the throat. The tissue is taken out through the mouth.  · The device holding the mouth open is then removed.  After the surgery  You will be taken to a recovery room. Healthcare staff will make sure you can drink some liquids. They will also make sure your pain is being managed. When you are ready to leave the hospital, you will need to be driven home by an adult family member or friend.  Recovering at home  It will likely take about  2 weeks to heal from the surgery. During your recovery:  · Expect to have throat pain. You may also feel pain in your ears. This is referred pain from the throat, and is normal. Your post-surgery pain may come and go. It may be worse on the 4th or 5th day after surgery.  · Talk as little as possible, if it is painful.  · Take pain medicine as directed.  · Do not drive while you are on opioid or narcotic pain medicine. Expect to feel sleepy or dizzy while you are taking this medicine.  · Do not use ibuprofen or aspirin for 14 days after surgery unless your healthcare provider says its OK. You may use acetaminophen as directed.  · Use 2 or 3 pillows under your head while resting. This will help keep swelling down.  · Drink lots of chilled liquids. Water, noncitrus juices, and frozen juice bars are good choices.  · Eat cold foods and soft foods, which are easiest to swallow. Try foods like ice cream, gelatin, scrambled eggs, pasta, and mashed potatoes.  · Avoid foods that require a lot of chewing. Also avoid foods that may scratch the throat, like toast or potato chips. Avoid hot, spicy, or acidic foods.  · Avoid strenuous activity for 2 to 3 weeks after surgery.  · Be aware that white patches will form in the throat during healing. These are scabs and are not a sign of infection. The patches will come off in 1 to 2 weeks and may cause bleeding. To minimize bleeding, drink lots of fluids. Gargling with cold water can help.  Call the healthcare provider  Call your healthcare provider if you have any of the following:  · Chest pain or trouble breathing (call 911)  · Fever of 100.4°F (38°C) or higher, or as directed by your healthcare provider  · Bright red bleeding from the mouth or nose  · Severe pain not relieved by medicine  · Signs of dehydration (dark urine, urinating less often)  · Heavy or persistent bleeding in the throat at any time  · Other signs or symptoms as indicated by your healthcare provider    Follow-up  Schedule a follow-up visit with your healthcare provider as advised. During this visit, the healthcare provider will make sure you are healing well. Ask any questions you have about the surgery or your recovery.  Risks and possible complications   Risks of this surgery include:  · Infection  · Bleeding  · Injury to the lips or teeth  · Painful swallowing during recovery  · The need for a second surgery  · Risks of anesthesia        Discharge Instructions: Eating a Soft Diet  You have been prescribed a soft diet (also called gastrointestinal soft diet or bland diet). This reduces the amount of work your digestive tract has to do. It also reduces the chance that your digestive tract will be irritated by the food you eat. A soft diet is prescribed for people with digestive problems. The diet consists of foods that are tender, mildly seasoned, and easy to digest. While on this diet, you should not eat fried or spicy foods, or raw fruits and vegetables. Also avoid alcoholic beverages.  General guidelines  · Eat in a calm, relaxed atmosphere. How you eat may be as important as what you eat. Dont rush while eating. Chew your food slowly and thoroughly, and swallow slowly.  · Eat small frequent meals throughout the day, but dont eat within 2 hours of bedtime.  · Avoid any foods that cause discomfort.  · Dont use NSAIDs (nonsteroidal anti-inflammatory drugs), such as aspirin, and ibuprofen. Also avoid medicine that contain aspirin. NSAIDs can cause ulcers and delay or prevent ulcer healing.  · Use antacids as needed, but keep in mind that magnesium-containing antacids may cause diarrhea.  Foods to eat  · Cream of wheat and cream of rice  · Cooked white rice  · Mashed potatoes, and boiled potatoes without skin  · Plain pasta and noodles  · Plain white crackers (such as no-salt soda crackers)  · White bread  · Applesauce  · Cooked fruits without skins or seeds  · Mild juices, such as apple and  grape  · Bananas  · Cooked or mashed vegetables without stems and seeds  ¨ Carrots  ¨ Summer squash (zucchini, yellow squash)  ¨ Winter squash (acorn, butternut, spaghetti squash)  · Cottage cheese  · Mild hard or soft cheeses  · Custard  · Yogurt without seeds or nuts  · Milk (you may need lactose-free milk)  · Ice cream without seeds or nuts  · Smooth peanut butter  · Eggs  · Fish, turkey, chicken, or other meat that is not tough or stringy  · Tofu  Foods to avoid  · Nuts and seeds  · Snack foods, such as the following:  ¨ Chocolate-containing snacks, candy, pastries, or cakes.  ¨ Potato chips (plain, barbecued, or other flavors)  ¨ Taco chips or nachos  ¨ Corn chips  ¨ Popcorn, popcorn cakes, or rice cakes  ¨ Crackers with nuts, seeds, or spicy seasonings  ¨ French fries  · Fried or greasy foods  · Whole-grain breads, rolls, and crackers  · Breads and rolls with nuts, seeds, or bran  · Bran and granola cereals  · Berries with seeds, such as strawberries, raspberries, and blackberries  · Acidic fruits, such as oranges, grapefruits, jessy, limes, and pineapples  · Raw vegetables  · Mild or hot peppers  · Sauerkraut and pickled vegetables  · Tomatoes or tomato products, such as tomato paste, tomato sauce, and tomato juice  · Barbecue sauce  · Spicy or flavored cheeses, such as jalapeño and black pepper cheese  · Crunchy peanut butter  · Dried cooked beans, such as abreu, kidney, or navy beans  · The following meats:  ¨ Fried or greasy meats  ¨ Processed, spicy meats, such as sausage, cabello, ham, and lunch meats  ¨ Ribs and other meats with barbecue sauce  ¨ Tough or stringy meats, such as corned beef or beef jerky  Fluids to avoid  · Alcoholic beverages  · Coffee and regular teas  · Ryland and other drinks with caffeine  · Cranberry, orange, pineapple, and grapefruit juice  · Lemonade  · Vegetable juice  · Whole milk, if you are lactose intolerant

## 2019-12-20 ENCOUNTER — PATIENT MESSAGE (OUTPATIENT)
Dept: OTOLARYNGOLOGY | Facility: CLINIC | Age: 29
End: 2019-12-20

## 2019-12-23 ENCOUNTER — PATIENT MESSAGE (OUTPATIENT)
Dept: OTOLARYNGOLOGY | Facility: CLINIC | Age: 29
End: 2019-12-23

## 2019-12-23 RX ORDER — HYDROCODONE BITARTRATE AND ACETAMINOPHEN 7.5; 325 MG/15ML; MG/15ML
10 SOLUTION ORAL EVERY 6 HOURS PRN
Qty: 473 ML | Refills: 0 | Status: SHIPPED | OUTPATIENT
Start: 2019-12-23 | End: 2019-12-23 | Stop reason: SDUPTHER

## 2019-12-23 RX ORDER — HYDROCODONE BITARTRATE AND ACETAMINOPHEN 7.5; 325 MG/15ML; MG/15ML
10 SOLUTION ORAL EVERY 6 HOURS PRN
Qty: 473 ML | Refills: 0 | Status: SHIPPED | OUTPATIENT
Start: 2019-12-23 | End: 2021-12-09 | Stop reason: CLARIF

## 2019-12-30 ENCOUNTER — PATIENT MESSAGE (OUTPATIENT)
Dept: OTOLARYNGOLOGY | Facility: CLINIC | Age: 29
End: 2019-12-30

## 2020-01-02 LAB
FINAL PATHOLOGIC DIAGNOSIS: NORMAL
GROSS: NORMAL

## 2020-02-06 ENCOUNTER — HOSPITAL ENCOUNTER (EMERGENCY)
Facility: HOSPITAL | Age: 30
Discharge: HOME | End: 2020-02-06
Attending: EMERGENCY MEDICINE
Payer: MEDICAID

## 2020-02-06 VITALS
OXYGEN SATURATION: 98 % | DIASTOLIC BLOOD PRESSURE: 87 MMHG | TEMPERATURE: 99 F | HEART RATE: 98 BPM | HEIGHT: 64 IN | RESPIRATION RATE: 16 BRPM | BODY MASS INDEX: 34.15 KG/M2 | WEIGHT: 200 LBS | SYSTOLIC BLOOD PRESSURE: 126 MMHG

## 2020-02-06 DIAGNOSIS — B34.9 VIRAL SYNDROME: Primary | ICD-10-CM

## 2020-02-06 LAB
INFLUENZA A, MOLECULAR: NEGATIVE
INFLUENZA B, MOLECULAR: NEGATIVE
SPECIMEN SOURCE: NORMAL

## 2020-02-06 PROCEDURE — 25000003 PHARM REV CODE 250: Performed by: EMERGENCY MEDICINE

## 2020-02-06 PROCEDURE — 99284 EMERGENCY DEPT VISIT MOD MDM: CPT

## 2020-02-06 PROCEDURE — 87502 INFLUENZA DNA AMP PROBE: CPT

## 2020-02-06 RX ORDER — BENZONATATE 100 MG/1
200 CAPSULE ORAL 3 TIMES DAILY PRN
Qty: 15 CAPSULE | Refills: 0 | Status: SHIPPED | OUTPATIENT
Start: 2020-02-06 | End: 2021-12-09 | Stop reason: CLARIF

## 2020-02-06 RX ORDER — BENZONATATE 100 MG/1
200 CAPSULE ORAL
Status: COMPLETED | OUTPATIENT
Start: 2020-02-06 | End: 2020-02-06

## 2020-02-06 RX ORDER — DIPHENOXYLATE HYDROCHLORIDE AND ATROPINE SULFATE 2.5; .025 MG/1; MG/1
2 TABLET ORAL 4 TIMES DAILY PRN
Qty: 14 TABLET | Refills: 0 | Status: SHIPPED | OUTPATIENT
Start: 2020-02-06 | End: 2020-02-16

## 2020-02-06 RX ORDER — NAPROXEN 250 MG/1
500 TABLET ORAL
Status: COMPLETED | OUTPATIENT
Start: 2020-02-06 | End: 2020-02-06

## 2020-02-06 RX ORDER — METHYLPREDNISOLONE ACETATE 80 MG/ML
80 INJECTION, SUSPENSION INTRA-ARTICULAR; INTRALESIONAL; INTRAMUSCULAR; SOFT TISSUE ONCE
Status: DISCONTINUED | OUTPATIENT
Start: 2020-02-06 | End: 2020-02-07 | Stop reason: HOSPADM

## 2020-02-06 RX ADMIN — NAPROXEN 500 MG: 250 TABLET ORAL at 08:02

## 2020-02-06 RX ADMIN — BENZONATATE 200 MG: 100 CAPSULE ORAL at 08:02

## 2020-02-06 RX ADMIN — Medication 2 SPRAY: at 08:02

## 2020-02-07 NOTE — ED NOTES
C/o all over body aches with fever and sore throat x2 days. Also c/o bilat ear pain and fullness. Alert calm even non labored respirations. Call light in reach aware to notify nurse of needs or concerns
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
## 2020-03-06 ENCOUNTER — OFFICE VISIT (OUTPATIENT)
Dept: OBSTETRICS AND GYNECOLOGY | Facility: CLINIC | Age: 30
End: 2020-03-06
Payer: MEDICAID

## 2020-03-06 VITALS
BODY MASS INDEX: 35.84 KG/M2 | DIASTOLIC BLOOD PRESSURE: 82 MMHG | SYSTOLIC BLOOD PRESSURE: 130 MMHG | WEIGHT: 208.75 LBS

## 2020-03-06 DIAGNOSIS — N89.8 VAGINAL ODOR: Primary | ICD-10-CM

## 2020-03-06 PROCEDURE — 99214 PR OFFICE/OUTPT VISIT, EST, LEVL IV, 30-39 MIN: ICD-10-PCS | Mod: S$PBB,,, | Performed by: OBSTETRICS & GYNECOLOGY

## 2020-03-06 PROCEDURE — 87661 TRICHOMONAS VAGINALIS AMPLIF: CPT

## 2020-03-06 PROCEDURE — 99214 OFFICE O/P EST MOD 30 MIN: CPT | Mod: S$PBB,,, | Performed by: OBSTETRICS & GYNECOLOGY

## 2020-03-06 PROCEDURE — 99999 PR PBB SHADOW E&M-EST. PATIENT-LVL III: CPT | Mod: PBBFAC,,, | Performed by: OBSTETRICS & GYNECOLOGY

## 2020-03-06 PROCEDURE — 99213 OFFICE O/P EST LOW 20 MIN: CPT | Mod: PBBFAC,PN | Performed by: OBSTETRICS & GYNECOLOGY

## 2020-03-06 PROCEDURE — 87481 CANDIDA DNA AMP PROBE: CPT | Mod: 59

## 2020-03-06 PROCEDURE — 99999 PR PBB SHADOW E&M-EST. PATIENT-LVL III: ICD-10-PCS | Mod: PBBFAC,,, | Performed by: OBSTETRICS & GYNECOLOGY

## 2020-03-07 NOTE — PROGRESS NOTES
No chief complaint on file.      History of Present Illness   29 y.o.  female patient presents today for nontender lump on right labia, h/o cancerous changes on the vulva - treated with laser in distant past. Also vaginal odor and recurrent vulvr boils - mother with hydradenitis and  of GYN cancer.    Past medical and surgical history reviewed.   I have reviewed the patient's medical history in detail and updated the computerized patient record.    Review of patient's allergies indicates:   Allergen Reactions    Amoxicillin Hives         Review of Systems - Negative except HPI  GEN ROS: negative for - chills or fever  Breast ROS: negative for breast lumps  Genito-Urinary ROS: no dysuria, trouble voiding, or hematuria      Physical Examination:  /82   Wt 94.7 kg (208 lb 12.4 oz)   LMP 2020   BMI 35.84 kg/m²    Constitutional: She appears alert and responsive. She appears well-developed, well-groomed, and well-nourished. No distress OverWeight   HENT:   Head: Normocephalic and atraumatic.   Eyes: Conjunctivae and EOM are normal. No scleral icterus.   Neck: Symmetrical. Normal range of motion. Neck supple. No tracheal deviation present. THYROID: without masses or tenderness.  Cardiovascular: Normal rate, no rhythm abnormality noted. Extremities without swelling or edema, warm.    Pulmonary/Chest: Normal respiratory Effort. No distress or retractions. She exhibits no tenderness.  Abdominal: Soft. She exhibits no distension, hernias or masses. There is no tenderness. No enlargement of liver edge or spleen.  There is no rebound and no guarding.   Genitourinary:    External rectal exam shows no thrombosed external hemorrhoids, no lesions.     Pelvic exam was performed with patient supine.   No labial fusion, and symmetrical.    There is no rash, lesion or injury on the right labia.   There is no rash, lesion or injury on the left labia.   No bleeding and no signs of injury around the vaginal  introitus, urethral meatus is normal size and without prolapse or lesions, urethra well supported. The cervix is visualized with no discharge, lesions or friability.   No vaginal discharge found.    No significant Cystocele, Enterocele or rectocele, and cervix and uterus well supported.   Bimanual exam:   The urethra is normal to palpation and there are no palpable vaginal wall masses.   Uterus is not deviated, not enlarged, not fixed, normal shape and not tender.   Cervix exhibits no motion tenderness.    Right adnexum displays no mass or nodularity and no tenderness.   Left adnexum displays no mass or nodularity and no tenderness.  Musculoskeletal: Normal range of motion.   Lymphadenopathy: No inguinal adenopathy present.   Neurological: She is alert and oriented to person, place, and time. Coordination normal.   Skin: Skin is warm and dry. She is not diaphoretic. No rashes, lesions or ulcers.   Psychiatric: She has a normal mood and affect, oriented to person, place, and time.              Assessment:  Vulvar lesion - reassured  Pap not due  1. Vaginal odor  Vaginosis Screen by DNA Probe       Plan:  Affirm  Reassured, vulvar lesion  Follow up for PAP as needed  Patient informed will be contacted with results within 2 weeks. Encouraged to please call back or email if she has not heard from us by then.

## 2020-03-10 ENCOUNTER — PATIENT MESSAGE (OUTPATIENT)
Dept: OBSTETRICS AND GYNECOLOGY | Facility: CLINIC | Age: 30
End: 2020-03-10

## 2020-03-10 LAB
BACTERIAL VAGINOSIS DNA: POSITIVE
CANDIDA GLABRATA DNA: NEGATIVE
CANDIDA KRUSEI DNA: NEGATIVE
CANDIDA RRNA VAG QL PROBE: NEGATIVE
T VAGINALIS RRNA GENITAL QL PROBE: NEGATIVE

## 2020-03-10 RX ORDER — METRONIDAZOLE 500 MG/1
500 TABLET ORAL 2 TIMES DAILY
Qty: 14 TABLET | Refills: 0 | Status: SHIPPED | OUTPATIENT
Start: 2020-03-10 | End: 2020-03-17

## 2020-03-31 ENCOUNTER — HOSPITAL ENCOUNTER (EMERGENCY)
Facility: HOSPITAL | Age: 30
Discharge: HOME | End: 2020-03-31
Attending: EMERGENCY MEDICINE
Payer: MEDICAID

## 2020-03-31 VITALS
RESPIRATION RATE: 18 BRPM | DIASTOLIC BLOOD PRESSURE: 58 MMHG | HEIGHT: 64 IN | OXYGEN SATURATION: 98 % | HEART RATE: 82 BPM | TEMPERATURE: 99 F | BODY MASS INDEX: 34.15 KG/M2 | WEIGHT: 200 LBS | SYSTOLIC BLOOD PRESSURE: 103 MMHG

## 2020-03-31 DIAGNOSIS — N83.201 OVARIAN CYST, RIGHT: Primary | ICD-10-CM

## 2020-03-31 LAB
ALBUMIN SERPL BCP-MCNC: 4 G/DL (ref 3.5–5.2)
ALP SERPL-CCNC: 100 U/L (ref 55–135)
ALT SERPL W/O P-5'-P-CCNC: 15 U/L (ref 10–44)
ANION GAP SERPL CALC-SCNC: 8 MMOL/L (ref 8–16)
AST SERPL-CCNC: 11 U/L (ref 10–40)
B-HCG UR QL: NEGATIVE
BASOPHILS # BLD AUTO: 0.02 K/UL (ref 0–0.2)
BASOPHILS NFR BLD: 0.3 % (ref 0–1.9)
BILIRUB SERPL-MCNC: 0.6 MG/DL (ref 0.1–1)
BILIRUB UR QL STRIP: NEGATIVE
BUN SERPL-MCNC: 8 MG/DL (ref 6–20)
CALCIUM SERPL-MCNC: 9.3 MG/DL (ref 8.7–10.5)
CHLORIDE SERPL-SCNC: 106 MMOL/L (ref 95–110)
CLARITY UR: CLEAR
CO2 SERPL-SCNC: 25 MMOL/L (ref 23–29)
COLOR UR: YELLOW
CREAT SERPL-MCNC: 0.7 MG/DL (ref 0.5–1.4)
CTP QC/QA: YES
DIFFERENTIAL METHOD: ABNORMAL
EOSINOPHIL # BLD AUTO: 0.4 K/UL (ref 0–0.5)
EOSINOPHIL NFR BLD: 5.9 % (ref 0–8)
ERYTHROCYTE [DISTWIDTH] IN BLOOD BY AUTOMATED COUNT: 13.5 % (ref 11.5–14.5)
EST. GFR  (AFRICAN AMERICAN): >60 ML/MIN/1.73 M^2
EST. GFR  (NON AFRICAN AMERICAN): >60 ML/MIN/1.73 M^2
GLUCOSE SERPL-MCNC: 103 MG/DL (ref 70–110)
GLUCOSE UR QL STRIP: NEGATIVE
HCT VFR BLD AUTO: 37 % (ref 37–48.5)
HGB BLD-MCNC: 12.6 G/DL (ref 12–16)
HGB UR QL STRIP: NEGATIVE
IMM GRANULOCYTES # BLD AUTO: 0.01 K/UL (ref 0–0.04)
IMM GRANULOCYTES NFR BLD AUTO: 0.2 % (ref 0–0.5)
KETONES UR QL STRIP: NEGATIVE
LEUKOCYTE ESTERASE UR QL STRIP: NEGATIVE
LIPASE SERPL-CCNC: 30 U/L (ref 4–60)
LYMPHOCYTES # BLD AUTO: 1.6 K/UL (ref 1–4.8)
LYMPHOCYTES NFR BLD: 25.5 % (ref 18–48)
MCH RBC QN AUTO: 31.3 PG (ref 27–31)
MCHC RBC AUTO-ENTMCNC: 34.1 G/DL (ref 32–36)
MCV RBC AUTO: 92 FL (ref 82–98)
MONOCYTES # BLD AUTO: 0.5 K/UL (ref 0.3–1)
MONOCYTES NFR BLD: 8.4 % (ref 4–15)
NEUTROPHILS # BLD AUTO: 3.8 K/UL (ref 1.8–7.7)
NEUTROPHILS NFR BLD: 59.7 % (ref 38–73)
NITRITE UR QL STRIP: NEGATIVE
NRBC BLD-RTO: 0 /100 WBC
PH UR STRIP: 7 [PH] (ref 5–8)
PLATELET # BLD AUTO: 191 K/UL (ref 150–350)
PMV BLD AUTO: 9.5 FL (ref 9.2–12.9)
POTASSIUM SERPL-SCNC: 4.4 MMOL/L (ref 3.5–5.1)
PROT SERPL-MCNC: 7.3 G/DL (ref 6–8.4)
PROT UR QL STRIP: NEGATIVE
RBC # BLD AUTO: 4.02 M/UL (ref 4–5.4)
SODIUM SERPL-SCNC: 139 MMOL/L (ref 136–145)
SP GR UR STRIP: 1.02 (ref 1–1.03)
URN SPEC COLLECT METH UR: NORMAL
UROBILINOGEN UR STRIP-ACNC: 1 EU/DL
WBC # BLD AUTO: 6.31 K/UL (ref 3.9–12.7)

## 2020-03-31 PROCEDURE — 83690 ASSAY OF LIPASE: CPT

## 2020-03-31 PROCEDURE — 25500020 PHARM REV CODE 255: Performed by: EMERGENCY MEDICINE

## 2020-03-31 PROCEDURE — 99285 EMERGENCY DEPT VISIT HI MDM: CPT | Mod: 25

## 2020-03-31 PROCEDURE — 81003 URINALYSIS AUTO W/O SCOPE: CPT

## 2020-03-31 PROCEDURE — 81025 URINE PREGNANCY TEST: CPT | Performed by: EMERGENCY MEDICINE

## 2020-03-31 PROCEDURE — 96375 TX/PRO/DX INJ NEW DRUG ADDON: CPT

## 2020-03-31 PROCEDURE — 80053 COMPREHEN METABOLIC PANEL: CPT

## 2020-03-31 PROCEDURE — 96374 THER/PROPH/DIAG INJ IV PUSH: CPT

## 2020-03-31 PROCEDURE — 85025 COMPLETE CBC W/AUTO DIFF WBC: CPT

## 2020-03-31 PROCEDURE — 36415 COLL VENOUS BLD VENIPUNCTURE: CPT

## 2020-03-31 PROCEDURE — 63600175 PHARM REV CODE 636 W HCPCS: Performed by: EMERGENCY MEDICINE

## 2020-03-31 RX ORDER — KETOROLAC TROMETHAMINE 30 MG/ML
10 INJECTION, SOLUTION INTRAMUSCULAR; INTRAVENOUS
Status: COMPLETED | OUTPATIENT
Start: 2020-03-31 | End: 2020-03-31

## 2020-03-31 RX ORDER — IBUPROFEN 600 MG/1
600 TABLET ORAL EVERY 6 HOURS PRN
Qty: 20 TABLET | Refills: 0 | OUTPATIENT
Start: 2020-03-31 | End: 2021-09-02

## 2020-03-31 RX ORDER — MORPHINE SULFATE 4 MG/ML
4 INJECTION, SOLUTION INTRAMUSCULAR; INTRAVENOUS
Status: COMPLETED | OUTPATIENT
Start: 2020-03-31 | End: 2020-03-31

## 2020-03-31 RX ADMIN — MORPHINE SULFATE 4 MG: 4 INJECTION, SOLUTION INTRAMUSCULAR; INTRAVENOUS at 04:03

## 2020-03-31 RX ADMIN — KETOROLAC TROMETHAMINE 10 MG: 30 INJECTION, SOLUTION INTRAMUSCULAR at 06:03

## 2020-03-31 RX ADMIN — IOHEXOL 100 ML: 350 INJECTION, SOLUTION INTRAVENOUS at 04:03
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
## 2020-04-01 ENCOUNTER — PATIENT MESSAGE (OUTPATIENT)
Dept: OBSTETRICS AND GYNECOLOGY | Facility: CLINIC | Age: 30
End: 2020-04-01

## 2020-04-01 NOTE — ED NOTES
Rosa Charles presents to the ED with c/o RLQ pain that radiates to right lower back. Patient reports that her pain started yesterday and progressively worsened today. Patient denies any fever at home and is afebrile upon arrival to the ED. LMP was 3/6/2020. Denies any vaginal bleeding or discharge. Mucous membranes are pink and moist. Skin is warm, dry and intact. Lungs are clear bilaterally, respirations are regular and unlabored. Denies SOB, cough, congestion or rhinorrhea. BS active x4, tenderness to RLQ  with palpation, abd is soft and not distended. Denies any appetite or activity change. S1S2, capillary refill is < 2 seconds. Denies dysuria, difficulty urinating, frequency, numbness, tingling or weakness. MATTHEW COKER

## 2020-04-01 NOTE — ED NOTES
Upon discharge, patient is AAOx4, no cardiac or respiratory complications. Follow up care and  Medications have been reviewed with patient and has been instructed to return to the ER if needed. Patient verbalized understanding and ambulated to the lobby without difficulty. SHEELA CASTRO.

## 2020-04-16 ENCOUNTER — PATIENT MESSAGE (OUTPATIENT)
Dept: OBSTETRICS AND GYNECOLOGY | Facility: CLINIC | Age: 30
End: 2020-04-16

## 2020-04-16 RX ORDER — METRONIDAZOLE 7.5 MG/G
1 GEL VAGINAL NIGHTLY
Qty: 70 G | Refills: 3 | Status: SHIPPED | OUTPATIENT
Start: 2020-04-16 | End: 2020-04-21

## 2020-06-24 ENCOUNTER — HOSPITAL ENCOUNTER (EMERGENCY)
Facility: HOSPITAL | Age: 30
Discharge: HOME | End: 2020-06-24
Attending: EMERGENCY MEDICINE
Payer: MEDICAID

## 2020-06-24 VITALS
HEIGHT: 64 IN | DIASTOLIC BLOOD PRESSURE: 70 MMHG | TEMPERATURE: 99 F | RESPIRATION RATE: 20 BRPM | HEART RATE: 90 BPM | BODY MASS INDEX: 33.63 KG/M2 | OXYGEN SATURATION: 100 % | SYSTOLIC BLOOD PRESSURE: 106 MMHG | WEIGHT: 197 LBS

## 2020-06-24 DIAGNOSIS — R51.9 CHRONIC NONINTRACTABLE HEADACHE, UNSPECIFIED HEADACHE TYPE: Primary | ICD-10-CM

## 2020-06-24 DIAGNOSIS — G89.29 CHRONIC NONINTRACTABLE HEADACHE, UNSPECIFIED HEADACHE TYPE: Primary | ICD-10-CM

## 2020-06-24 LAB
B-HCG UR QL: NEGATIVE
CTP QC/QA: YES

## 2020-06-24 PROCEDURE — 99284 EMERGENCY DEPT VISIT MOD MDM: CPT | Mod: 25

## 2020-06-24 PROCEDURE — 25000003 PHARM REV CODE 250: Performed by: EMERGENCY MEDICINE

## 2020-06-24 PROCEDURE — 96374 THER/PROPH/DIAG INJ IV PUSH: CPT

## 2020-06-24 PROCEDURE — 63600175 PHARM REV CODE 636 W HCPCS: Performed by: EMERGENCY MEDICINE

## 2020-06-24 PROCEDURE — 96361 HYDRATE IV INFUSION ADD-ON: CPT

## 2020-06-24 PROCEDURE — 81025 URINE PREGNANCY TEST: CPT | Performed by: EMERGENCY MEDICINE

## 2020-06-24 RX ORDER — BUTALBITAL, ACETAMINOPHEN AND CAFFEINE 50; 325; 40 MG/1; MG/1; MG/1
1 TABLET ORAL EVERY 6 HOURS PRN
Qty: 10 TABLET | Refills: 0 | Status: SHIPPED | OUTPATIENT
Start: 2020-06-24 | End: 2020-07-04

## 2020-06-24 RX ORDER — DIPHENHYDRAMINE HYDROCHLORIDE 50 MG/ML
25 INJECTION INTRAMUSCULAR; INTRAVENOUS
Status: DISCONTINUED | OUTPATIENT
Start: 2020-06-24 | End: 2020-06-24 | Stop reason: HOSPADM

## 2020-06-24 RX ORDER — DEXAMETHASONE SODIUM PHOSPHATE 4 MG/ML
8 INJECTION, SOLUTION INTRA-ARTICULAR; INTRALESIONAL; INTRAMUSCULAR; INTRAVENOUS; SOFT TISSUE ONCE
Status: COMPLETED | OUTPATIENT
Start: 2020-06-24 | End: 2020-06-24

## 2020-06-24 RX ORDER — ARIPIPRAZOLE 5 MG/1
5 TABLET ORAL DAILY
COMMUNITY
End: 2021-12-09 | Stop reason: CLARIF

## 2020-06-24 RX ORDER — ALBUTEROL SULFATE 90 UG/1
2 AEROSOL, METERED RESPIRATORY (INHALATION) EVERY 6 HOURS PRN
COMMUNITY
End: 2021-12-09 | Stop reason: CLARIF

## 2020-06-24 RX ORDER — PROCHLORPERAZINE EDISYLATE 5 MG/ML
10 INJECTION INTRAMUSCULAR; INTRAVENOUS
Status: DISCONTINUED | OUTPATIENT
Start: 2020-06-24 | End: 2020-06-24 | Stop reason: HOSPADM

## 2020-06-24 RX ORDER — DEXTROAMPHETAMINE SACCHARATE, AMPHETAMINE ASPARTATE MONOHYDRATE, DEXTROAMPHETAMINE SULFATE AND AMPHETAMINE SULFATE 5; 5; 5; 5 MG/1; MG/1; MG/1; MG/1
20 CAPSULE, EXTENDED RELEASE ORAL EVERY MORNING
COMMUNITY
End: 2021-12-09 | Stop reason: CLARIF

## 2020-06-24 RX ADMIN — DEXAMETHASONE SODIUM PHOSPHATE 8 MG: 4 INJECTION, SOLUTION INTRA-ARTICULAR; INTRALESIONAL; INTRAMUSCULAR; INTRAVENOUS; SOFT TISSUE at 08:06

## 2020-06-24 RX ADMIN — SODIUM CHLORIDE 1000 ML: 0.9 INJECTION, SOLUTION INTRAVENOUS at 08:06
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
## 2020-06-24 NOTE — Clinical Note
Rosa Charles was seen and treated in our emergency department on 6/24/2020.  She may return to work on 06/26/2020.       If you have any questions or concerns, please don't hesitate to call.      Lisset Flores RN RN

## 2020-06-24 NOTE — Clinical Note
Rosa Charles was seen and treated in our emergency department on 6/24/2020.  She may return to work on 06/25/2020.       If you have any questions or concerns, please don't hesitate to call.      Lisset Flores RN RN

## 2020-07-14 ENCOUNTER — OFFICE VISIT (OUTPATIENT)
Dept: OBSTETRICS AND GYNECOLOGY | Facility: CLINIC | Age: 30
End: 2020-07-14
Payer: MEDICAID

## 2020-07-14 VITALS
BODY MASS INDEX: 36.96 KG/M2 | HEIGHT: 64 IN | SYSTOLIC BLOOD PRESSURE: 130 MMHG | DIASTOLIC BLOOD PRESSURE: 80 MMHG | WEIGHT: 216.5 LBS

## 2020-07-14 DIAGNOSIS — Z12.4 PAP SMEAR FOR CERVICAL CANCER SCREENING: Primary | ICD-10-CM

## 2020-07-14 DIAGNOSIS — N94.6 DYSMENORRHEA: ICD-10-CM

## 2020-07-14 PROCEDURE — 99214 OFFICE O/P EST MOD 30 MIN: CPT | Mod: PBBFAC,PN | Performed by: OBSTETRICS & GYNECOLOGY

## 2020-07-14 PROCEDURE — 99395 PR PREVENTIVE VISIT,EST,18-39: ICD-10-PCS | Mod: S$PBB,,, | Performed by: OBSTETRICS & GYNECOLOGY

## 2020-07-14 PROCEDURE — 99999 PR PBB SHADOW E&M-EST. PATIENT-LVL IV: ICD-10-PCS | Mod: PBBFAC,,, | Performed by: OBSTETRICS & GYNECOLOGY

## 2020-07-14 PROCEDURE — 99395 PREV VISIT EST AGE 18-39: CPT | Mod: S$PBB,,, | Performed by: OBSTETRICS & GYNECOLOGY

## 2020-07-14 PROCEDURE — 88175 CYTOPATH C/V AUTO FLUID REDO: CPT

## 2020-07-14 PROCEDURE — 99999 PR PBB SHADOW E&M-EST. PATIENT-LVL IV: CPT | Mod: PBBFAC,,, | Performed by: OBSTETRICS & GYNECOLOGY

## 2020-07-14 RX ORDER — DROSPIRENONE AND ETHINYL ESTRADIOL 0.02-3(28)
1 KIT ORAL DAILY
Qty: 28 TABLET | Refills: 11 | Status: SHIPPED | OUTPATIENT
Start: 2020-07-14 | End: 2021-12-09 | Stop reason: CLARIF
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
## 2020-07-16 ENCOUNTER — OFFICE VISIT (OUTPATIENT)
Dept: OBSTETRICS AND GYNECOLOGY | Facility: CLINIC | Age: 30
End: 2020-07-16
Payer: MEDICAID

## 2020-07-16 VITALS
HEIGHT: 64 IN | DIASTOLIC BLOOD PRESSURE: 64 MMHG | SYSTOLIC BLOOD PRESSURE: 110 MMHG | BODY MASS INDEX: 36.96 KG/M2 | WEIGHT: 216.5 LBS

## 2020-07-16 DIAGNOSIS — N90.89 LESION OF VULVA: Primary | ICD-10-CM

## 2020-07-16 PROCEDURE — 99499 UNLISTED E&M SERVICE: CPT | Mod: S$PBB,,, | Performed by: OBSTETRICS & GYNECOLOGY

## 2020-07-16 PROCEDURE — 56501 DESTROY VULVA LESIONS SIM: CPT | Mod: PBBFAC,PN | Performed by: OBSTETRICS & GYNECOLOGY

## 2020-07-16 PROCEDURE — 99499 NO LOS: ICD-10-PCS | Mod: S$PBB,,, | Performed by: OBSTETRICS & GYNECOLOGY

## 2020-07-16 PROCEDURE — 56501 PR DESTRUCTION,LESION(S),VULVA,SIMPLE: ICD-10-PCS | Mod: S$PBB,,, | Performed by: OBSTETRICS & GYNECOLOGY

## 2020-07-16 PROCEDURE — 56501 DESTROY VULVA LESIONS SIM: CPT | Mod: S$PBB,,, | Performed by: OBSTETRICS & GYNECOLOGY

## 2020-07-16 PROCEDURE — 99999 PR PBB SHADOW E&M-EST. PATIENT-LVL II: CPT | Mod: PBBFAC,,, | Performed by: OBSTETRICS & GYNECOLOGY

## 2020-07-16 PROCEDURE — 99999 PR PBB SHADOW E&M-EST. PATIENT-LVL II: ICD-10-PCS | Mod: PBBFAC,,, | Performed by: OBSTETRICS & GYNECOLOGY

## 2020-07-16 PROCEDURE — 99212 OFFICE O/P EST SF 10 MIN: CPT | Mod: PBBFAC,PN | Performed by: OBSTETRICS & GYNECOLOGY

## 2020-07-16 NOTE — PROGRESS NOTES
Vulvar hemangioma - request removal    VULVAR biopsy:  7/16/2020   Patient was prepped and draped in the usual fashion after verbal consent was obtained.    SURGEON: Deepak Diehl M.D., F.A.C.O.G.    PREOPERATIVE DIAGNOSIS:   Patient Active Problem List   Diagnosis    Anxiety    Depression with suicidal ideation    Sore throat    Tonsillitis       POSTOPERATIVE DIAGNOSIS:   Same as above    ANESTHESIA: none    ESTIMATED BLOOD LOSS: none    Specimen: none    Complications: no    FINDINGS:    Vulva with 2-3mm hemangioma on the right x 5    PROCEDURE IN DETAIL:    Abnormal area on right vulva was cleaned with Betadine, and cryoprobe used to ablate lesions - freeze x 1min - thaw x 1 min - freeze x 1 min. Procedure tolerated well.     Local care next 7-10 days    Encouraged to please call back or email if she has not heard from us by then.      Deepak Diehl M.D., FACOG

## 2020-07-24 ENCOUNTER — PATIENT MESSAGE (OUTPATIENT)
Dept: OBSTETRICS AND GYNECOLOGY | Facility: CLINIC | Age: 30
End: 2020-07-24

## 2020-07-24 LAB
FINAL PATHOLOGIC DIAGNOSIS: NORMAL
Lab: NORMAL

## 2020-09-23 LAB
B-HCG UR QL: NEGATIVE
CTP QC/QA: YES

## 2020-09-23 PROCEDURE — 99283 EMERGENCY DEPT VISIT LOW MDM: CPT

## 2020-09-23 PROCEDURE — 81025 URINE PREGNANCY TEST: CPT | Performed by: EMERGENCY MEDICINE

## 2020-09-24 ENCOUNTER — HOSPITAL ENCOUNTER (EMERGENCY)
Facility: HOSPITAL | Age: 30
Discharge: HOME | End: 2020-09-24
Attending: EMERGENCY MEDICINE
Payer: MEDICAID

## 2020-09-24 VITALS
HEART RATE: 82 BPM | WEIGHT: 200 LBS | OXYGEN SATURATION: 99 % | DIASTOLIC BLOOD PRESSURE: 87 MMHG | SYSTOLIC BLOOD PRESSURE: 120 MMHG | BODY MASS INDEX: 34.33 KG/M2 | TEMPERATURE: 98 F | RESPIRATION RATE: 16 BRPM

## 2020-09-24 DIAGNOSIS — N39.0 URINARY TRACT INFECTION WITHOUT HEMATURIA, SITE UNSPECIFIED: Primary | ICD-10-CM

## 2020-09-24 LAB
BACTERIA #/AREA URNS HPF: ABNORMAL /HPF
BILIRUB UR QL STRIP: NEGATIVE
CLARITY UR: ABNORMAL
COLOR UR: YELLOW
GLUCOSE UR QL STRIP: NEGATIVE
HGB UR QL STRIP: NEGATIVE
HYALINE CASTS #/AREA URNS LPF: 13 /LPF
KETONES UR QL STRIP: NEGATIVE
LEUKOCYTE ESTERASE UR QL STRIP: ABNORMAL
MICROSCOPIC COMMENT: ABNORMAL
NITRITE UR QL STRIP: NEGATIVE
PH UR STRIP: 6 [PH] (ref 5–8)
PROT UR QL STRIP: ABNORMAL
RBC #/AREA URNS HPF: 2 /HPF (ref 0–4)
SP GR UR STRIP: >1.03 (ref 1–1.03)
SQUAMOUS #/AREA URNS HPF: 1 /HPF
URN SPEC COLLECT METH UR: ABNORMAL
UROBILINOGEN UR STRIP-ACNC: NEGATIVE EU/DL
WBC #/AREA URNS HPF: >100 /HPF (ref 0–5)

## 2020-09-24 PROCEDURE — 81001 URINALYSIS AUTO W/SCOPE: CPT

## 2020-09-24 PROCEDURE — 25000003 PHARM REV CODE 250: Performed by: EMERGENCY MEDICINE

## 2020-09-24 RX ORDER — CIPROFLOXACIN 500 MG/1
500 TABLET ORAL 2 TIMES DAILY
Qty: 20 TABLET | Refills: 0 | Status: SHIPPED | OUTPATIENT
Start: 2020-09-24 | End: 2020-10-04

## 2020-09-24 RX ORDER — CIPROFLOXACIN 500 MG/1
500 TABLET ORAL
Status: COMPLETED | OUTPATIENT
Start: 2020-09-24 | End: 2020-09-24

## 2020-09-24 RX ADMIN — CIPROFLOXACIN HYDROCHLORIDE 500 MG: 500 TABLET, FILM COATED ORAL at 03:09
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
## 2020-09-24 NOTE — Clinical Note
"Rosa Stephens" Melvin was seen and treated in our emergency department on 9/23/2020.  She may return to work on 09/24/2020.       If you have any questions or concerns, please don't hesitate to call.      Pete SMITH    "

## 2021-02-22 ENCOUNTER — HOSPITAL ENCOUNTER (EMERGENCY)
Facility: HOSPITAL | Age: 31
Discharge: HOME | End: 2021-02-22
Attending: EMERGENCY MEDICINE
Payer: MEDICAID

## 2021-02-22 VITALS
DIASTOLIC BLOOD PRESSURE: 82 MMHG | RESPIRATION RATE: 16 BRPM | WEIGHT: 198 LBS | HEIGHT: 64 IN | SYSTOLIC BLOOD PRESSURE: 121 MMHG | HEART RATE: 86 BPM | TEMPERATURE: 98 F | OXYGEN SATURATION: 100 % | BODY MASS INDEX: 33.8 KG/M2

## 2021-02-22 DIAGNOSIS — R10.13 EPIGASTRIC PAIN: Primary | ICD-10-CM

## 2021-02-22 LAB
ALBUMIN SERPL BCP-MCNC: 4.4 G/DL (ref 3.5–5.2)
ALP SERPL-CCNC: 63 U/L (ref 55–135)
ALT SERPL W/O P-5'-P-CCNC: 19 U/L (ref 10–44)
AMYLASE SERPL-CCNC: 28 U/L (ref 20–110)
ANION GAP SERPL CALC-SCNC: 6 MMOL/L (ref 8–16)
AST SERPL-CCNC: 15 U/L (ref 10–40)
B-HCG UR QL: NEGATIVE
BACTERIA #/AREA URNS HPF: ABNORMAL /HPF
BASOPHILS # BLD AUTO: 0.02 K/UL (ref 0–0.2)
BASOPHILS NFR BLD: 0.4 % (ref 0–1.9)
BILIRUB SERPL-MCNC: 1 MG/DL (ref 0.1–1)
BILIRUB UR QL STRIP: NEGATIVE
BUN SERPL-MCNC: 10 MG/DL (ref 6–20)
CALCIUM SERPL-MCNC: 8.8 MG/DL (ref 8.7–10.5)
CHLORIDE SERPL-SCNC: 107 MMOL/L (ref 95–110)
CLARITY UR: ABNORMAL
CO2 SERPL-SCNC: 24 MMOL/L (ref 23–29)
COLOR UR: YELLOW
CREAT SERPL-MCNC: 0.7 MG/DL (ref 0.5–1.4)
CTP QC/QA: YES
DIFFERENTIAL METHOD: ABNORMAL
EOSINOPHIL # BLD AUTO: 0.1 K/UL (ref 0–0.5)
EOSINOPHIL NFR BLD: 2.4 % (ref 0–8)
ERYTHROCYTE [DISTWIDTH] IN BLOOD BY AUTOMATED COUNT: 13.3 % (ref 11.5–14.5)
EST. GFR  (AFRICAN AMERICAN): >60 ML/MIN/1.73 M^2
EST. GFR  (NON AFRICAN AMERICAN): >60 ML/MIN/1.73 M^2
GLUCOSE SERPL-MCNC: 120 MG/DL (ref 70–110)
GLUCOSE UR QL STRIP: NEGATIVE
HCT VFR BLD AUTO: 33.8 % (ref 37–48.5)
HGB BLD-MCNC: 11.7 G/DL (ref 12–16)
HGB UR QL STRIP: NEGATIVE
HYALINE CASTS #/AREA URNS LPF: 44 /LPF
IMM GRANULOCYTES # BLD AUTO: 0 K/UL (ref 0–0.04)
IMM GRANULOCYTES NFR BLD AUTO: 0 % (ref 0–0.5)
KETONES UR QL STRIP: NEGATIVE
LEUKOCYTE ESTERASE UR QL STRIP: ABNORMAL
LIPASE SERPL-CCNC: 27 U/L (ref 4–60)
LYMPHOCYTES # BLD AUTO: 1.3 K/UL (ref 1–4.8)
LYMPHOCYTES NFR BLD: 28.2 % (ref 18–48)
MCH RBC QN AUTO: 31.1 PG (ref 27–31)
MCHC RBC AUTO-ENTMCNC: 34.6 G/DL (ref 32–36)
MCV RBC AUTO: 90 FL (ref 82–98)
MICROSCOPIC COMMENT: ABNORMAL
MONOCYTES # BLD AUTO: 0.4 K/UL (ref 0.3–1)
MONOCYTES NFR BLD: 8.8 % (ref 4–15)
NEUTROPHILS # BLD AUTO: 2.7 K/UL (ref 1.8–7.7)
NEUTROPHILS NFR BLD: 60.2 % (ref 38–73)
NITRITE UR QL STRIP: NEGATIVE
NRBC BLD-RTO: 0 /100 WBC
PH UR STRIP: 6 [PH] (ref 5–8)
PLATELET # BLD AUTO: 199 K/UL (ref 150–350)
PMV BLD AUTO: 9.6 FL (ref 9.2–12.9)
POTASSIUM SERPL-SCNC: 3.7 MMOL/L (ref 3.5–5.1)
PROT SERPL-MCNC: 7.2 G/DL (ref 6–8.4)
PROT UR QL STRIP: ABNORMAL
RBC # BLD AUTO: 3.76 M/UL (ref 4–5.4)
RBC #/AREA URNS HPF: 5 /HPF (ref 0–4)
SODIUM SERPL-SCNC: 137 MMOL/L (ref 136–145)
SP GR UR STRIP: 1.03 (ref 1–1.03)
SQUAMOUS #/AREA URNS HPF: 25 /HPF
URN SPEC COLLECT METH UR: ABNORMAL
UROBILINOGEN UR STRIP-ACNC: ABNORMAL EU/DL
WBC # BLD AUTO: 4.54 K/UL (ref 3.9–12.7)
WBC #/AREA URNS HPF: 18 /HPF (ref 0–5)

## 2021-02-22 PROCEDURE — 82150 ASSAY OF AMYLASE: CPT

## 2021-02-22 PROCEDURE — 81001 URINALYSIS AUTO W/SCOPE: CPT

## 2021-02-22 PROCEDURE — 25000003 PHARM REV CODE 250: Performed by: STUDENT IN AN ORGANIZED HEALTH CARE EDUCATION/TRAINING PROGRAM

## 2021-02-22 PROCEDURE — 36415 COLL VENOUS BLD VENIPUNCTURE: CPT

## 2021-02-22 PROCEDURE — 99284 EMERGENCY DEPT VISIT MOD MDM: CPT | Mod: 25

## 2021-02-22 PROCEDURE — 80053 COMPREHEN METABOLIC PANEL: CPT

## 2021-02-22 PROCEDURE — 85025 COMPLETE CBC W/AUTO DIFF WBC: CPT

## 2021-02-22 PROCEDURE — 83690 ASSAY OF LIPASE: CPT

## 2021-02-22 PROCEDURE — 81025 URINE PREGNANCY TEST: CPT | Performed by: EMERGENCY MEDICINE

## 2021-02-22 RX ORDER — FAMOTIDINE 20 MG/1
20 TABLET, FILM COATED ORAL 2 TIMES DAILY
Qty: 20 TABLET | Refills: 0 | Status: SHIPPED | OUTPATIENT
Start: 2021-02-22 | End: 2021-12-09 | Stop reason: CLARIF

## 2021-02-22 RX ADMIN — DICYCLOMINE HYDROCHLORIDE 50 ML: 10 SOLUTION ORAL at 10:02

## 2021-04-21 ENCOUNTER — PATIENT MESSAGE (OUTPATIENT)
Dept: OBSTETRICS AND GYNECOLOGY | Facility: CLINIC | Age: 31
End: 2021-04-21

## 2021-04-21 ENCOUNTER — OFFICE VISIT (OUTPATIENT)
Dept: OBSTETRICS AND GYNECOLOGY | Facility: CLINIC | Age: 31
End: 2021-04-21
Payer: MEDICAID

## 2021-04-21 ENCOUNTER — TELEPHONE (OUTPATIENT)
Dept: OBSTETRICS AND GYNECOLOGY | Facility: CLINIC | Age: 31
End: 2021-04-21

## 2021-04-21 VITALS
WEIGHT: 184.5 LBS | RESPIRATION RATE: 16 BRPM | HEIGHT: 64 IN | DIASTOLIC BLOOD PRESSURE: 70 MMHG | BODY MASS INDEX: 31.5 KG/M2 | SYSTOLIC BLOOD PRESSURE: 118 MMHG

## 2021-04-21 DIAGNOSIS — N64.4 BREAST PAIN IN FEMALE: Primary | ICD-10-CM

## 2021-04-21 PROCEDURE — 99213 OFFICE O/P EST LOW 20 MIN: CPT | Mod: S$PBB,,, | Performed by: OBSTETRICS & GYNECOLOGY

## 2021-04-21 PROCEDURE — 99999 PR PBB SHADOW E&M-EST. PATIENT-LVL IV: ICD-10-PCS | Mod: PBBFAC,,, | Performed by: OBSTETRICS & GYNECOLOGY

## 2021-04-21 PROCEDURE — 99999 PR PBB SHADOW E&M-EST. PATIENT-LVL IV: CPT | Mod: PBBFAC,,, | Performed by: OBSTETRICS & GYNECOLOGY

## 2021-04-21 PROCEDURE — 99214 OFFICE O/P EST MOD 30 MIN: CPT | Mod: PBBFAC,PN | Performed by: OBSTETRICS & GYNECOLOGY

## 2021-04-21 PROCEDURE — 99213 PR OFFICE/OUTPT VISIT, EST, LEVL III, 20-29 MIN: ICD-10-PCS | Mod: S$PBB,,, | Performed by: OBSTETRICS & GYNECOLOGY

## 2021-06-11 ENCOUNTER — PATIENT MESSAGE (OUTPATIENT)
Dept: OBSTETRICS AND GYNECOLOGY | Facility: CLINIC | Age: 31
End: 2021-06-11

## 2021-09-02 ENCOUNTER — HOSPITAL ENCOUNTER (EMERGENCY)
Facility: HOSPITAL | Age: 31
Discharge: HOME | End: 2021-09-02
Attending: EMERGENCY MEDICINE
Payer: MEDICAID

## 2021-09-02 VITALS
TEMPERATURE: 98 F | DIASTOLIC BLOOD PRESSURE: 81 MMHG | SYSTOLIC BLOOD PRESSURE: 117 MMHG | BODY MASS INDEX: 27.66 KG/M2 | RESPIRATION RATE: 20 BRPM | HEIGHT: 64 IN | OXYGEN SATURATION: 100 % | HEART RATE: 85 BPM | WEIGHT: 162 LBS

## 2021-09-02 DIAGNOSIS — R52 PAIN: ICD-10-CM

## 2021-09-02 DIAGNOSIS — M25.531 WRIST PAIN, ACUTE, RIGHT: Primary | ICD-10-CM

## 2021-09-02 PROCEDURE — 99283 EMERGENCY DEPT VISIT LOW MDM: CPT

## 2021-09-02 PROCEDURE — 25000003 PHARM REV CODE 250: Performed by: NURSE PRACTITIONER

## 2021-09-02 RX ORDER — IBUPROFEN 600 MG/1
600 TABLET ORAL EVERY 6 HOURS PRN
Qty: 20 TABLET | Refills: 0 | Status: SHIPPED | OUTPATIENT
Start: 2021-09-02 | End: 2021-09-07

## 2021-09-02 RX ADMIN — IBUPROFEN 600 MG: 200 TABLET, FILM COATED ORAL at 04:09

## 2021-10-03 NOTE — TELEPHONE ENCOUNTER
----- Message from Ushasamia Haddad sent at 11/14/2017 12:55 PM CST -----  Patient states that she need a medication for a rash on neck and face.  Please send into [facility].  Any questions call [phone].   Attending Attestation (For Attendings USE Only)...

## 2021-11-16 ENCOUNTER — OFFICE VISIT (OUTPATIENT)
Dept: OBSTETRICS AND GYNECOLOGY | Facility: CLINIC | Age: 31
End: 2021-11-16
Payer: MEDICAID

## 2021-11-16 ENCOUNTER — LAB VISIT (OUTPATIENT)
Dept: LAB | Facility: HOSPITAL | Age: 31
End: 2021-11-16
Attending: STUDENT IN AN ORGANIZED HEALTH CARE EDUCATION/TRAINING PROGRAM
Payer: MEDICAID

## 2021-11-16 VITALS
SYSTOLIC BLOOD PRESSURE: 122 MMHG | DIASTOLIC BLOOD PRESSURE: 68 MMHG | BODY MASS INDEX: 28.24 KG/M2 | WEIGHT: 165.38 LBS | HEIGHT: 64 IN | RESPIRATION RATE: 16 BRPM

## 2021-11-16 DIAGNOSIS — N93.9 ABNORMAL UTERINE BLEEDING: Primary | ICD-10-CM

## 2021-11-16 DIAGNOSIS — N93.9 ABNORMAL UTERINE BLEEDING: ICD-10-CM

## 2021-11-16 DIAGNOSIS — N93.0 PCB (POST COITAL BLEEDING): ICD-10-CM

## 2021-11-16 LAB
BASOPHILS # BLD AUTO: 0.02 K/UL (ref 0–0.2)
BASOPHILS NFR BLD: 0.6 % (ref 0–1.9)
DIFFERENTIAL METHOD: ABNORMAL
EOSINOPHIL # BLD AUTO: 0.1 K/UL (ref 0–0.5)
EOSINOPHIL NFR BLD: 2.8 % (ref 0–8)
ERYTHROCYTE [DISTWIDTH] IN BLOOD BY AUTOMATED COUNT: 13.3 % (ref 11.5–14.5)
HCT VFR BLD AUTO: 37.2 % (ref 37–48.5)
HGB BLD-MCNC: 12.6 G/DL (ref 12–16)
IMM GRANULOCYTES # BLD AUTO: 0.01 K/UL (ref 0–0.04)
IMM GRANULOCYTES NFR BLD AUTO: 0.3 % (ref 0–0.5)
LYMPHOCYTES # BLD AUTO: 0.9 K/UL (ref 1–4.8)
LYMPHOCYTES NFR BLD: 26.6 % (ref 18–48)
MCH RBC QN AUTO: 31.7 PG (ref 27–31)
MCHC RBC AUTO-ENTMCNC: 33.9 G/DL (ref 32–36)
MCV RBC AUTO: 94 FL (ref 82–98)
MONOCYTES # BLD AUTO: 0.4 K/UL (ref 0.3–1)
MONOCYTES NFR BLD: 11.3 % (ref 4–15)
NEUTROPHILS # BLD AUTO: 2.1 K/UL (ref 1.8–7.7)
NEUTROPHILS NFR BLD: 58.4 % (ref 38–73)
NRBC BLD-RTO: 0 /100 WBC
PLATELET # BLD AUTO: 215 K/UL (ref 150–450)
PMV BLD AUTO: 9.9 FL (ref 9.2–12.9)
RBC # BLD AUTO: 3.98 M/UL (ref 4–5.4)
TSH SERPL DL<=0.005 MIU/L-ACNC: 0.48 UIU/ML (ref 0.4–4)
WBC # BLD AUTO: 3.54 K/UL (ref 3.9–12.7)

## 2021-11-16 PROCEDURE — 84443 ASSAY THYROID STIM HORMONE: CPT | Performed by: STUDENT IN AN ORGANIZED HEALTH CARE EDUCATION/TRAINING PROGRAM

## 2021-11-16 PROCEDURE — 99999 PR PBB SHADOW E&M-EST. PATIENT-LVL IV: ICD-10-PCS | Mod: PBBFAC,,, | Performed by: STUDENT IN AN ORGANIZED HEALTH CARE EDUCATION/TRAINING PROGRAM

## 2021-11-16 PROCEDURE — 99999 PR PBB SHADOW E&M-EST. PATIENT-LVL IV: CPT | Mod: PBBFAC,,, | Performed by: STUDENT IN AN ORGANIZED HEALTH CARE EDUCATION/TRAINING PROGRAM

## 2021-11-16 PROCEDURE — 85025 COMPLETE CBC W/AUTO DIFF WBC: CPT | Performed by: STUDENT IN AN ORGANIZED HEALTH CARE EDUCATION/TRAINING PROGRAM

## 2021-11-16 PROCEDURE — 99214 OFFICE O/P EST MOD 30 MIN: CPT | Mod: PBBFAC,PN | Performed by: STUDENT IN AN ORGANIZED HEALTH CARE EDUCATION/TRAINING PROGRAM

## 2021-11-16 PROCEDURE — 99214 OFFICE O/P EST MOD 30 MIN: CPT | Mod: S$PBB,,, | Performed by: STUDENT IN AN ORGANIZED HEALTH CARE EDUCATION/TRAINING PROGRAM

## 2021-11-16 PROCEDURE — 87591 N.GONORRHOEAE DNA AMP PROB: CPT | Performed by: STUDENT IN AN ORGANIZED HEALTH CARE EDUCATION/TRAINING PROGRAM

## 2021-11-16 PROCEDURE — 99214 PR OFFICE/OUTPT VISIT, EST, LEVL IV, 30-39 MIN: ICD-10-PCS | Mod: S$PBB,,, | Performed by: STUDENT IN AN ORGANIZED HEALTH CARE EDUCATION/TRAINING PROGRAM

## 2021-11-16 PROCEDURE — 87491 CHLMYD TRACH DNA AMP PROBE: CPT | Performed by: STUDENT IN AN ORGANIZED HEALTH CARE EDUCATION/TRAINING PROGRAM

## 2021-11-16 PROCEDURE — 36415 COLL VENOUS BLD VENIPUNCTURE: CPT | Mod: PN | Performed by: STUDENT IN AN ORGANIZED HEALTH CARE EDUCATION/TRAINING PROGRAM

## 2021-11-16 RX ORDER — NORETHINDRONE 0.35 MG/1
1 TABLET ORAL DAILY
Qty: 30 TABLET | Refills: 11 | Status: SHIPPED | OUTPATIENT
Start: 2021-11-16 | End: 2022-04-15 | Stop reason: ALTCHOICE

## 2021-11-19 ENCOUNTER — PATIENT MESSAGE (OUTPATIENT)
Dept: OBSTETRICS AND GYNECOLOGY | Facility: CLINIC | Age: 31
End: 2021-11-19
Payer: MEDICAID

## 2021-11-19 LAB
C TRACH DNA SPEC QL NAA+PROBE: NOT DETECTED
N GONORRHOEA DNA SPEC QL NAA+PROBE: NOT DETECTED

## 2021-11-20 ENCOUNTER — HOSPITAL ENCOUNTER (EMERGENCY)
Facility: HOSPITAL | Age: 31
Discharge: HOME | End: 2021-11-20
Payer: MEDICAID

## 2021-11-20 VITALS
TEMPERATURE: 98 F | SYSTOLIC BLOOD PRESSURE: 113 MMHG | HEART RATE: 82 BPM | BODY MASS INDEX: 27.31 KG/M2 | DIASTOLIC BLOOD PRESSURE: 96 MMHG | WEIGHT: 160 LBS | HEIGHT: 64 IN | RESPIRATION RATE: 19 BRPM | OXYGEN SATURATION: 98 %

## 2021-11-20 DIAGNOSIS — R51.9 CHRONIC NONINTRACTABLE HEADACHE, UNSPECIFIED HEADACHE TYPE: Primary | ICD-10-CM

## 2021-11-20 DIAGNOSIS — G89.29 CHRONIC NONINTRACTABLE HEADACHE, UNSPECIFIED HEADACHE TYPE: Primary | ICD-10-CM

## 2021-11-20 PROCEDURE — 99284 EMERGENCY DEPT VISIT MOD MDM: CPT | Mod: 25

## 2021-11-20 PROCEDURE — 25000003 PHARM REV CODE 250

## 2021-11-20 PROCEDURE — 63600175 PHARM REV CODE 636 W HCPCS

## 2021-11-20 PROCEDURE — 96372 THER/PROPH/DIAG INJ SC/IM: CPT

## 2021-11-20 RX ORDER — DEXAMETHASONE SODIUM PHOSPHATE 4 MG/ML
8 INJECTION, SOLUTION INTRA-ARTICULAR; INTRALESIONAL; INTRAMUSCULAR; INTRAVENOUS; SOFT TISSUE ONCE
Status: COMPLETED | OUTPATIENT
Start: 2021-11-20 | End: 2021-11-20

## 2021-11-20 RX ORDER — BUTALBITAL, ACETAMINOPHEN AND CAFFEINE 50; 325; 40 MG/1; MG/1; MG/1
1 TABLET ORAL EVERY 6 HOURS PRN
Qty: 5 TABLET | Refills: 0 | Status: SHIPPED | OUTPATIENT
Start: 2021-11-20 | End: 2022-10-03

## 2021-11-20 RX ORDER — PROMETHAZINE HYDROCHLORIDE 25 MG/ML
25 INJECTION, SOLUTION INTRAMUSCULAR; INTRAVENOUS
Status: DISCONTINUED | OUTPATIENT
Start: 2021-11-20 | End: 2021-11-20

## 2021-11-20 RX ORDER — BUPROPION HYDROCHLORIDE 300 MG/1
450 TABLET ORAL NIGHTLY
COMMUNITY

## 2021-11-20 RX ORDER — DEXAMETHASONE SODIUM PHOSPHATE 100 MG/10ML
8 INJECTION INTRAMUSCULAR; INTRAVENOUS
Status: DISCONTINUED | OUTPATIENT
Start: 2021-11-20 | End: 2021-11-20

## 2021-11-20 RX ORDER — DEXAMETHASONE SODIUM PHOSPHATE 4 MG/ML
INJECTION, SOLUTION INTRA-ARTICULAR; INTRALESIONAL; INTRAMUSCULAR; INTRAVENOUS; SOFT TISSUE
Status: COMPLETED
Start: 2021-11-20 | End: 2021-11-20

## 2021-11-20 RX ORDER — DEXAMETHASONE SODIUM PHOSPHATE 4 MG/ML
4 INJECTION, SOLUTION INTRA-ARTICULAR; INTRALESIONAL; INTRAMUSCULAR; INTRAVENOUS; SOFT TISSUE ONCE
Status: DISCONTINUED | OUTPATIENT
Start: 2021-11-20 | End: 2021-11-20

## 2021-11-20 RX ORDER — ONDANSETRON 4 MG/1
TABLET, ORALLY DISINTEGRATING ORAL
Status: COMPLETED
Start: 2021-11-20 | End: 2021-11-20

## 2021-11-20 RX ORDER — ONDANSETRON 4 MG/1
4 TABLET, ORALLY DISINTEGRATING ORAL
Status: COMPLETED | OUTPATIENT
Start: 2021-11-20 | End: 2021-11-20

## 2021-11-20 RX ORDER — TRAZODONE HYDROCHLORIDE 100 MG/1
100 TABLET ORAL NIGHTLY
COMMUNITY
End: 2021-12-09 | Stop reason: CLARIF

## 2021-11-20 RX ORDER — DEXAMETHASONE SODIUM PHOSPHATE 10 MG/ML
10 INJECTION INTRAMUSCULAR; INTRAVENOUS
Status: DISCONTINUED | OUTPATIENT
Start: 2021-11-20 | End: 2021-11-20

## 2021-11-20 RX ADMIN — DEXAMETHASONE SODIUM PHOSPHATE 8 MG: 4 INJECTION, SOLUTION INTRA-ARTICULAR; INTRALESIONAL; INTRAMUSCULAR; INTRAVENOUS; SOFT TISSUE at 02:11

## 2021-11-20 RX ADMIN — ONDANSETRON 4 MG: 4 TABLET, ORALLY DISINTEGRATING ORAL at 02:11

## 2021-11-22 ENCOUNTER — PATIENT MESSAGE (OUTPATIENT)
Dept: OBSTETRICS AND GYNECOLOGY | Facility: CLINIC | Age: 31
End: 2021-11-22
Payer: MEDICAID

## 2021-11-22 DIAGNOSIS — N93.9 ABNORMAL UTERINE BLEEDING (AUB): Primary | ICD-10-CM

## 2021-11-22 RX ORDER — ONDANSETRON 4 MG/1
4 TABLET, ORALLY DISINTEGRATING ORAL EVERY 6 HOURS PRN
Qty: 20 TABLET | Refills: 0 | Status: SHIPPED | OUTPATIENT
Start: 2021-11-22 | End: 2022-04-15 | Stop reason: ALTCHOICE

## 2021-12-09 ENCOUNTER — OFFICE VISIT (OUTPATIENT)
Dept: OBSTETRICS AND GYNECOLOGY | Facility: CLINIC | Age: 31
End: 2021-12-09
Payer: MEDICAID

## 2021-12-09 VITALS
SYSTOLIC BLOOD PRESSURE: 122 MMHG | RESPIRATION RATE: 16 BRPM | BODY MASS INDEX: 28.56 KG/M2 | DIASTOLIC BLOOD PRESSURE: 70 MMHG | HEIGHT: 64 IN | WEIGHT: 167.31 LBS

## 2021-12-09 DIAGNOSIS — R10.2 PELVIC PAIN: ICD-10-CM

## 2021-12-09 DIAGNOSIS — N93.9 ABNORMAL UTERINE BLEEDING (AUB): ICD-10-CM

## 2021-12-09 DIAGNOSIS — Z01.818 PREOP EXAMINATION: Primary | ICD-10-CM

## 2021-12-09 PROCEDURE — 99214 PR OFFICE/OUTPT VISIT, EST, LEVL IV, 30-39 MIN: ICD-10-PCS | Mod: S$PBB,,, | Performed by: STUDENT IN AN ORGANIZED HEALTH CARE EDUCATION/TRAINING PROGRAM

## 2021-12-09 PROCEDURE — 99999 PR PBB SHADOW E&M-EST. PATIENT-LVL IV: CPT | Mod: PBBFAC,,, | Performed by: STUDENT IN AN ORGANIZED HEALTH CARE EDUCATION/TRAINING PROGRAM

## 2021-12-09 PROCEDURE — 99214 OFFICE O/P EST MOD 30 MIN: CPT | Mod: S$PBB,,, | Performed by: STUDENT IN AN ORGANIZED HEALTH CARE EDUCATION/TRAINING PROGRAM

## 2021-12-09 PROCEDURE — 99214 OFFICE O/P EST MOD 30 MIN: CPT | Mod: PBBFAC,PN | Performed by: STUDENT IN AN ORGANIZED HEALTH CARE EDUCATION/TRAINING PROGRAM

## 2021-12-09 PROCEDURE — 99999 PR PBB SHADOW E&M-EST. PATIENT-LVL IV: ICD-10-PCS | Mod: PBBFAC,,, | Performed by: STUDENT IN AN ORGANIZED HEALTH CARE EDUCATION/TRAINING PROGRAM

## 2021-12-09 RX ORDER — SODIUM CHLORIDE 9 MG/ML
INJECTION, SOLUTION INTRAVENOUS CONTINUOUS
Status: CANCELLED | OUTPATIENT
Start: 2021-12-09

## 2021-12-13 ENCOUNTER — TELEPHONE (OUTPATIENT)
Dept: OBSTETRICS AND GYNECOLOGY | Facility: CLINIC | Age: 31
End: 2021-12-13
Payer: MEDICAID

## 2021-12-14 PROBLEM — Z98.890 STATUS POST LAPAROSCOPY: Status: ACTIVE | Noted: 2021-12-14

## 2021-12-17 ENCOUNTER — TELEPHONE (OUTPATIENT)
Dept: OBSTETRICS AND GYNECOLOGY | Facility: CLINIC | Age: 31
End: 2021-12-17
Payer: MEDICAID

## 2021-12-27 ENCOUNTER — TELEPHONE (OUTPATIENT)
Dept: RADIOLOGY | Facility: HOSPITAL | Age: 31
End: 2021-12-27
Payer: MEDICAID

## 2021-12-27 ENCOUNTER — OFFICE VISIT (OUTPATIENT)
Dept: OBSTETRICS AND GYNECOLOGY | Facility: CLINIC | Age: 31
End: 2021-12-27
Payer: MEDICAID

## 2021-12-27 VITALS
HEIGHT: 65 IN | BODY MASS INDEX: 28.43 KG/M2 | WEIGHT: 170.63 LBS | SYSTOLIC BLOOD PRESSURE: 132 MMHG | DIASTOLIC BLOOD PRESSURE: 84 MMHG

## 2021-12-27 DIAGNOSIS — N64.4 BREAST PAIN, LEFT: Primary | ICD-10-CM

## 2021-12-27 DIAGNOSIS — Z98.890 STATUS POST LAPAROSCOPY: ICD-10-CM

## 2021-12-27 PROCEDURE — 99213 OFFICE O/P EST LOW 20 MIN: CPT | Mod: S$PBB,,, | Performed by: STUDENT IN AN ORGANIZED HEALTH CARE EDUCATION/TRAINING PROGRAM

## 2021-12-27 PROCEDURE — 3079F DIAST BP 80-89 MM HG: CPT | Mod: CPTII,,, | Performed by: STUDENT IN AN ORGANIZED HEALTH CARE EDUCATION/TRAINING PROGRAM

## 2021-12-27 PROCEDURE — 99999 PR PBB SHADOW E&M-EST. PATIENT-LVL IV: ICD-10-PCS | Mod: PBBFAC,,, | Performed by: STUDENT IN AN ORGANIZED HEALTH CARE EDUCATION/TRAINING PROGRAM

## 2021-12-27 PROCEDURE — 1159F MED LIST DOCD IN RCRD: CPT | Mod: CPTII,,, | Performed by: STUDENT IN AN ORGANIZED HEALTH CARE EDUCATION/TRAINING PROGRAM

## 2021-12-27 PROCEDURE — 1159F PR MEDICATION LIST DOCUMENTED IN MEDICAL RECORD: ICD-10-PCS | Mod: CPTII,,, | Performed by: STUDENT IN AN ORGANIZED HEALTH CARE EDUCATION/TRAINING PROGRAM

## 2021-12-27 PROCEDURE — 99213 PR OFFICE/OUTPT VISIT, EST, LEVL III, 20-29 MIN: ICD-10-PCS | Mod: S$PBB,,, | Performed by: STUDENT IN AN ORGANIZED HEALTH CARE EDUCATION/TRAINING PROGRAM

## 2021-12-27 PROCEDURE — 3075F PR MOST RECENT SYSTOLIC BLOOD PRESS GE 130-139MM HG: ICD-10-PCS | Mod: CPTII,,, | Performed by: STUDENT IN AN ORGANIZED HEALTH CARE EDUCATION/TRAINING PROGRAM

## 2021-12-27 PROCEDURE — 99999 PR PBB SHADOW E&M-EST. PATIENT-LVL IV: CPT | Mod: PBBFAC,,, | Performed by: STUDENT IN AN ORGANIZED HEALTH CARE EDUCATION/TRAINING PROGRAM

## 2021-12-27 PROCEDURE — 3008F PR BODY MASS INDEX (BMI) DOCUMENTED: ICD-10-PCS | Mod: CPTII,,, | Performed by: STUDENT IN AN ORGANIZED HEALTH CARE EDUCATION/TRAINING PROGRAM

## 2021-12-27 PROCEDURE — 3079F PR MOST RECENT DIASTOLIC BLOOD PRESSURE 80-89 MM HG: ICD-10-PCS | Mod: CPTII,,, | Performed by: STUDENT IN AN ORGANIZED HEALTH CARE EDUCATION/TRAINING PROGRAM

## 2021-12-27 PROCEDURE — 3075F SYST BP GE 130 - 139MM HG: CPT | Mod: CPTII,,, | Performed by: STUDENT IN AN ORGANIZED HEALTH CARE EDUCATION/TRAINING PROGRAM

## 2021-12-27 PROCEDURE — 99214 OFFICE O/P EST MOD 30 MIN: CPT | Mod: PBBFAC,PN | Performed by: STUDENT IN AN ORGANIZED HEALTH CARE EDUCATION/TRAINING PROGRAM

## 2021-12-27 PROCEDURE — 3008F BODY MASS INDEX DOCD: CPT | Mod: CPTII,,, | Performed by: STUDENT IN AN ORGANIZED HEALTH CARE EDUCATION/TRAINING PROGRAM

## 2021-12-28 DIAGNOSIS — R10.2 PELVIC PAIN: Primary | ICD-10-CM

## 2021-12-31 ENCOUNTER — PATIENT MESSAGE (OUTPATIENT)
Dept: ADMINISTRATIVE | Facility: OTHER | Age: 31
End: 2021-12-31
Payer: MEDICAID

## 2021-12-31 ENCOUNTER — HOSPITAL ENCOUNTER (EMERGENCY)
Facility: HOSPITAL | Age: 31
Discharge: HOME | End: 2021-12-31
Payer: MEDICAID

## 2021-12-31 VITALS
TEMPERATURE: 99 F | BODY MASS INDEX: 27.83 KG/M2 | DIASTOLIC BLOOD PRESSURE: 92 MMHG | WEIGHT: 163 LBS | OXYGEN SATURATION: 98 % | HEIGHT: 64 IN | SYSTOLIC BLOOD PRESSURE: 128 MMHG | RESPIRATION RATE: 18 BRPM | HEART RATE: 101 BPM

## 2021-12-31 DIAGNOSIS — B34.9 VIRAL ILLNESS: Primary | ICD-10-CM

## 2021-12-31 LAB
INFLUENZA A, MOLECULAR: NEGATIVE
INFLUENZA B, MOLECULAR: NEGATIVE
SARS-COV-2 RDRP RESP QL NAA+PROBE: NEGATIVE
SPECIMEN SOURCE: NORMAL

## 2021-12-31 PROCEDURE — 99283 EMERGENCY DEPT VISIT LOW MDM: CPT

## 2021-12-31 PROCEDURE — 87502 INFLUENZA DNA AMP PROBE: CPT | Performed by: NURSE PRACTITIONER

## 2021-12-31 PROCEDURE — U0002 COVID-19 LAB TEST NON-CDC: HCPCS | Performed by: NURSE PRACTITIONER
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
## 2021-12-31 NOTE — DISCHARGE INSTRUCTIONS
One.  Treat viral illness symptoms  Two.  Follow-up with primary care provider   Three.  Return to ED as needed

## 2021-12-31 NOTE — Clinical Note
"Rosa Stephens" Melvin was seen and treated in our emergency department on 12/31/2021.  She may return to work on 01/03/2022.       If you have any questions or concerns, please don't hesitate to call.      Luis Antonio Mcdaniels NP    "

## 2021-12-31 NOTE — Clinical Note
"Rosa Stephens" Melvin was seen and treated in our emergency department on 12/31/2021.  She may return to work on 01/03/2022.       If you have any questions or concerns, please don't hesitate to call.      Luis Antonio Mcdaniels RN    "

## 2021-12-31 NOTE — ED TRIAGE NOTES
Reports cough, sore throat and fever x 2 days. Recent exposure to flu and covid. Denies vaccinations

## 2022-01-09 ENCOUNTER — NURSE TRIAGE (OUTPATIENT)
Dept: ADMINISTRATIVE | Facility: CLINIC | Age: 32
End: 2022-01-09
Payer: MEDICAID

## 2022-01-09 ENCOUNTER — PATIENT MESSAGE (OUTPATIENT)
Dept: OBSTETRICS AND GYNECOLOGY | Facility: CLINIC | Age: 32
End: 2022-01-09
Payer: MEDICAID

## 2022-01-09 NOTE — TELEPHONE ENCOUNTER
Patient recently had abdominal sx on 12/14. She c/o redness, swelling, clear/bloody drainage, tenderness 3/10/pain to one of her incisions. Wound is still intact. Will call the on call provider.     Per Dr. De Luna, the patient can apply neosporin to the wound. She should contact her OBGYN tomorrow. Patient VU. Advised the patient to call back with any further questions or if symptoms worsen.        Reason for Disposition   [1] Pus or bad-smelling fluid draining from incision AND [2] no fever    Additional Information   Negative: [1] Major abdominal surgical incision AND [2] wound gaping open AND [3] visible internal organs   Negative: Sounds like a life-threatening emergency to the triager   Negative: [1] Bleeding from incision AND [2] won't stop after 10 minutes of direct pressure   Negative: [1] Widespread rash AND [2] bright red, sunburn-like   Negative: Severe pain in the incision   Negative: [1] Incision gaping open AND [2] < 48 hours since wound re-opened   Negative: [1] Incision gaping open AND [2] length of opening > 2 inches (5 cm)   Negative: Patient sounds very sick or weak to the triager   Negative: Sounds like a serious complication to the triager   Negative: Fever > 100.4 F (38.0 C)   Negative: [1] Incision looks infected (spreading redness, pain) AND [2] fever > 99.5 F (37.5 C)   Negative: [1] Incision looks infected (spreading redness, pain) AND [2] large red area (> 2 in. or 5 cm)   Negative: [1] Incision looks infected (spreading redness, pain) AND [2] face wound   Negative: [1] Red streak runs from the incision AND [2] longer than 1 inch (2.5 cm)    Protocols used: POST-OP INCISION SYMPTOMS AND [id number]

## 2022-01-13 ENCOUNTER — HOSPITAL ENCOUNTER (OUTPATIENT)
Dept: RADIOLOGY | Facility: HOSPITAL | Age: 32
Discharge: HOME | End: 2022-01-13
Attending: OBSTETRICS & GYNECOLOGY
Payer: MEDICAID

## 2022-01-13 ENCOUNTER — HOSPITAL ENCOUNTER (OUTPATIENT)
Dept: RADIOLOGY | Facility: HOSPITAL | Age: 32
Discharge: HOME | End: 2022-01-13
Attending: STUDENT IN AN ORGANIZED HEALTH CARE EDUCATION/TRAINING PROGRAM
Payer: MEDICAID

## 2022-01-13 DIAGNOSIS — N64.4 BREAST PAIN, LEFT: ICD-10-CM

## 2022-01-13 DIAGNOSIS — N64.4 BREAST PAIN IN FEMALE: ICD-10-CM

## 2022-01-13 PROCEDURE — 77065 DX MAMMO INCL CAD UNI: CPT | Mod: 26,LT,, | Performed by: RADIOLOGY

## 2022-01-13 PROCEDURE — 76642 US BREAST LEFT LIMITED: ICD-10-PCS | Mod: 26,LT,, | Performed by: RADIOLOGY

## 2022-01-13 PROCEDURE — 77061 MAMMO DIGITAL DIAGNOSTIC LEFT WITH TOMO: ICD-10-PCS | Mod: 26,LT,, | Performed by: RADIOLOGY

## 2022-01-13 PROCEDURE — 77065 MAMMO DIGITAL DIAGNOSTIC LEFT WITH TOMO: ICD-10-PCS | Mod: 26,LT,, | Performed by: RADIOLOGY

## 2022-01-13 PROCEDURE — 76642 ULTRASOUND BREAST LIMITED: CPT | Mod: TC,PO,LT

## 2022-01-13 PROCEDURE — 77061 BREAST TOMOSYNTHESIS UNI: CPT | Mod: 26,LT,, | Performed by: RADIOLOGY

## 2022-01-13 PROCEDURE — 77065 DX MAMMO INCL CAD UNI: CPT | Mod: TC,PO,LT

## 2022-01-13 PROCEDURE — 76642 ULTRASOUND BREAST LIMITED: CPT | Mod: 26,LT,, | Performed by: RADIOLOGY

## 2022-01-16 ENCOUNTER — HOSPITAL ENCOUNTER (EMERGENCY)
Facility: HOSPITAL | Age: 32
Discharge: HOME | End: 2022-01-16
Attending: EMERGENCY MEDICINE
Payer: MEDICAID

## 2022-01-16 VITALS
WEIGHT: 163 LBS | RESPIRATION RATE: 16 BRPM | BODY MASS INDEX: 27.83 KG/M2 | OXYGEN SATURATION: 100 % | TEMPERATURE: 98 F | HEART RATE: 101 BPM | DIASTOLIC BLOOD PRESSURE: 76 MMHG | SYSTOLIC BLOOD PRESSURE: 124 MMHG | HEIGHT: 64 IN

## 2022-01-16 DIAGNOSIS — R07.9 CHEST PAIN: ICD-10-CM

## 2022-01-16 DIAGNOSIS — T81.89XA SUTURE GRANULOMA, INITIAL ENCOUNTER: Primary | ICD-10-CM

## 2022-01-16 LAB
ALBUMIN SERPL BCP-MCNC: 3.9 G/DL (ref 3.5–5.2)
ALP SERPL-CCNC: 61 U/L (ref 55–135)
ALT SERPL W/O P-5'-P-CCNC: 9 U/L (ref 10–44)
ANION GAP SERPL CALC-SCNC: 11 MMOL/L (ref 8–16)
AST SERPL-CCNC: 10 U/L (ref 10–40)
BASOPHILS # BLD AUTO: 0.03 K/UL (ref 0–0.2)
BASOPHILS NFR BLD: 0.6 % (ref 0–1.9)
BILIRUB SERPL-MCNC: 0.9 MG/DL (ref 0.1–1)
BILIRUB UR QL STRIP: NEGATIVE
BUN SERPL-MCNC: 12 MG/DL (ref 6–20)
CALCIUM SERPL-MCNC: 8.7 MG/DL (ref 8.7–10.5)
CHLORIDE SERPL-SCNC: 107 MMOL/L (ref 95–110)
CLARITY UR: CLEAR
CO2 SERPL-SCNC: 21 MMOL/L (ref 23–29)
COLOR UR: YELLOW
CREAT SERPL-MCNC: 0.7 MG/DL (ref 0.5–1.4)
DIFFERENTIAL METHOD: ABNORMAL
EOSINOPHIL # BLD AUTO: 0.2 K/UL (ref 0–0.5)
EOSINOPHIL NFR BLD: 3.1 % (ref 0–8)
ERYTHROCYTE [DISTWIDTH] IN BLOOD BY AUTOMATED COUNT: 13.6 % (ref 11.5–14.5)
EST. GFR  (AFRICAN AMERICAN): >60 ML/MIN/1.73 M^2
EST. GFR  (NON AFRICAN AMERICAN): >60 ML/MIN/1.73 M^2
GLUCOSE SERPL-MCNC: 89 MG/DL (ref 70–110)
GLUCOSE UR QL STRIP: NEGATIVE
HCT VFR BLD AUTO: 33.5 % (ref 37–48.5)
HGB BLD-MCNC: 11.6 G/DL (ref 12–16)
HGB UR QL STRIP: NEGATIVE
IMM GRANULOCYTES # BLD AUTO: 0.01 K/UL (ref 0–0.04)
IMM GRANULOCYTES NFR BLD AUTO: 0.2 % (ref 0–0.5)
KETONES UR QL STRIP: NEGATIVE
LEUKOCYTE ESTERASE UR QL STRIP: NEGATIVE
LYMPHOCYTES # BLD AUTO: 1.8 K/UL (ref 1–4.8)
LYMPHOCYTES NFR BLD: 37.7 % (ref 18–48)
MCH RBC QN AUTO: 31.4 PG (ref 27–31)
MCHC RBC AUTO-ENTMCNC: 34.6 G/DL (ref 32–36)
MCV RBC AUTO: 91 FL (ref 82–98)
MONOCYTES # BLD AUTO: 0.5 K/UL (ref 0.3–1)
MONOCYTES NFR BLD: 10.3 % (ref 4–15)
NEUTROPHILS # BLD AUTO: 2.3 K/UL (ref 1.8–7.7)
NEUTROPHILS NFR BLD: 48.1 % (ref 38–73)
NITRITE UR QL STRIP: NEGATIVE
NRBC BLD-RTO: 0 /100 WBC
PH UR STRIP: 6 [PH] (ref 5–8)
PLATELET # BLD AUTO: 185 K/UL (ref 150–450)
PMV BLD AUTO: 9.3 FL (ref 9.2–12.9)
POTASSIUM SERPL-SCNC: 3.5 MMOL/L (ref 3.5–5.1)
PROT SERPL-MCNC: 6.6 G/DL (ref 6–8.4)
PROT UR QL STRIP: NEGATIVE
RBC # BLD AUTO: 3.69 M/UL (ref 4–5.4)
SODIUM SERPL-SCNC: 139 MMOL/L (ref 136–145)
SP GR UR STRIP: >=1.03 (ref 1–1.03)
URN SPEC COLLECT METH UR: ABNORMAL
UROBILINOGEN UR STRIP-ACNC: NEGATIVE EU/DL
WBC # BLD AUTO: 4.78 K/UL (ref 3.9–12.7)

## 2022-01-16 PROCEDURE — 85025 COMPLETE CBC W/AUTO DIFF WBC: CPT | Performed by: EMERGENCY MEDICINE

## 2022-01-16 PROCEDURE — 81003 URINALYSIS AUTO W/O SCOPE: CPT | Performed by: EMERGENCY MEDICINE

## 2022-01-16 PROCEDURE — 99284 EMERGENCY DEPT VISIT MOD MDM: CPT | Mod: 25

## 2022-01-16 PROCEDURE — 80053 COMPREHEN METABOLIC PANEL: CPT | Performed by: EMERGENCY MEDICINE

## 2022-01-16 PROCEDURE — 36415 COLL VENOUS BLD VENIPUNCTURE: CPT | Performed by: EMERGENCY MEDICINE

## 2022-01-16 RX ORDER — SULFAMETHOXAZOLE AND TRIMETHOPRIM 800; 160 MG/1; MG/1
1 TABLET ORAL 2 TIMES DAILY
Qty: 14 TABLET | Refills: 0 | Status: SHIPPED | OUTPATIENT
Start: 2022-01-16 | End: 2022-01-23

## 2022-01-16 RX ORDER — SULFAMETHOXAZOLE AND TRIMETHOPRIM 800; 160 MG/1; MG/1
1 TABLET ORAL 2 TIMES DAILY
Qty: 14 TABLET | Refills: 0 | Status: SHIPPED | OUTPATIENT
Start: 2022-01-16 | End: 2022-01-16 | Stop reason: SDUPTHER

## 2022-01-16 RX ORDER — MUPIROCIN 20 MG/G
OINTMENT TOPICAL 3 TIMES DAILY
Qty: 1 EACH | Refills: 0 | Status: SHIPPED | OUTPATIENT
Start: 2022-01-16 | End: 2022-04-15 | Stop reason: ALTCHOICE

## 2022-01-16 NOTE — Clinical Note
"Rosa "Sienna Charles was seen and treated in our emergency department on 1/16/2022.  She may return to work on 01/19/2022.  When you return to work no heavy lifting for a week.     If you have any questions or concerns, please don't hesitate to call.      Twin Lyons MD"

## 2022-01-16 NOTE — DISCHARGE INSTRUCTIONS
Keep wound clean and dry with Dial as cold soap.  Take all the antibiotics as prescribed and apply the mupirocin twice a day.
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
## 2022-01-19 ENCOUNTER — PATIENT MESSAGE (OUTPATIENT)
Dept: OBSTETRICS AND GYNECOLOGY | Facility: CLINIC | Age: 32
End: 2022-01-19
Payer: MEDICAID

## 2022-01-20 ENCOUNTER — PATIENT MESSAGE (OUTPATIENT)
Dept: OBSTETRICS AND GYNECOLOGY | Facility: CLINIC | Age: 32
End: 2022-01-20
Payer: MEDICAID

## 2022-01-21 ENCOUNTER — PATIENT MESSAGE (OUTPATIENT)
Dept: OBSTETRICS AND GYNECOLOGY | Facility: CLINIC | Age: 32
End: 2022-01-21
Payer: MEDICAID

## 2022-01-24 ENCOUNTER — PATIENT MESSAGE (OUTPATIENT)
Dept: OBSTETRICS AND GYNECOLOGY | Facility: CLINIC | Age: 32
End: 2022-01-24
Payer: MEDICAID

## 2022-01-25 ENCOUNTER — OFFICE VISIT (OUTPATIENT)
Dept: OBSTETRICS AND GYNECOLOGY | Facility: CLINIC | Age: 32
End: 2022-01-25
Payer: MEDICAID

## 2022-01-25 VITALS
RESPIRATION RATE: 14 BRPM | HEIGHT: 64 IN | SYSTOLIC BLOOD PRESSURE: 122 MMHG | WEIGHT: 168 LBS | BODY MASS INDEX: 28.68 KG/M2 | DIASTOLIC BLOOD PRESSURE: 76 MMHG

## 2022-01-25 DIAGNOSIS — T81.49XA INFLAMMATION OF OPERATIVE INCISION: Primary | ICD-10-CM

## 2022-01-25 PROCEDURE — 1159F MED LIST DOCD IN RCRD: CPT | Mod: CPTII,,, | Performed by: STUDENT IN AN ORGANIZED HEALTH CARE EDUCATION/TRAINING PROGRAM

## 2022-01-25 PROCEDURE — 3074F SYST BP LT 130 MM HG: CPT | Mod: CPTII,,, | Performed by: STUDENT IN AN ORGANIZED HEALTH CARE EDUCATION/TRAINING PROGRAM

## 2022-01-25 PROCEDURE — 99213 PR OFFICE/OUTPT VISIT, EST, LEVL III, 20-29 MIN: ICD-10-PCS | Mod: S$PBB,,, | Performed by: STUDENT IN AN ORGANIZED HEALTH CARE EDUCATION/TRAINING PROGRAM

## 2022-01-25 PROCEDURE — 99214 OFFICE O/P EST MOD 30 MIN: CPT | Mod: PBBFAC,PN | Performed by: STUDENT IN AN ORGANIZED HEALTH CARE EDUCATION/TRAINING PROGRAM

## 2022-01-25 PROCEDURE — 3008F PR BODY MASS INDEX (BMI) DOCUMENTED: ICD-10-PCS | Mod: CPTII,,, | Performed by: STUDENT IN AN ORGANIZED HEALTH CARE EDUCATION/TRAINING PROGRAM

## 2022-01-25 PROCEDURE — 99999 PR PBB SHADOW E&M-EST. PATIENT-LVL IV: CPT | Mod: PBBFAC,,, | Performed by: STUDENT IN AN ORGANIZED HEALTH CARE EDUCATION/TRAINING PROGRAM

## 2022-01-25 PROCEDURE — 3078F DIAST BP <80 MM HG: CPT | Mod: CPTII,,, | Performed by: STUDENT IN AN ORGANIZED HEALTH CARE EDUCATION/TRAINING PROGRAM

## 2022-01-25 PROCEDURE — 3074F PR MOST RECENT SYSTOLIC BLOOD PRESSURE < 130 MM HG: ICD-10-PCS | Mod: CPTII,,, | Performed by: STUDENT IN AN ORGANIZED HEALTH CARE EDUCATION/TRAINING PROGRAM

## 2022-01-25 PROCEDURE — 3078F PR MOST RECENT DIASTOLIC BLOOD PRESSURE < 80 MM HG: ICD-10-PCS | Mod: CPTII,,, | Performed by: STUDENT IN AN ORGANIZED HEALTH CARE EDUCATION/TRAINING PROGRAM

## 2022-01-25 PROCEDURE — 1159F PR MEDICATION LIST DOCUMENTED IN MEDICAL RECORD: ICD-10-PCS | Mod: CPTII,,, | Performed by: STUDENT IN AN ORGANIZED HEALTH CARE EDUCATION/TRAINING PROGRAM

## 2022-01-25 PROCEDURE — 99999 PR PBB SHADOW E&M-EST. PATIENT-LVL IV: ICD-10-PCS | Mod: PBBFAC,,, | Performed by: STUDENT IN AN ORGANIZED HEALTH CARE EDUCATION/TRAINING PROGRAM

## 2022-01-25 PROCEDURE — 99213 OFFICE O/P EST LOW 20 MIN: CPT | Mod: S$PBB,,, | Performed by: STUDENT IN AN ORGANIZED HEALTH CARE EDUCATION/TRAINING PROGRAM

## 2022-01-25 PROCEDURE — 3008F BODY MASS INDEX DOCD: CPT | Mod: CPTII,,, | Performed by: STUDENT IN AN ORGANIZED HEALTH CARE EDUCATION/TRAINING PROGRAM
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
## 2022-01-31 ENCOUNTER — PATIENT MESSAGE (OUTPATIENT)
Dept: OBSTETRICS AND GYNECOLOGY | Facility: CLINIC | Age: 32
End: 2022-01-31
Payer: MEDICAID

## 2022-02-03 ENCOUNTER — HOSPITAL ENCOUNTER (EMERGENCY)
Facility: HOSPITAL | Age: 32
Discharge: HOME | End: 2022-02-03
Attending: EMERGENCY MEDICINE
Payer: MEDICAID

## 2022-02-03 ENCOUNTER — PATIENT MESSAGE (OUTPATIENT)
Dept: OBSTETRICS AND GYNECOLOGY | Facility: CLINIC | Age: 32
End: 2022-02-03
Payer: MEDICAID

## 2022-02-03 VITALS
RESPIRATION RATE: 20 BRPM | TEMPERATURE: 98 F | HEIGHT: 64 IN | WEIGHT: 164 LBS | DIASTOLIC BLOOD PRESSURE: 85 MMHG | OXYGEN SATURATION: 99 % | SYSTOLIC BLOOD PRESSURE: 121 MMHG | HEART RATE: 90 BPM | BODY MASS INDEX: 28 KG/M2

## 2022-02-03 DIAGNOSIS — G89.18 POST-OP PAIN: Primary | ICD-10-CM

## 2022-02-03 PROCEDURE — 99281 EMR DPT VST MAYX REQ PHY/QHP: CPT

## 2022-02-04 NOTE — DISCHARGE INSTRUCTIONS
Follow-up with your OBGYN tomorrow as we discussed.  Use Bactroban in the meantime.  Take Tylenol and Motrin for discomfort.  Return here as needed or if worse in any way.

## 2022-02-04 NOTE — ED NOTES
Pt. Has surgical incision from Polyp removal in umbilicus that is red and tender to the touch. Pt. C/o pain to RLQ upon palpation and stated her incision and abdominal pain has progressively gotten more uncomfortable as the day has progressed.
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
## 2022-03-11 ENCOUNTER — PATIENT MESSAGE (OUTPATIENT)
Dept: OBSTETRICS AND GYNECOLOGY | Facility: CLINIC | Age: 32
End: 2022-03-11
Payer: MEDICAID

## 2022-03-17 ENCOUNTER — OFFICE VISIT (OUTPATIENT)
Dept: OBSTETRICS AND GYNECOLOGY | Facility: CLINIC | Age: 32
End: 2022-03-17
Payer: MEDICAID

## 2022-03-17 ENCOUNTER — TELEPHONE (OUTPATIENT)
Dept: OBSTETRICS AND GYNECOLOGY | Facility: CLINIC | Age: 32
End: 2022-03-17

## 2022-03-17 VITALS
SYSTOLIC BLOOD PRESSURE: 118 MMHG | RESPIRATION RATE: 16 BRPM | DIASTOLIC BLOOD PRESSURE: 66 MMHG | HEIGHT: 64 IN | WEIGHT: 166.19 LBS | BODY MASS INDEX: 28.37 KG/M2

## 2022-03-17 DIAGNOSIS — Z01.818 PREOP EXAMINATION: Primary | ICD-10-CM

## 2022-03-17 DIAGNOSIS — R10.2 PELVIC PAIN: ICD-10-CM

## 2022-03-17 PROCEDURE — 99999 PR PBB SHADOW E&M-EST. PATIENT-LVL IV: CPT | Mod: PBBFAC,,, | Performed by: STUDENT IN AN ORGANIZED HEALTH CARE EDUCATION/TRAINING PROGRAM

## 2022-03-17 PROCEDURE — 1159F MED LIST DOCD IN RCRD: CPT | Mod: CPTII,,, | Performed by: STUDENT IN AN ORGANIZED HEALTH CARE EDUCATION/TRAINING PROGRAM

## 2022-03-17 PROCEDURE — 99214 OFFICE O/P EST MOD 30 MIN: CPT | Mod: PBBFAC,PN | Performed by: STUDENT IN AN ORGANIZED HEALTH CARE EDUCATION/TRAINING PROGRAM

## 2022-03-17 PROCEDURE — 1159F PR MEDICATION LIST DOCUMENTED IN MEDICAL RECORD: ICD-10-PCS | Mod: CPTII,,, | Performed by: STUDENT IN AN ORGANIZED HEALTH CARE EDUCATION/TRAINING PROGRAM

## 2022-03-17 PROCEDURE — 3078F DIAST BP <80 MM HG: CPT | Mod: CPTII,,, | Performed by: STUDENT IN AN ORGANIZED HEALTH CARE EDUCATION/TRAINING PROGRAM

## 2022-03-17 PROCEDURE — 3074F PR MOST RECENT SYSTOLIC BLOOD PRESSURE < 130 MM HG: ICD-10-PCS | Mod: CPTII,,, | Performed by: STUDENT IN AN ORGANIZED HEALTH CARE EDUCATION/TRAINING PROGRAM

## 2022-03-17 PROCEDURE — 3008F PR BODY MASS INDEX (BMI) DOCUMENTED: ICD-10-PCS | Mod: CPTII,,, | Performed by: STUDENT IN AN ORGANIZED HEALTH CARE EDUCATION/TRAINING PROGRAM

## 2022-03-17 PROCEDURE — 99214 OFFICE O/P EST MOD 30 MIN: CPT | Mod: S$PBB,,, | Performed by: STUDENT IN AN ORGANIZED HEALTH CARE EDUCATION/TRAINING PROGRAM

## 2022-03-17 PROCEDURE — 3008F BODY MASS INDEX DOCD: CPT | Mod: CPTII,,, | Performed by: STUDENT IN AN ORGANIZED HEALTH CARE EDUCATION/TRAINING PROGRAM

## 2022-03-17 PROCEDURE — 3074F SYST BP LT 130 MM HG: CPT | Mod: CPTII,,, | Performed by: STUDENT IN AN ORGANIZED HEALTH CARE EDUCATION/TRAINING PROGRAM

## 2022-03-17 PROCEDURE — 3078F PR MOST RECENT DIASTOLIC BLOOD PRESSURE < 80 MM HG: ICD-10-PCS | Mod: CPTII,,, | Performed by: STUDENT IN AN ORGANIZED HEALTH CARE EDUCATION/TRAINING PROGRAM

## 2022-03-17 PROCEDURE — 99999 PR PBB SHADOW E&M-EST. PATIENT-LVL IV: ICD-10-PCS | Mod: PBBFAC,,, | Performed by: STUDENT IN AN ORGANIZED HEALTH CARE EDUCATION/TRAINING PROGRAM

## 2022-03-17 PROCEDURE — 99214 PR OFFICE/OUTPT VISIT, EST, LEVL IV, 30-39 MIN: ICD-10-PCS | Mod: S$PBB,,, | Performed by: STUDENT IN AN ORGANIZED HEALTH CARE EDUCATION/TRAINING PROGRAM

## 2022-03-17 RX ORDER — SODIUM CHLORIDE 9 MG/ML
INJECTION, SOLUTION INTRAVENOUS CONTINUOUS
Status: CANCELLED | OUTPATIENT
Start: 2022-03-17
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
## 2022-03-17 NOTE — TELEPHONE ENCOUNTER
----- Message from Betty Fuentes MD sent at 3/17/2022  8:53 AM CDT -----  Can we let surgery desk know adding on left oophrectomy to case nivia and she'll be staying overnight

## 2022-03-19 ENCOUNTER — OFFICE VISIT (OUTPATIENT)
Dept: URGENT CARE | Facility: CLINIC | Age: 32
End: 2022-03-19
Payer: MEDICAID

## 2022-03-19 VITALS
HEIGHT: 64 IN | HEART RATE: 97 BPM | WEIGHT: 165.63 LBS | BODY MASS INDEX: 28.28 KG/M2 | RESPIRATION RATE: 16 BRPM | TEMPERATURE: 98 F | OXYGEN SATURATION: 99 % | SYSTOLIC BLOOD PRESSURE: 107 MMHG | DIASTOLIC BLOOD PRESSURE: 70 MMHG

## 2022-03-19 DIAGNOSIS — Z11.52 ENCOUNTER FOR SCREENING LABORATORY TESTING FOR COVID-19 VIRUS: Primary | ICD-10-CM

## 2022-03-19 LAB
CTP QC/QA: YES
SARS-COV-2 AG RESP QL IA.RAPID: NEGATIVE

## 2022-03-19 PROCEDURE — 3074F PR MOST RECENT SYSTOLIC BLOOD PRESSURE < 130 MM HG: ICD-10-PCS | Mod: CPTII,S$GLB,, | Performed by: NURSE PRACTITIONER

## 2022-03-19 PROCEDURE — 3008F PR BODY MASS INDEX (BMI) DOCUMENTED: ICD-10-PCS | Mod: CPTII,S$GLB,, | Performed by: NURSE PRACTITIONER

## 2022-03-19 PROCEDURE — 3008F BODY MASS INDEX DOCD: CPT | Mod: CPTII,S$GLB,, | Performed by: NURSE PRACTITIONER

## 2022-03-19 PROCEDURE — 1160F PR REVIEW ALL MEDS BY PRESCRIBER/CLIN PHARMACIST DOCUMENTED: ICD-10-PCS | Mod: CPTII,S$GLB,, | Performed by: NURSE PRACTITIONER

## 2022-03-19 PROCEDURE — 99213 PR OFFICE/OUTPT VISIT, EST, LEVL III, 20-29 MIN: ICD-10-PCS | Mod: S$GLB,,, | Performed by: NURSE PRACTITIONER

## 2022-03-19 PROCEDURE — 99213 OFFICE O/P EST LOW 20 MIN: CPT | Mod: S$GLB,,, | Performed by: NURSE PRACTITIONER

## 2022-03-19 PROCEDURE — 3078F PR MOST RECENT DIASTOLIC BLOOD PRESSURE < 80 MM HG: ICD-10-PCS | Mod: CPTII,S$GLB,, | Performed by: NURSE PRACTITIONER

## 2022-03-19 PROCEDURE — 1160F RVW MEDS BY RX/DR IN RCRD: CPT | Mod: CPTII,S$GLB,, | Performed by: NURSE PRACTITIONER

## 2022-03-19 PROCEDURE — 87811 SARS CORONAVIRUS 2 ANTIGEN POCT, MANUAL READ: ICD-10-PCS | Mod: S$GLB,,, | Performed by: NURSE PRACTITIONER

## 2022-03-19 PROCEDURE — 1159F PR MEDICATION LIST DOCUMENTED IN MEDICAL RECORD: ICD-10-PCS | Mod: CPTII,S$GLB,, | Performed by: NURSE PRACTITIONER

## 2022-03-19 PROCEDURE — 3074F SYST BP LT 130 MM HG: CPT | Mod: CPTII,S$GLB,, | Performed by: NURSE PRACTITIONER

## 2022-03-19 PROCEDURE — 3078F DIAST BP <80 MM HG: CPT | Mod: CPTII,S$GLB,, | Performed by: NURSE PRACTITIONER

## 2022-03-19 PROCEDURE — 87811 SARS-COV-2 COVID19 W/OPTIC: CPT | Mod: S$GLB,,, | Performed by: NURSE PRACTITIONER

## 2022-03-19 PROCEDURE — 1159F MED LIST DOCD IN RCRD: CPT | Mod: CPTII,S$GLB,, | Performed by: NURSE PRACTITIONER
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
## 2022-03-22 PROBLEM — Z90.710 S/P LAPAROSCOPIC HYSTERECTOMY: Status: ACTIVE | Noted: 2022-03-22

## 2022-03-23 ENCOUNTER — PATIENT MESSAGE (OUTPATIENT)
Dept: OBSTETRICS AND GYNECOLOGY | Facility: CLINIC | Age: 32
End: 2022-03-23
Payer: MEDICAID

## 2022-03-23 ENCOUNTER — TELEPHONE (OUTPATIENT)
Dept: OBSTETRICS AND GYNECOLOGY | Facility: CLINIC | Age: 32
End: 2022-03-23
Payer: MEDICAID

## 2022-03-23 NOTE — TELEPHONE ENCOUNTER
Spoke with [facility] and they fixed pt's name in the system and will fill her prescription. Pt notified. Voiced understanding. Post-op appointment made.

## 2022-03-23 NOTE — TELEPHONE ENCOUNTER
----- Message from Marisol Enriquez sent at 3/23/2022  9:21 AM CDT -----  Regarding: refill  Contact: patient  Type: Needs Medical Advice  Who Called:  patient  Pharmacy name and phone #:    [id number] - [place], [place] - [address] [address] AT [facility] [address]  [address] [address]  Phone: [phone] Fax: [phone]  Best Call Back Number: [phone] (home)   Additional Information: Patient states the pharmacy can not fill her medication due to they were submitted under the last name of Keo. They needed to be resubmitted under Melvin. Her name is still Melvin with medicaid. Please call patient to advise.Thanks!    ondansetron (ZOFRAN-ODT) 4 MG TbDL  ibuprofen (ADVIL,MOTRIN) 800 MG tablet  oxyCODONE-acetaminophen (PERCOCET) 5-325 mg per tablet

## 2022-03-25 ENCOUNTER — PATIENT MESSAGE (OUTPATIENT)
Dept: OBSTETRICS AND GYNECOLOGY | Facility: CLINIC | Age: 32
End: 2022-03-25
Payer: MEDICAID

## 2022-04-05 ENCOUNTER — OFFICE VISIT (OUTPATIENT)
Dept: OBSTETRICS AND GYNECOLOGY | Facility: CLINIC | Age: 32
End: 2022-04-05
Payer: MEDICAID

## 2022-04-05 VITALS — RESPIRATION RATE: 16 BRPM | HEIGHT: 65 IN | BODY MASS INDEX: 28.05 KG/M2

## 2022-04-05 DIAGNOSIS — Z90.710 S/P LAPAROSCOPIC HYSTERECTOMY: Primary | ICD-10-CM

## 2022-04-05 PROCEDURE — 3008F BODY MASS INDEX DOCD: CPT | Mod: CPTII,,, | Performed by: STUDENT IN AN ORGANIZED HEALTH CARE EDUCATION/TRAINING PROGRAM

## 2022-04-05 PROCEDURE — 99999 PR PBB SHADOW E&M-EST. PATIENT-LVL III: CPT | Mod: PBBFAC,,, | Performed by: STUDENT IN AN ORGANIZED HEALTH CARE EDUCATION/TRAINING PROGRAM

## 2022-04-05 PROCEDURE — 99213 OFFICE O/P EST LOW 20 MIN: CPT | Mod: PBBFAC,PN | Performed by: STUDENT IN AN ORGANIZED HEALTH CARE EDUCATION/TRAINING PROGRAM

## 2022-04-05 PROCEDURE — 99024 PR POST-OP FOLLOW-UP VISIT: ICD-10-PCS | Mod: ,,, | Performed by: STUDENT IN AN ORGANIZED HEALTH CARE EDUCATION/TRAINING PROGRAM

## 2022-04-05 PROCEDURE — 1159F MED LIST DOCD IN RCRD: CPT | Mod: CPTII,,, | Performed by: STUDENT IN AN ORGANIZED HEALTH CARE EDUCATION/TRAINING PROGRAM

## 2022-04-05 PROCEDURE — 99024 POSTOP FOLLOW-UP VISIT: CPT | Mod: ,,, | Performed by: STUDENT IN AN ORGANIZED HEALTH CARE EDUCATION/TRAINING PROGRAM

## 2022-04-05 PROCEDURE — 99999 PR PBB SHADOW E&M-EST. PATIENT-LVL III: ICD-10-PCS | Mod: PBBFAC,,, | Performed by: STUDENT IN AN ORGANIZED HEALTH CARE EDUCATION/TRAINING PROGRAM

## 2022-04-05 PROCEDURE — 1159F PR MEDICATION LIST DOCUMENTED IN MEDICAL RECORD: ICD-10-PCS | Mod: CPTII,,, | Performed by: STUDENT IN AN ORGANIZED HEALTH CARE EDUCATION/TRAINING PROGRAM

## 2022-04-05 PROCEDURE — 3008F PR BODY MASS INDEX (BMI) DOCUMENTED: ICD-10-PCS | Mod: CPTII,,, | Performed by: STUDENT IN AN ORGANIZED HEALTH CARE EDUCATION/TRAINING PROGRAM
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
## 2022-04-15 ENCOUNTER — HOSPITAL ENCOUNTER (EMERGENCY)
Facility: HOSPITAL | Age: 32
Discharge: HOME | End: 2022-04-15
Attending: FAMILY MEDICINE
Payer: MEDICAID

## 2022-04-15 VITALS
DIASTOLIC BLOOD PRESSURE: 86 MMHG | BODY MASS INDEX: 27.31 KG/M2 | SYSTOLIC BLOOD PRESSURE: 118 MMHG | TEMPERATURE: 99 F | RESPIRATION RATE: 20 BRPM | HEIGHT: 64 IN | HEART RATE: 96 BPM | OXYGEN SATURATION: 98 % | WEIGHT: 160 LBS

## 2022-04-15 DIAGNOSIS — S20.212A RIB CONTUSION, LEFT, INITIAL ENCOUNTER: Primary | ICD-10-CM

## 2022-04-15 PROCEDURE — 71101 X-RAY EXAM UNILAT RIBS/CHEST: CPT | Mod: 26,LT,, | Performed by: RADIOLOGY

## 2022-04-15 PROCEDURE — 94799 UNLISTED PULMONARY SVC/PX: CPT

## 2022-04-15 PROCEDURE — 96372 THER/PROPH/DIAG INJ SC/IM: CPT | Performed by: NURSE PRACTITIONER

## 2022-04-15 PROCEDURE — 99900035 HC TECH TIME PER 15 MIN (STAT)

## 2022-04-15 PROCEDURE — 99900031 HC PATIENT EDUCATION (STAT)

## 2022-04-15 PROCEDURE — 99284 EMERGENCY DEPT VISIT MOD MDM: CPT | Mod: 25

## 2022-04-15 PROCEDURE — 71101 XR RIBS MIN 3 VIEWS W/ PA CHEST LEFT: ICD-10-PCS | Mod: 26,LT,, | Performed by: RADIOLOGY

## 2022-04-15 PROCEDURE — 63600175 PHARM REV CODE 636 W HCPCS: Performed by: NURSE PRACTITIONER

## 2022-04-15 PROCEDURE — 71101 X-RAY EXAM UNILAT RIBS/CHEST: CPT | Mod: TC,FY,LT

## 2022-04-15 RX ORDER — TRAMADOL HYDROCHLORIDE 50 MG/1
50 TABLET ORAL EVERY 6 HOURS PRN
Qty: 6 TABLET | Refills: 0 | Status: SHIPPED | OUTPATIENT
Start: 2022-04-15 | End: 2022-06-03 | Stop reason: SDUPTHER

## 2022-04-15 RX ORDER — KETOROLAC TROMETHAMINE 30 MG/ML
30 INJECTION, SOLUTION INTRAMUSCULAR; INTRAVENOUS
Status: COMPLETED | OUTPATIENT
Start: 2022-04-15 | End: 2022-04-15

## 2022-04-15 RX ORDER — IBUPROFEN 600 MG/1
600 TABLET ORAL EVERY 6 HOURS PRN
Qty: 20 TABLET | Refills: 0 | Status: SHIPPED | OUTPATIENT
Start: 2022-04-15 | End: 2022-06-03

## 2022-04-15 RX ADMIN — KETOROLAC TROMETHAMINE 30 MG: 30 INJECTION, SOLUTION INTRAMUSCULAR at 04:04

## 2022-04-15 NOTE — ED NOTES
Incentive spirometer was given to pt and educated per resp and pt is confident that she can use device as directed
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
## 2022-04-19 ENCOUNTER — HOSPITAL ENCOUNTER (EMERGENCY)
Facility: HOSPITAL | Age: 32
Discharge: HOME | End: 2022-04-20
Attending: FAMILY MEDICINE
Payer: MEDICAID

## 2022-04-19 ENCOUNTER — PATIENT MESSAGE (OUTPATIENT)
Dept: OBSTETRICS AND GYNECOLOGY | Facility: CLINIC | Age: 32
End: 2022-04-19
Payer: MEDICAID

## 2022-04-19 VITALS
WEIGHT: 160 LBS | TEMPERATURE: 98 F | OXYGEN SATURATION: 99 % | HEIGHT: 64 IN | BODY MASS INDEX: 27.31 KG/M2 | HEART RATE: 106 BPM | RESPIRATION RATE: 16 BRPM

## 2022-04-19 DIAGNOSIS — N76.0 ACUTE VAGINITIS: Primary | ICD-10-CM

## 2022-04-19 PROCEDURE — 99284 EMERGENCY DEPT VISIT MOD MDM: CPT
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
## 2022-04-20 PROCEDURE — 63600175 PHARM REV CODE 636 W HCPCS: Performed by: FAMILY MEDICINE

## 2022-04-20 PROCEDURE — 96372 THER/PROPH/DIAG INJ SC/IM: CPT | Performed by: FAMILY MEDICINE

## 2022-04-20 PROCEDURE — 87491 CHLMYD TRACH DNA AMP PROBE: CPT | Performed by: FAMILY MEDICINE

## 2022-04-20 PROCEDURE — 25000003 PHARM REV CODE 250: Performed by: FAMILY MEDICINE

## 2022-04-20 PROCEDURE — 87591 N.GONORRHOEAE DNA AMP PROB: CPT | Performed by: FAMILY MEDICINE

## 2022-04-20 RX ORDER — DOXYCYCLINE HYCLATE 100 MG
100 TABLET ORAL
Status: COMPLETED | OUTPATIENT
Start: 2022-04-20 | End: 2022-04-20

## 2022-04-20 RX ORDER — DOXYCYCLINE 100 MG/1
100 CAPSULE ORAL 2 TIMES DAILY
Qty: 20 CAPSULE | Refills: 0 | Status: SHIPPED | OUTPATIENT
Start: 2022-04-20 | End: 2022-04-20 | Stop reason: SDUPTHER

## 2022-04-20 RX ORDER — DOXYCYCLINE 100 MG/1
100 CAPSULE ORAL 2 TIMES DAILY
Qty: 20 CAPSULE | Refills: 0 | Status: SHIPPED | OUTPATIENT
Start: 2022-04-20 | End: 2022-04-30

## 2022-04-20 RX ORDER — CEFTRIAXONE 1 G/1
0.5 INJECTION, POWDER, FOR SOLUTION INTRAMUSCULAR; INTRAVENOUS
Status: COMPLETED | OUTPATIENT
Start: 2022-04-20 | End: 2022-04-20

## 2022-04-20 RX ADMIN — DOXYCYCLINE HYCLATE 100 MG: 100 TABLET, COATED ORAL at 12:04

## 2022-04-20 RX ADMIN — CEFTRIAXONE SODIUM 0.5 G: 1 INJECTION, POWDER, FOR SOLUTION INTRAMUSCULAR; INTRAVENOUS at 12:04

## 2022-04-20 NOTE — DISCHARGE INSTRUCTIONS
You are to check with health department or with your primary care concerning culture results in 48-72 hours
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
## 2022-04-21 ENCOUNTER — PATIENT MESSAGE (OUTPATIENT)
Dept: OBSTETRICS AND GYNECOLOGY | Facility: CLINIC | Age: 32
End: 2022-04-21
Payer: MEDICAID

## 2022-04-21 LAB
C TRACH DNA SPEC QL NAA+PROBE: NOT DETECTED
N GONORRHOEA DNA SPEC QL NAA+PROBE: NOT DETECTED

## 2022-05-03 ENCOUNTER — OFFICE VISIT (OUTPATIENT)
Dept: OBSTETRICS AND GYNECOLOGY | Facility: CLINIC | Age: 32
End: 2022-05-03
Payer: MEDICAID

## 2022-05-03 VITALS
BODY MASS INDEX: 27.74 KG/M2 | SYSTOLIC BLOOD PRESSURE: 118 MMHG | WEIGHT: 161.63 LBS | DIASTOLIC BLOOD PRESSURE: 84 MMHG

## 2022-05-03 DIAGNOSIS — T74.91XA DOMESTIC VIOLENCE OF ADULT, INITIAL ENCOUNTER: ICD-10-CM

## 2022-05-03 DIAGNOSIS — Z90.710 S/P LAPAROSCOPIC HYSTERECTOMY: Primary | ICD-10-CM

## 2022-05-03 DIAGNOSIS — N89.8 GRANULATION TISSUE AT VAGINAL VAULT: ICD-10-CM

## 2022-05-03 PROCEDURE — 1159F PR MEDICATION LIST DOCUMENTED IN MEDICAL RECORD: ICD-10-PCS | Mod: CPTII,,, | Performed by: STUDENT IN AN ORGANIZED HEALTH CARE EDUCATION/TRAINING PROGRAM

## 2022-05-03 PROCEDURE — 3074F PR MOST RECENT SYSTOLIC BLOOD PRESSURE < 130 MM HG: ICD-10-PCS | Mod: CPTII,,, | Performed by: STUDENT IN AN ORGANIZED HEALTH CARE EDUCATION/TRAINING PROGRAM

## 2022-05-03 PROCEDURE — 99999 PR PBB SHADOW E&M-EST. PATIENT-LVL III: ICD-10-PCS | Mod: PBBFAC,,, | Performed by: STUDENT IN AN ORGANIZED HEALTH CARE EDUCATION/TRAINING PROGRAM

## 2022-05-03 PROCEDURE — 3008F PR BODY MASS INDEX (BMI) DOCUMENTED: ICD-10-PCS | Mod: CPTII,,, | Performed by: STUDENT IN AN ORGANIZED HEALTH CARE EDUCATION/TRAINING PROGRAM

## 2022-05-03 PROCEDURE — 3008F BODY MASS INDEX DOCD: CPT | Mod: CPTII,,, | Performed by: STUDENT IN AN ORGANIZED HEALTH CARE EDUCATION/TRAINING PROGRAM

## 2022-05-03 PROCEDURE — 99213 OFFICE O/P EST LOW 20 MIN: CPT | Mod: PBBFAC,PN | Performed by: STUDENT IN AN ORGANIZED HEALTH CARE EDUCATION/TRAINING PROGRAM

## 2022-05-03 PROCEDURE — 3074F SYST BP LT 130 MM HG: CPT | Mod: CPTII,,, | Performed by: STUDENT IN AN ORGANIZED HEALTH CARE EDUCATION/TRAINING PROGRAM

## 2022-05-03 PROCEDURE — 3079F PR MOST RECENT DIASTOLIC BLOOD PRESSURE 80-89 MM HG: ICD-10-PCS | Mod: CPTII,,, | Performed by: STUDENT IN AN ORGANIZED HEALTH CARE EDUCATION/TRAINING PROGRAM

## 2022-05-03 PROCEDURE — 3079F DIAST BP 80-89 MM HG: CPT | Mod: CPTII,,, | Performed by: STUDENT IN AN ORGANIZED HEALTH CARE EDUCATION/TRAINING PROGRAM

## 2022-05-03 PROCEDURE — 99999 PR PBB SHADOW E&M-EST. PATIENT-LVL III: CPT | Mod: PBBFAC,,, | Performed by: STUDENT IN AN ORGANIZED HEALTH CARE EDUCATION/TRAINING PROGRAM

## 2022-05-03 PROCEDURE — 99213 PR OFFICE/OUTPT VISIT, EST, LEVL III, 20-29 MIN: ICD-10-PCS | Mod: S$PBB,24,, | Performed by: STUDENT IN AN ORGANIZED HEALTH CARE EDUCATION/TRAINING PROGRAM

## 2022-05-03 PROCEDURE — 1159F MED LIST DOCD IN RCRD: CPT | Mod: CPTII,,, | Performed by: STUDENT IN AN ORGANIZED HEALTH CARE EDUCATION/TRAINING PROGRAM

## 2022-05-03 PROCEDURE — 99213 OFFICE O/P EST LOW 20 MIN: CPT | Mod: S$PBB,24,, | Performed by: STUDENT IN AN ORGANIZED HEALTH CARE EDUCATION/TRAINING PROGRAM

## 2022-05-03 RX ORDER — CONJUGATED ESTROGENS 0.62 MG/G
CREAM VAGINAL
Qty: 45 G | Refills: 12 | Status: SHIPPED | OUTPATIENT
Start: 2022-05-03 | End: 2022-12-21 | Stop reason: SDUPTHER
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
## 2022-05-26 ENCOUNTER — TELEPHONE (OUTPATIENT)
Dept: OBSTETRICS AND GYNECOLOGY | Facility: CLINIC | Age: 32
End: 2022-05-26
Payer: MEDICAID

## 2022-05-26 NOTE — TELEPHONE ENCOUNTER
Left message for Lolita @ [facility] for a return call/fax number for hysterectomy consents.

## 2022-05-26 NOTE — TELEPHONE ENCOUNTER
----- Message from Argenis Figueroa sent at 5/26/2022  9:30 AM CDT -----  Contact: [employer] - Ms. Mchugh  Type: Patient Call Back         Who called: [employer] Ms. Mchugh         What is the request in detail: calling in regards to denied claim for patient; states they would need hysterectomy form filled out ; calling to confirm doctor has form; please advise         Best call back number: [phone] - avail until 3:30pm today [place]         Additional Information: [phone] ; ref# [id number]           Thank You

## 2022-06-03 ENCOUNTER — OFFICE VISIT (OUTPATIENT)
Dept: PRIMARY CARE CLINIC | Facility: CLINIC | Age: 32
End: 2022-06-03
Payer: MEDICAID

## 2022-06-03 VITALS
DIASTOLIC BLOOD PRESSURE: 74 MMHG | OXYGEN SATURATION: 99 % | SYSTOLIC BLOOD PRESSURE: 110 MMHG | HEART RATE: 83 BPM | BODY MASS INDEX: 27.92 KG/M2 | WEIGHT: 167.56 LBS | HEIGHT: 65 IN | RESPIRATION RATE: 19 BRPM

## 2022-06-03 DIAGNOSIS — Z13.220 ENCOUNTER FOR LIPID SCREENING FOR CARDIOVASCULAR DISEASE: ICD-10-CM

## 2022-06-03 DIAGNOSIS — Z00.00 ANNUAL PHYSICAL EXAM: ICD-10-CM

## 2022-06-03 DIAGNOSIS — Z13.6 ENCOUNTER FOR LIPID SCREENING FOR CARDIOVASCULAR DISEASE: ICD-10-CM

## 2022-06-03 DIAGNOSIS — F98.8 ATTENTION DEFICIT DISORDER, UNSPECIFIED HYPERACTIVITY PRESENCE: ICD-10-CM

## 2022-06-03 DIAGNOSIS — F41.9 ANXIETY: ICD-10-CM

## 2022-06-03 DIAGNOSIS — S43.421A SPRAIN OF RIGHT ROTATOR CUFF CAPSULE, INITIAL ENCOUNTER: Primary | ICD-10-CM

## 2022-06-03 DIAGNOSIS — D64.9 ANEMIA, UNSPECIFIED TYPE: ICD-10-CM

## 2022-06-03 PROCEDURE — 1159F PR MEDICATION LIST DOCUMENTED IN MEDICAL RECORD: ICD-10-PCS | Mod: CPTII,,, | Performed by: INTERNAL MEDICINE

## 2022-06-03 PROCEDURE — 1160F RVW MEDS BY RX/DR IN RCRD: CPT | Mod: CPTII,,, | Performed by: INTERNAL MEDICINE

## 2022-06-03 PROCEDURE — 1160F PR REVIEW ALL MEDS BY PRESCRIBER/CLIN PHARMACIST DOCUMENTED: ICD-10-PCS | Mod: CPTII,,, | Performed by: INTERNAL MEDICINE

## 2022-06-03 PROCEDURE — 3074F PR MOST RECENT SYSTOLIC BLOOD PRESSURE < 130 MM HG: ICD-10-PCS | Mod: CPTII,,, | Performed by: INTERNAL MEDICINE

## 2022-06-03 PROCEDURE — 3008F BODY MASS INDEX DOCD: CPT | Mod: CPTII,,, | Performed by: INTERNAL MEDICINE

## 2022-06-03 PROCEDURE — 3078F DIAST BP <80 MM HG: CPT | Mod: CPTII,,, | Performed by: INTERNAL MEDICINE

## 2022-06-03 PROCEDURE — 99999 PR PBB SHADOW E&M-EST. PATIENT-LVL IV: ICD-10-PCS | Mod: PBBFAC,,, | Performed by: INTERNAL MEDICINE

## 2022-06-03 PROCEDURE — 99214 PR OFFICE/OUTPT VISIT, EST, LEVL IV, 30-39 MIN: ICD-10-PCS | Mod: S$PBB,,, | Performed by: INTERNAL MEDICINE

## 2022-06-03 PROCEDURE — 1159F MED LIST DOCD IN RCRD: CPT | Mod: CPTII,,, | Performed by: INTERNAL MEDICINE

## 2022-06-03 PROCEDURE — 99214 OFFICE O/P EST MOD 30 MIN: CPT | Mod: PBBFAC,PN | Performed by: INTERNAL MEDICINE

## 2022-06-03 PROCEDURE — 99999 PR PBB SHADOW E&M-EST. PATIENT-LVL IV: CPT | Mod: PBBFAC,,, | Performed by: INTERNAL MEDICINE

## 2022-06-03 PROCEDURE — 3074F SYST BP LT 130 MM HG: CPT | Mod: CPTII,,, | Performed by: INTERNAL MEDICINE

## 2022-06-03 PROCEDURE — 3008F PR BODY MASS INDEX (BMI) DOCUMENTED: ICD-10-PCS | Mod: CPTII,,, | Performed by: INTERNAL MEDICINE

## 2022-06-03 PROCEDURE — 3078F PR MOST RECENT DIASTOLIC BLOOD PRESSURE < 80 MM HG: ICD-10-PCS | Mod: CPTII,,, | Performed by: INTERNAL MEDICINE

## 2022-06-03 PROCEDURE — 99214 OFFICE O/P EST MOD 30 MIN: CPT | Mod: S$PBB,,, | Performed by: INTERNAL MEDICINE

## 2022-06-03 RX ORDER — TRAZODONE HYDROCHLORIDE 100 MG/1
100 TABLET ORAL NIGHTLY
COMMUNITY
Start: 2022-05-23 | End: 2022-12-12

## 2022-06-03 RX ORDER — DEXAMETHASONE 4 MG/1
4 TABLET ORAL EVERY 12 HOURS
Qty: 20 TABLET | Refills: 0 | Status: SHIPPED | OUTPATIENT
Start: 2022-06-03 | End: 2022-06-13

## 2022-06-03 RX ORDER — TRAMADOL HYDROCHLORIDE 50 MG/1
50 TABLET ORAL EVERY 12 HOURS PRN
Qty: 20 TABLET | Refills: 0 | OUTPATIENT
Start: 2022-06-03 | End: 2022-09-08

## 2022-06-03 RX ORDER — ONDANSETRON 4 MG/1
4 TABLET, FILM COATED ORAL EVERY 8 HOURS PRN
COMMUNITY
Start: 2022-03-23 | End: 2023-07-03 | Stop reason: SDUPTHER

## 2022-06-03 NOTE — PROGRESS NOTES
Subjective:       Patient ID: Rosa Crowley is a 31 y.o. female.    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 Review labs anemia pt is ot pregnant  Review of Systems    Objective:      Physical Exam  Vitals and nursing note reviewed.   Constitutional:       General: She is not in acute distress.     Appearance: She is well-developed.   HENT:      Head: Normocephalic and atraumatic.      Right Ear: External ear normal.      Left Ear: External ear normal.      Nose: Nose normal.      Mouth/Throat:      Pharynx: No oropharyngeal exudate.   Eyes:      Extraocular Movements: Extraocular movements intact.      Conjunctiva/sclera: Conjunctivae normal.      Pupils: Pupils are equal, round, and reactive to light.   Neck:      Thyroid: No thyromegaly.   Cardiovascular:      Rate and Rhythm: Normal rate and regular rhythm.      Heart sounds: Normal heart sounds. No murmur heard.    No friction rub. No gallop.   Pulmonary:      Effort: Pulmonary effort is normal. No respiratory distress.      Breath sounds: Normal breath sounds. No wheezing.   Abdominal:      General: Bowel sounds are normal. There is no distension.      Palpations: Abdomen is soft.      Tenderness: There is no abdominal tenderness.   Musculoskeletal:         General: No tenderness (rt shoulder tenderness around shoulder joint with abduction > 90 degree or extension >20  degree and high rotation) or deformity. Normal range of motion.      Cervical back: Normal range of motion and neck supple.   Lymphadenopathy:      Cervical: No cervical adenopathy.   Skin:     General: Skin is warm and dry.      Findings: No erythema or rash.   Neurological:      Mental Status: She is alert and  oriented to person, place, and time.   Psychiatric:         Mood and Affect: Mood normal.         Thought Content: Thought content normal.         Judgment: Judgment normal.         Assessment:       1. Sprain of right rotator cuff capsule, initial encounter    2. Anxiety    3. Attention deficit disorder, unspecified hyperactivity presence    4. Encounter for lipid screening for cardiovascular disease    5. Annual physical exam    6. Anemia, unspecified type        Plan:       Sprain of right rotator cuff capsule, initial encounter  -     dexAMETHasone (DECADRON) 4 MG Tab; Take 1 tablet (4 mg total) by mouth every 12 (twelve) hours. for 10 days  Dispense: 20 tablet; Refill: 0  -     traMADoL (ULTRAM) 50 mg tablet; Take 1 tablet (50 mg total) by mouth every 12 (twelve) hours as needed for Pain.  Dispense: 20 tablet; Refill: 0  -     Ambulatory referral/consult to Physical/Occupational Therapy; Future; Expected date: 06/10/2022  -     Comprehensive Metabolic Panel; Future; Expected date: 06/05/2022  -     Urinalysis    Anxiety  -     CBC Auto Differential; Future; Expected date: 06/05/2022  -     TSH; Future; Expected date: 06/05/2022  -     T4, Free; Future; Expected date: 06/05/2022    Attention deficit disorder, unspecified hyperactivity presence  Comments:  on treatment by psych  Orders:  -     TSH; Future; Expected date: 06/05/2022  -     T4, Free; Future; Expected date: 06/05/2022    Encounter for lipid screening for cardiovascular disease  -     Lipid Panel; Future; Expected date: 06/05/2022    Annual physical exam  -     CBC Auto Differential; Future; Expected date: 06/05/2022  -     Comprehensive Metabolic Panel; Future; Expected date: 06/05/2022    Anemia, unspecified type  Comments:  if persist need w/u        Medication List with Changes/Refills   New Medications    DEXAMETHASONE (DECADRON) 4 MG TAB    Take 1 tablet (4 mg total) by mouth every 12 (twelve) hours. for 10 days   Current Medications     BUPROPION (WELLBUTRIN XL) 300 MG 24 HR TABLET    Take 450 mg by mouth every evening.    BUTALBITAL-ACETAMINOPHEN-CAFFEINE -40 MG (FIORICET, ESGIC) -40 MG PER TABLET    Take 1 tablet by mouth every 6 (six) hours as needed for Pain or Headaches.    CLONAZEPAM (KLONOPIN) 0.5 MG TABLET    Take 0.5 mg by mouth 3 (three) times daily as needed.    CONJUGATED ESTROGENS (PREMARIN) VAGINAL CREAM    Place a pea-sized amount in vagina every night for 2 weeks, then use 2-3 nights a week    DEXTROAMPHETAMINE-AMPHETAMINE (ADDERALL) 20 MG TABLET    Take by mouth.    ONDANSETRON (ZOFRAN) 4 MG TABLET    Take 4 mg by mouth every 8 (eight) hours as needed.    TRAZODONE (DESYREL) 100 MG TABLET    Take 100 mg by mouth nightly.   Changed and/or Refilled Medications    Modified Medication Previous Medication    TRAMADOL (ULTRAM) 50 MG TABLET traMADoL (ULTRAM) 50 mg tablet       Take 1 tablet (50 mg total) by mouth every 12 (twelve) hours as needed for Pain.    Take 1 tablet (50 mg total) by mouth every 6 (six) hours as needed for Pain.   Discontinued Medications    IBUPROFEN (ADVIL,MOTRIN) 600 MG TABLET    Take 1 tablet (600 mg total) by mouth every 6 (six) hours as needed for Pain.

## 2022-06-03 NOTE — LETTER
Lexii 3, 2022      [facility] [address]  [address], [facility] [address]  [facility] [address]  Phone: [phone]  Fax: [phone]       Patient: Rosa Crowley   YOB: 1990  Date of Visit: 06/03/2022    To Whom It May Concern:    Grant Crowley  was at [facility] on 06/03/2022. The patient may return to work/school in 2 weeks from today with no restrictions. If you have any questions or concerns, or if I can be of further assistance, please do not hesitate to contact me.    Sincerely,    Dr. Selvin Morocho MD

## 2022-06-13 ENCOUNTER — PATIENT MESSAGE (OUTPATIENT)
Dept: OBSTETRICS AND GYNECOLOGY | Facility: CLINIC | Age: 32
End: 2022-06-13
Payer: MEDICAID

## 2022-06-13 NOTE — TELEPHONE ENCOUNTER
Returned pts call and kervin'd a virtual on June 17th at 9:20am to dicuss mood swings and hot flashes.

## 2022-06-27 ENCOUNTER — PATIENT MESSAGE (OUTPATIENT)
Dept: OBSTETRICS AND GYNECOLOGY | Facility: CLINIC | Age: 32
End: 2022-06-27
Payer: MEDICAID

## 2022-06-28 ENCOUNTER — OFFICE VISIT (OUTPATIENT)
Dept: PRIMARY CARE CLINIC | Facility: CLINIC | Age: 32
End: 2022-06-28
Payer: MEDICAID

## 2022-06-28 ENCOUNTER — TELEPHONE (OUTPATIENT)
Dept: PRIMARY CARE CLINIC | Facility: CLINIC | Age: 32
End: 2022-06-28
Payer: MEDICAID

## 2022-06-28 DIAGNOSIS — B34.9 PHARYNGITIS WITH VIRAL SYNDROME: Primary | ICD-10-CM

## 2022-06-28 DIAGNOSIS — J02.9 PHARYNGITIS WITH VIRAL SYNDROME: Primary | ICD-10-CM

## 2022-06-28 PROCEDURE — 1159F MED LIST DOCD IN RCRD: CPT | Mod: CPTII,95,, | Performed by: INTERNAL MEDICINE

## 2022-06-28 PROCEDURE — 99213 OFFICE O/P EST LOW 20 MIN: CPT | Mod: 95,,, | Performed by: INTERNAL MEDICINE

## 2022-06-28 PROCEDURE — 1159F PR MEDICATION LIST DOCUMENTED IN MEDICAL RECORD: ICD-10-PCS | Mod: CPTII,95,, | Performed by: INTERNAL MEDICINE

## 2022-06-28 PROCEDURE — 1160F RVW MEDS BY RX/DR IN RCRD: CPT | Mod: CPTII,95,, | Performed by: INTERNAL MEDICINE

## 2022-06-28 PROCEDURE — 99213 PR OFFICE/OUTPT VISIT, EST, LEVL III, 20-29 MIN: ICD-10-PCS | Mod: 95,,, | Performed by: INTERNAL MEDICINE

## 2022-06-28 PROCEDURE — 1160F PR REVIEW ALL MEDS BY PRESCRIBER/CLIN PHARMACIST DOCUMENTED: ICD-10-PCS | Mod: CPTII,95,, | Performed by: INTERNAL MEDICINE

## 2022-06-28 RX ORDER — DEXAMETHASONE 4 MG/1
4 TABLET ORAL EVERY 12 HOURS
Qty: 10 TABLET | Refills: 0 | Status: SHIPPED | OUTPATIENT
Start: 2022-06-28 | End: 2023-02-28 | Stop reason: CLARIF

## 2022-06-28 RX ORDER — AZITHROMYCIN 250 MG/1
TABLET, FILM COATED ORAL
Qty: 6 TABLET | Refills: 0 | Status: SHIPPED | OUTPATIENT
Start: 2022-06-28 | End: 2022-07-02

## 2022-06-28 RX ORDER — BENZONATATE 200 MG/1
200 CAPSULE ORAL 3 TIMES DAILY PRN
Qty: 30 CAPSULE | Refills: 0 | Status: SHIPPED | OUTPATIENT
Start: 2022-06-28 | End: 2022-07-08

## 2022-06-28 NOTE — TELEPHONE ENCOUNTER
----- Message from Aarti Schilling sent at 6/28/2022  8:50 AM CDT -----  Contact: [phone] Patient  Patient would like to get medical advice.  Symptoms (please be specific):   headaches, sore throat, slight cough and fever  How long have you had these symptoms: 06/26  Would you like a call back, or a response through your [facility] portal?:   either  Pharmacy name and phone # (copy from chart):     [id number] - [place] - [address] [facility] [address]  [address] [address]  Phone: [phone] Fax: [phone]   Comments:

## 2022-06-28 NOTE — TELEPHONE ENCOUNTER
Pt. Tested negative for COVID using at home test. Headache started Sunday, fever started last night. Pt. C/o HA, cough and congestion.

## 2022-06-28 NOTE — PROGRESS NOTES
The patient location is: home  The chief complaint leading to consultation is: fever sorethroat body ache coughing    Visit type: audio only   Subjective:       Patient ID: Rosa Crowley is a 31 y.o. female.    Chief Complaint: No chief complaint on file.    HPI  Pt visit today with c/o HA on Sunday fever yesterday and now sorethroat coughing a lot bodyach chill no n/v/d pt is not pregnant she had hysterectomy recently no other c/o. She had covid vaccine x 3 and did home covid test negative  Review of Systems    Objective:      Physical Exam  pt is not in acute distress coherent no sob but sound very congested coughing sorethroat  Assessment:       1. Pharyngitis with viral syndrome        Plan:       Pharyngitis with viral syndrome  -     azithromycin (Z-JUDITH) 250 MG tablet; 2 tabs by mouth day 1, then 1 tab by mouth daily x 4 days  Dispense: 6 tablet; Refill: 0  -     dexAMETHasone (DECADRON) 4 MG Tab; Take 1 tablet (4 mg total) by mouth every 12 (twelve) hours.  Dispense: 10 tablet; Refill: 0  -     benzonatate (TESSALON) 200 MG capsule; Take 1 capsule (200 mg total) by mouth 3 (three) times daily as needed for Cough.  Dispense: 30 capsule; Refill: 0        Medication List with Changes/Refills   New Medications    AZITHROMYCIN (Z-JUDITH) 250 MG TABLET    2 tabs by mouth day 1, then 1 tab by mouth daily x 4 days    BENZONATATE (TESSALON) 200 MG CAPSULE    Take 1 capsule (200 mg total) by mouth 3 (three) times daily as needed for Cough.    DEXAMETHASONE (DECADRON) 4 MG TAB    Take 1 tablet (4 mg total) by mouth every 12 (twelve) hours.   Current Medications    BUPROPION (WELLBUTRIN XL) 300 MG 24 HR TABLET    Take 450 mg by mouth every evening.    BUTALBITAL-ACETAMINOPHEN-CAFFEINE -40 MG (FIORICET, ESGIC) -40 MG PER TABLET    Take 1 tablet by mouth every 6 (six) hours as needed for Pain or Headaches.    CLONAZEPAM (KLONOPIN) 0.5 MG TABLET    Take 0.5 mg by mouth 3 (three) times daily as  needed.    CONJUGATED ESTROGENS (PREMARIN) VAGINAL CREAM    Place a pea-sized amount in vagina every night for 2 weeks, then use 2-3 nights a week    DEXTROAMPHETAMINE-AMPHETAMINE (ADDERALL) 20 MG TABLET    Take by mouth.    ONDANSETRON (ZOFRAN) 4 MG TABLET    Take 4 mg by mouth every 8 (eight) hours as needed.    TRAMADOL (ULTRAM) 50 MG TABLET    Take 1 tablet (50 mg total) by mouth every 12 (twelve) hours as needed for Pain.    TRAZODONE (DESYREL) 100 MG TABLET    Take 100 mg by mouth nightly.        Face to Face time with patient: 15   minutes of total time spent on the encounter, which includes face to face time and non-face to face time preparing to see the patient (eg, review of tests), Obtaining and/or reviewing separately obtained history, Documenting clinical information in the electronic or other health record, Independently interpreting results (not separately reported) and communicating results to the patient/family/caregiver, or Care coordination (not separately reported).         Each patient to whom he or she provides medical services by telemedicine is:  (1) informed of the relationship between the physician and patient and the respective role of any other health care provider with respect to management of the patient; and (2) notified that he or she may decline to receive medical services by telemedicine and may withdraw from such care at any time.    Notes:

## 2022-07-12 ENCOUNTER — OFFICE VISIT (OUTPATIENT)
Dept: OBSTETRICS AND GYNECOLOGY | Facility: CLINIC | Age: 32
End: 2022-07-12
Payer: MEDICAID

## 2022-07-12 DIAGNOSIS — N95.1 MENOPAUSAL SYMPTOMS: Primary | ICD-10-CM

## 2022-07-12 PROCEDURE — 99213 OFFICE O/P EST LOW 20 MIN: CPT | Mod: 95,,, | Performed by: STUDENT IN AN ORGANIZED HEALTH CARE EDUCATION/TRAINING PROGRAM

## 2022-07-12 PROCEDURE — 99213 PR OFFICE/OUTPT VISIT, EST, LEVL III, 20-29 MIN: ICD-10-PCS | Mod: 95,,, | Performed by: STUDENT IN AN ORGANIZED HEALTH CARE EDUCATION/TRAINING PROGRAM

## 2022-07-12 RX ORDER — ESTRADIOL 0.1 MG/D
1 FILM, EXTENDED RELEASE TRANSDERMAL
Qty: 8 PATCH | Refills: 11 | Status: SHIPPED | OUTPATIENT
Start: 2022-07-14 | End: 2023-08-30 | Stop reason: SDUPTHER
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
## 2022-08-04 ENCOUNTER — PATIENT MESSAGE (OUTPATIENT)
Dept: OBSTETRICS AND GYNECOLOGY | Facility: CLINIC | Age: 32
End: 2022-08-04
Payer: MEDICAID

## 2022-08-04 DIAGNOSIS — N76.2 ACUTE VULVITIS: Primary | ICD-10-CM

## 2022-08-04 RX ORDER — TRIAMCINOLONE ACETONIDE 5 MG/G
CREAM TOPICAL 3 TIMES DAILY PRN
Qty: 30 G | Refills: 1 | Status: SHIPPED | OUTPATIENT
Start: 2022-08-04 | End: 2023-06-29

## 2022-08-12 ENCOUNTER — PATIENT MESSAGE (OUTPATIENT)
Dept: PRIMARY CARE CLINIC | Facility: CLINIC | Age: 32
End: 2022-08-12
Payer: MEDICAID

## 2022-09-19 ENCOUNTER — PATIENT MESSAGE (OUTPATIENT)
Dept: PRIMARY CARE CLINIC | Facility: CLINIC | Age: 32
End: 2022-09-19
Payer: MEDICAID

## 2022-09-20 ENCOUNTER — TELEPHONE (OUTPATIENT)
Dept: PRIMARY CARE CLINIC | Facility: CLINIC | Age: 32
End: 2022-09-20

## 2022-09-20 NOTE — TELEPHONE ENCOUNTER
----- Message from Salome Palacios sent at 9/20/2022 11:13 AM CDT -----  Contact: Self/[phone]  Pt said that she is calling in regards to the packing where she had the cyst removed came out pt stated that she cannot come in for her appt that she has scheduled for today and needs to know what to do . Please advise

## 2022-09-27 ENCOUNTER — PATIENT MESSAGE (OUTPATIENT)
Dept: PRIMARY CARE CLINIC | Facility: CLINIC | Age: 32
End: 2022-09-27
Payer: MEDICAID

## 2022-09-29 ENCOUNTER — PATIENT MESSAGE (OUTPATIENT)
Dept: PRIMARY CARE CLINIC | Facility: CLINIC | Age: 32
End: 2022-09-29
Payer: MEDICAID

## 2022-09-30 ENCOUNTER — OFFICE VISIT (OUTPATIENT)
Dept: PRIMARY CARE CLINIC | Facility: CLINIC | Age: 32
End: 2022-09-30
Payer: MEDICAID

## 2022-09-30 DIAGNOSIS — G43.809 OTHER MIGRAINE WITHOUT STATUS MIGRAINOSUS, NOT INTRACTABLE: Primary | ICD-10-CM

## 2022-09-30 DIAGNOSIS — R11.0 NAUSEA: ICD-10-CM

## 2022-09-30 PROCEDURE — 99213 OFFICE O/P EST LOW 20 MIN: CPT | Mod: 95,,, | Performed by: INTERNAL MEDICINE

## 2022-09-30 PROCEDURE — 1159F MED LIST DOCD IN RCRD: CPT | Mod: CPTII,95,, | Performed by: INTERNAL MEDICINE

## 2022-09-30 PROCEDURE — 1159F PR MEDICATION LIST DOCUMENTED IN MEDICAL RECORD: ICD-10-PCS | Mod: CPTII,95,, | Performed by: INTERNAL MEDICINE

## 2022-09-30 PROCEDURE — 1160F RVW MEDS BY RX/DR IN RCRD: CPT | Mod: CPTII,95,, | Performed by: INTERNAL MEDICINE

## 2022-09-30 PROCEDURE — 99213 PR OFFICE/OUTPT VISIT, EST, LEVL III, 20-29 MIN: ICD-10-PCS | Mod: 95,,, | Performed by: INTERNAL MEDICINE

## 2022-09-30 PROCEDURE — 1160F PR REVIEW ALL MEDS BY PRESCRIBER/CLIN PHARMACIST DOCUMENTED: ICD-10-PCS | Mod: CPTII,95,, | Performed by: INTERNAL MEDICINE

## 2022-10-03 ENCOUNTER — PATIENT MESSAGE (OUTPATIENT)
Dept: PRIMARY CARE CLINIC | Facility: CLINIC | Age: 32
End: 2022-10-03
Payer: MEDICAID

## 2022-10-03 RX ORDER — BUTALBITAL, ACETAMINOPHEN AND CAFFEINE 50; 325; 40 MG/1; MG/1; MG/1
1 TABLET ORAL 3 TIMES DAILY PRN
Qty: 30 TABLET | Refills: 0 | Status: SHIPPED | OUTPATIENT
Start: 2022-10-03 | End: 2022-11-02

## 2022-10-03 RX ORDER — ONDANSETRON 8 MG/1
8 TABLET, ORALLY DISINTEGRATING ORAL EVERY 6 HOURS PRN
Qty: 10 TABLET | Refills: 1 | Status: SHIPPED | OUTPATIENT
Start: 2022-10-03 | End: 2023-06-29

## 2022-10-03 NOTE — PROGRESS NOTES
Subjective:    The patient location is: home  The chief complaint leading to consultation is: headache nausea    Visit type: audiovisual    Face to Face time with patient: 12   minutes of total time spent on the encounter, which includes face to face time and non-face to face time preparing to see the patient (eg, review of tests), Obtaining and/or reviewing separately obtained history, Documenting clinical information in the electronic or other health record, Independently interpreting results (not separately reported) and communicating results to the patient/family/caregiver, or Care coordination (not separately reported).         Each patient to whom he or she provides medical services by telemedicine is:  (1) informed of the relationship between the physician and patient and the respective role of any other health care provider with respect to management of the patient; and (2) notified that he or she may decline to receive medical services by telemedicine and may withdraw from such care at any time.    Notes:     Patient ID: Rosa Crowley is a 31 y.o. female.    Chief Complaint: No chief complaint on file.    HPI  Pt visit today with c/o HA since Wednesday took tylenol ibuprofen not helping had to leave work sshe think her BP is ok will try get it done when get hoem and call us with result Pt had migrain HA in past no fever chill no t pregnant no neurological symptoms  Review of Systems    Objective:      Physical Exam  Constitutional:       General: She is not in acute distress.  HENT:      Head: Atraumatic.   Eyes:      Extraocular Movements: Extraocular movements intact.   Pulmonary:      Effort: Pulmonary effort is normal.   Neurological:      Mental Status: She is oriented to person, place, and time.   Psychiatric:         Mood and Affect: Mood normal.         Thought Content: Thought content normal.         Judgment: Judgment normal.       Assessment:       1. Other migraine without status  migrainosus, not intractable    2. Nausea          Plan:       Other migraine without status migrainosus, not intractable  -     butalbital-acetaminophen-caffeine -40 mg (FIORICET, ESGIC) -40 mg per tablet; Take 1 tablet by mouth 3 (three) times daily as needed for Headaches.  Dispense: 30 tablet; Refill: 0    Nausea  -     ondansetron (ZOFRAN-ODT) 8 MG TbDL; Take 1 tablet (8 mg total) by mouth every 6 (six) hours as needed (nausea).  Dispense: 10 tablet; Refill: 1        Medication List with Changes/Refills   New Medications    BUTALBITAL-ACETAMINOPHEN-CAFFEINE -40 MG (FIORICET, ESGIC) -40 MG PER TABLET    Take 1 tablet by mouth 3 (three) times daily as needed for Headaches.    ONDANSETRON (ZOFRAN-ODT) 8 MG TBDL    Take 1 tablet (8 mg total) by mouth every 6 (six) hours as needed (nausea).   Current Medications    BUPROPION (WELLBUTRIN XL) 300 MG 24 HR TABLET    Take 450 mg by mouth every evening.    CLONAZEPAM (KLONOPIN) 0.5 MG TABLET    Take 0.5 mg by mouth 3 (three) times daily as needed.    CONJUGATED ESTROGENS (PREMARIN) VAGINAL CREAM    Place a pea-sized amount in vagina every night for 2 weeks, then use 2-3 nights a week    DEXAMETHASONE (DECADRON) 4 MG TAB    Take 1 tablet (4 mg total) by mouth every 12 (twelve) hours.    DEXTROAMPHETAMINE-AMPHETAMINE (ADDERALL) 20 MG TABLET    Take by mouth.    ESTRADIOL (VIVELLE-DOT) 0.1 MG/24 HR PTSW    Place 1 patch onto the skin twice a week.    ONDANSETRON (ZOFRAN) 4 MG TABLET    Take 4 mg by mouth every 8 (eight) hours as needed.    TRAMADOL (ULTRAM) 50 MG TABLET    Take 1 tablet (50 mg total) by mouth every 6 (six) hours as needed for Pain. The medication you have been prescribed may cause drowsiness and impair your judgement.  Therefore, you should avoid driving, climbing, using machinery, etc., so as not to increase your risk of injury.  Do NOT drink any alcohol while on this medication(s). It is also addictive.    TRAZODONE (DESYREL) 100  MG TABLET    Take 100 mg by mouth nightly.    TRIAMCINOLONE ACETONIDE 0.5% (KENALOG) 0.5 % CREA    Apply topically 3 (three) times daily as needed.   Discontinued Medications    BUTALBITAL-ACETAMINOPHEN-CAFFEINE -40 MG (FIORICET, ESGIC) -40 MG PER TABLET    Take 1 tablet by mouth every 6 (six) hours as needed for Pain or Headaches.

## 2022-10-31 ENCOUNTER — PATIENT MESSAGE (OUTPATIENT)
Dept: PRIMARY CARE CLINIC | Facility: CLINIC | Age: 32
End: 2022-10-31
Payer: MEDICAID

## 2022-10-31 DIAGNOSIS — N39.0 CHRONIC UTI: Primary | ICD-10-CM

## 2022-11-22 NOTE — PROGRESS NOTES
"Subjective:   Rosa Crowley is a 32 y.o. female who presents for evaluation of [place]      Recurrent UTIs  Patient complains of recurrent urinary tract infections.  These have been occuring for 2 years and she reports 2 infections in the past year.  Her most recent infection was in October of this year.  Of her recent infections, 2 are culture proven, including E. coli and enterococcus .  She describes associated symptoms during these episodes as burning with urination, frequency, and urgency.  She denies associated fever and flank pain.  She reports that symptoms improve with antibiotic treatment, which have included Bactrim, Ceftin, and Nitrofurantoin.  She is is sexually active with 1 partners.  The timing of her infections is not related to intercourse.  Other treatments have included none  pyridium .  Other urologic history includes childhood UTIs. Reports having cystoscope as child with "blisters" noted in bladder.   She is s/p hysterectomy 3/2022; currently using estrogen patch.     The following portions of the patient's history were reviewed and updated as appropriate: allergies, current medications, past family history, past medical history, past social history, past surgical history and problem list.    Review of Systems  Constitutional: no fever or chills  ENT: no nasal congestion or sore throat  Respiratory: no cough or shortness of breath  Cardiovascular: no chest pain or palpitations  Gastrointestinal: no nausea or vomiting, tolerating diet  Genitourinary: as per HPI  Hematologic/Lymphatic: no easy bruising or lymphadenopathy  Musculoskeletal: no arthralgias or myalgias  Neurological: no seizures or tremors  Behavioral/Psych: no auditory or visual hallucinations     Objective:   Vital Signs:/78 (BP Location: Right arm, Patient Position: Sitting, BP Method: Small (Automatic))   Pulse 87   Ht 5' 4" (1.626 m)   Wt 79.1 kg (174 lb 6.1 oz)   LMP 02/10/2022   BMI 29.93 kg/m² "     Physical Exam   General: alert and oriented, no acute distress  Head: normocephalic, atraumatic  Neck: supple, no lymphadenopathy, normal ROM, no masses  Respiratory: Symmetric expansion, non-labored breathing  Cardiovascular: regular rate and rhythm, nomal pulses, no peripheral edema  Abdomen: soft, non tender, non distended  Lymphatic: no inguinal nodes  Skin: normal coloration and turgor, no rashes, no suspicious skin lesions noted  Neuro: alert and oriented x3, no gross deficits  Psych: normal judgment and insight, normal mood/affect, and non-anxious    Lab Review     Urinalysis demonstrates no specimen   Component      Latest Ref Rng & Units 10/15/2022   Urine Culture, Routine       ENTEROCOCCUS FAECALIS (A) . . .     Component      Latest Ref Rng & Units 9/8/2022   Urine Culture, Routine       ESCHERICHIA COLI (A) . . .     Lab Results   Component Value Date    WBC 6.00 07/21/2021    HGB 12.7 07/21/2021    HCT 37.8 07/21/2021    MCV 89 07/21/2021     07/21/2021     Lab Results   Component Value Date    CREATININE 0.8 07/21/2021    BUN 11 07/21/2021       Imaging   CTAP 2020 5mm right renal cyst     Assessment and Plan:   1. Chronic UTI  --We discussed how to prevent UTIs; tip sheet provided   --Standing UC order placed; supplied given   --RBUS NA    2. Bladder pain   - Discussed what is known/unknown about IC/PBS, benefits/risks to available treatments, no signle treatement has been found effective for the majority of pts, and symptom control may require several trials of multiple treatment options.   - First line therapies: IC diet, avoid bladder irritants, stress management   - Second line therapies: PFPT (avoid Kegels), pharmacology (Elmiron, Elavil, Vistaril - also consider vaginal Valium, Neurontin), bladder instillations.   - Third line therapies: cystoscopy with hydrodistension, fulguration of Hunner's lesions if present   - Fourth line therapies: Botox, InterStim   - Fifth/Sixth line  therapies: cyclosporine/cystectomy - would require referral    - URIBEL 118-10-40.8-36 mg Cap; Take 1 capsule by mouth 4 (four) times daily as needed (bladder pain).  Dispense: 30 capsule; Refill: 4  - Urine culture  - Mycoplasma genitalium Molecular Detection, PCR Urine  - Ureaplasma PCR Urine  - Urinalysis Microscopic       --will notify with results; follow up after US          This note is dictated on M*Modal word recognition program.  There are word recognition mistakes that are occasionally missed on review.

## 2022-11-23 ENCOUNTER — OFFICE VISIT (OUTPATIENT)
Dept: UROLOGY | Facility: CLINIC | Age: 32
End: 2022-11-23
Payer: MEDICAID

## 2022-11-23 ENCOUNTER — PATIENT MESSAGE (OUTPATIENT)
Dept: UROLOGY | Facility: CLINIC | Age: 32
End: 2022-11-23

## 2022-11-23 VITALS
WEIGHT: 174.38 LBS | HEART RATE: 87 BPM | SYSTOLIC BLOOD PRESSURE: 107 MMHG | DIASTOLIC BLOOD PRESSURE: 78 MMHG | HEIGHT: 64 IN | BODY MASS INDEX: 29.77 KG/M2

## 2022-11-23 DIAGNOSIS — R39.89 BLADDER PAIN: Primary | ICD-10-CM

## 2022-11-23 DIAGNOSIS — N39.0 CHRONIC UTI: ICD-10-CM

## 2022-11-23 LAB
AMORPH CRY URNS QL MICRO: ABNORMAL
BACTERIA #/AREA URNS HPF: ABNORMAL /HPF
BILIRUB SERPL-MCNC: ABNORMAL MG/DL
BLOOD URINE, POC: NEGATIVE
CLARITY, POC UA: CLEAR
COLOR, POC UA: YELLOW
GLUCOSE UR QL STRIP: NEGATIVE
KETONES UR QL STRIP: NEGATIVE
LEUKOCYTE ESTERASE URINE, POC: NEGATIVE
MICROSCOPIC COMMENT: ABNORMAL
NITRITE, POC UA: NEGATIVE
PH, POC UA: 6
PROTEIN, POC: ABNORMAL
RBC #/AREA URNS HPF: 0 /HPF (ref 0–4)
SPECIFIC GRAVITY, POC UA: 1.02
SQUAMOUS #/AREA URNS HPF: 2 /HPF
UROBILINOGEN, POC UA: NORMAL
WBC #/AREA URNS HPF: 0 /HPF (ref 0–5)

## 2022-11-23 PROCEDURE — 99204 PR OFFICE/OUTPT VISIT, NEW, LEVL IV, 45-59 MIN: ICD-10-PCS | Mod: S$PBB,,, | Performed by: NURSE PRACTITIONER

## 2022-11-23 PROCEDURE — 81002 URINALYSIS NONAUTO W/O SCOPE: CPT | Mod: PBBFAC,PN | Performed by: NURSE PRACTITIONER

## 2022-11-23 PROCEDURE — 87563 M. GENITALIUM AMP PROBE: CPT | Performed by: NURSE PRACTITIONER

## 2022-11-23 PROCEDURE — 3074F SYST BP LT 130 MM HG: CPT | Mod: CPTII,,, | Performed by: NURSE PRACTITIONER

## 2022-11-23 PROCEDURE — 1159F PR MEDICATION LIST DOCUMENTED IN MEDICAL RECORD: ICD-10-PCS | Mod: CPTII,,, | Performed by: NURSE PRACTITIONER

## 2022-11-23 PROCEDURE — 99999 PR PBB SHADOW E&M-EST. PATIENT-LVL V: CPT | Mod: PBBFAC,,, | Performed by: NURSE PRACTITIONER

## 2022-11-23 PROCEDURE — 3008F BODY MASS INDEX DOCD: CPT | Mod: CPTII,,, | Performed by: NURSE PRACTITIONER

## 2022-11-23 PROCEDURE — 87086 URINE CULTURE/COLONY COUNT: CPT | Performed by: NURSE PRACTITIONER

## 2022-11-23 PROCEDURE — 3078F DIAST BP <80 MM HG: CPT | Mod: CPTII,,, | Performed by: NURSE PRACTITIONER

## 2022-11-23 PROCEDURE — 1160F PR REVIEW ALL MEDS BY PRESCRIBER/CLIN PHARMACIST DOCUMENTED: ICD-10-PCS | Mod: CPTII,,, | Performed by: NURSE PRACTITIONER

## 2022-11-23 PROCEDURE — 99215 OFFICE O/P EST HI 40 MIN: CPT | Mod: PBBFAC,PN | Performed by: NURSE PRACTITIONER

## 2022-11-23 PROCEDURE — 87798 DETECT AGENT NOS DNA AMP: CPT | Performed by: NURSE PRACTITIONER

## 2022-11-23 PROCEDURE — 3074F PR MOST RECENT SYSTOLIC BLOOD PRESSURE < 130 MM HG: ICD-10-PCS | Mod: CPTII,,, | Performed by: NURSE PRACTITIONER

## 2022-11-23 PROCEDURE — 1159F MED LIST DOCD IN RCRD: CPT | Mod: CPTII,,, | Performed by: NURSE PRACTITIONER

## 2022-11-23 PROCEDURE — 99204 OFFICE O/P NEW MOD 45 MIN: CPT | Mod: S$PBB,,, | Performed by: NURSE PRACTITIONER

## 2022-11-23 PROCEDURE — 99999 PR PBB SHADOW E&M-EST. PATIENT-LVL V: ICD-10-PCS | Mod: PBBFAC,,, | Performed by: NURSE PRACTITIONER

## 2022-11-23 PROCEDURE — 3078F PR MOST RECENT DIASTOLIC BLOOD PRESSURE < 80 MM HG: ICD-10-PCS | Mod: CPTII,,, | Performed by: NURSE PRACTITIONER

## 2022-11-23 PROCEDURE — 1160F RVW MEDS BY RX/DR IN RCRD: CPT | Mod: CPTII,,, | Performed by: NURSE PRACTITIONER

## 2022-11-23 PROCEDURE — 3008F PR BODY MASS INDEX (BMI) DOCUMENTED: ICD-10-PCS | Mod: CPTII,,, | Performed by: NURSE PRACTITIONER

## 2022-11-23 RX ORDER — METHENAMINE, SODIUM PHOSPHATE, MONOBASIC, MONOHYDRATE, PHENYL SALICYLATE, METHYLENE BLUE, AND HYOSCYAMINE SULFATE 118; 40.8; 36; 10; .12 MG/1; MG/1; MG/1; MG/1; MG/1
1 CAPSULE ORAL 4 TIMES DAILY PRN
Qty: 30 CAPSULE | Refills: 4 | Status: SHIPPED | OUTPATIENT
Start: 2022-11-23 | End: 2023-01-12 | Stop reason: SDUPTHER

## 2022-11-23 RX ORDER — OXCARBAZEPINE 150 MG/1
TABLET, FILM COATED ORAL
COMMUNITY
Start: 2022-11-18 | End: 2023-01-10 | Stop reason: SDUPTHER

## 2022-11-25 LAB — BACTERIA UR CULT: NO GROWTH

## 2022-11-26 LAB
M GENITALIUM DNA UR QL NAA+PROBE: NEGATIVE
SPECIMEN SOURCE: NORMAL

## 2022-11-28 ENCOUNTER — PATIENT MESSAGE (OUTPATIENT)
Dept: UROLOGY | Facility: CLINIC | Age: 32
End: 2022-11-28
Payer: MEDICAID

## 2022-11-28 DIAGNOSIS — N34.1 NGU DUE TO UREAPLASMA UREALYTICUM: Primary | ICD-10-CM

## 2022-11-28 DIAGNOSIS — A49.3 NGU DUE TO UREAPLASMA UREALYTICUM: Primary | ICD-10-CM

## 2022-11-28 LAB
SPECIMEN SOURCE: ABNORMAL
U PARVUM DNA SPEC QL NAA+PROBE: POSITIVE
U UREALYTICUM DNA SPEC QL NAA+PROBE: NEGATIVE

## 2022-11-28 RX ORDER — DOXYCYCLINE 100 MG/1
100 CAPSULE ORAL 2 TIMES DAILY
Qty: 20 CAPSULE | Refills: 0 | Status: SHIPPED | OUTPATIENT
Start: 2022-11-28 | End: 2023-02-28 | Stop reason: CLARIF

## 2022-12-05 ENCOUNTER — PATIENT MESSAGE (OUTPATIENT)
Dept: PRIMARY CARE CLINIC | Facility: CLINIC | Age: 32
End: 2022-12-05

## 2022-12-05 ENCOUNTER — OFFICE VISIT (OUTPATIENT)
Dept: PRIMARY CARE CLINIC | Facility: CLINIC | Age: 32
End: 2022-12-05
Payer: MEDICAID

## 2022-12-05 ENCOUNTER — PATIENT MESSAGE (OUTPATIENT)
Dept: OBSTETRICS AND GYNECOLOGY | Facility: CLINIC | Age: 32
End: 2022-12-05
Payer: MEDICAID

## 2022-12-05 DIAGNOSIS — J40 BRONCHITIS: ICD-10-CM

## 2022-12-05 DIAGNOSIS — R51.9 NONINTRACTABLE HEADACHE, UNSPECIFIED CHRONICITY PATTERN, UNSPECIFIED HEADACHE TYPE: ICD-10-CM

## 2022-12-05 DIAGNOSIS — J01.90 ACUTE NON-RECURRENT SINUSITIS, UNSPECIFIED LOCATION: Primary | ICD-10-CM

## 2022-12-05 PROCEDURE — 1159F MED LIST DOCD IN RCRD: CPT | Mod: CPTII,95,, | Performed by: INTERNAL MEDICINE

## 2022-12-05 PROCEDURE — 1159F PR MEDICATION LIST DOCUMENTED IN MEDICAL RECORD: ICD-10-PCS | Mod: CPTII,95,, | Performed by: INTERNAL MEDICINE

## 2022-12-05 PROCEDURE — 1160F PR REVIEW ALL MEDS BY PRESCRIBER/CLIN PHARMACIST DOCUMENTED: ICD-10-PCS | Mod: CPTII,95,, | Performed by: INTERNAL MEDICINE

## 2022-12-05 PROCEDURE — 99213 OFFICE O/P EST LOW 20 MIN: CPT | Mod: 95,,, | Performed by: INTERNAL MEDICINE

## 2022-12-05 PROCEDURE — 1160F RVW MEDS BY RX/DR IN RCRD: CPT | Mod: CPTII,95,, | Performed by: INTERNAL MEDICINE

## 2022-12-05 PROCEDURE — 99213 PR OFFICE/OUTPT VISIT, EST, LEVL III, 20-29 MIN: ICD-10-PCS | Mod: 95,,, | Performed by: INTERNAL MEDICINE

## 2022-12-05 RX ORDER — BENZONATATE 200 MG/1
200 CAPSULE ORAL 3 TIMES DAILY PRN
Qty: 30 CAPSULE | Refills: 0 | Status: SHIPPED | OUTPATIENT
Start: 2022-12-05 | End: 2022-12-15

## 2022-12-05 RX ORDER — IBUPROFEN 800 MG/1
800 TABLET ORAL 3 TIMES DAILY
Qty: 45 TABLET | Refills: 1 | Status: SHIPPED | OUTPATIENT
Start: 2022-12-05 | End: 2023-06-28 | Stop reason: SDUPTHER

## 2022-12-05 RX ORDER — ALBUTEROL SULFATE 90 UG/1
2 AEROSOL, METERED RESPIRATORY (INHALATION) EVERY 4 HOURS PRN
Qty: 18 G | Refills: 2 | Status: SHIPPED | OUTPATIENT
Start: 2022-12-05 | End: 2023-02-28 | Stop reason: CLARIF

## 2022-12-05 RX ORDER — BUTALBITAL, ACETAMINOPHEN AND CAFFEINE 50; 325; 40 MG/1; MG/1; MG/1
1 TABLET ORAL 2 TIMES DAILY PRN
Qty: 30 TABLET | Refills: 0 | Status: SHIPPED | OUTPATIENT
Start: 2022-12-05 | End: 2023-01-10

## 2022-12-05 NOTE — PROGRESS NOTES
Subjective:    The patient location is: home  The chief complaint leading to consultation is: coughing body ache sob wheezing    Visit type: audiovisual    Face to Face time with patient: 15   minutes of total time spent on the encounter, which includes face to face time and non-face to face time preparing to see the patient (eg, review of tests), Obtaining and/or reviewing separately obtained history, Documenting clinical information in the electronic or other health record, Independently interpreting results (not separately reported) and communicating results to the patient/family/caregiver, or Care coordination (not separately reported).         Each patient to whom he or she provides medical services by telemedicine is:  (1) informed of the relationship between the physician and patient and the respective role of any other health care provider with respect to management of the patient; and (2) notified that he or she may decline to receive medical services by telemedicine and may withdraw from such care at any time.    Notes:     Patient ID: Rosa Crowley is a 32 y.o. female.    Chief Complaint: No chief complaint on file.    HPI  Pt visit today c/o sorethroat coughing with phlegm and fever body ache GROSSMAN went to urgent care had flu test negative and covid negative at home she was given Z pk and po prednisone she just  the mdications she also request refill ibuprofen cough medication inhaler and HA medication  Review of Systems    Objective:      Physical Exam  Constitutional:       General: She is not in acute distress.     Appearance: Normal appearance.   HENT:      Head: Atraumatic.   Eyes:      Extraocular Movements: Extraocular movements intact.   Pulmonary:      Effort: Pulmonary effort is normal.   Neurological:      General: No focal deficit present.      Mental Status: She is alert and oriented to person, place, and time.   Psychiatric:         Mood and Affect: Mood normal.          Thought Content: Thought content normal.         Judgment: Judgment normal.       Assessment:       1. Acute non-recurrent sinusitis, unspecified location    2. Bronchitis    3. Nonintractable headache, unspecified chronicity pattern, unspecified headache type          Plan:       Acute non-recurrent sinusitis, unspecified location  -     albuterol (PROVENTIL/VENTOLIN HFA) 90 mcg/actuation inhaler; Inhale 2 puffs into the lungs every 4 (four) hours as needed for Wheezing or Shortness of Breath. Rescue  Dispense: 18 g; Refill: 2  -     benzonatate (TESSALON) 200 MG capsule; Take 1 capsule (200 mg total) by mouth 3 (three) times daily as needed for Cough.  Dispense: 30 capsule; Refill: 0    Bronchitis  -     albuterol (PROVENTIL/VENTOLIN HFA) 90 mcg/actuation inhaler; Inhale 2 puffs into the lungs every 4 (four) hours as needed for Wheezing or Shortness of Breath. Rescue  Dispense: 18 g; Refill: 2  -     benzonatate (TESSALON) 200 MG capsule; Take 1 capsule (200 mg total) by mouth 3 (three) times daily as needed for Cough.  Dispense: 30 capsule; Refill: 0    Nonintractable headache, unspecified chronicity pattern, unspecified headache type  -     ibuprofen (ADVIL,MOTRIN) 800 MG tablet; Take 1 tablet (800 mg total) by mouth 3 (three) times daily.  Dispense: 45 tablet; Refill: 1  -     butalbital-acetaminophen-caffeine -40 mg (FIORICET, ESGIC) -40 mg per tablet; Take 1 tablet by mouth 2 (two) times daily as needed for Headaches.  Dispense: 30 tablet; Refill: 0        Medication List with Changes/Refills   New Medications    ALBUTEROL (PROVENTIL/VENTOLIN HFA) 90 MCG/ACTUATION INHALER    Inhale 2 puffs into the lungs every 4 (four) hours as needed for Wheezing or Shortness of Breath. Rescue    BENZONATATE (TESSALON) 200 MG CAPSULE    Take 1 capsule (200 mg total) by mouth 3 (three) times daily as needed for Cough.    BUTALBITAL-ACETAMINOPHEN-CAFFEINE -40 MG (FIORICET, ESGIC) -40 MG PER TABLET     Take 1 tablet by mouth 2 (two) times daily as needed for Headaches.    IBUPROFEN (ADVIL,MOTRIN) 800 MG TABLET    Take 1 tablet (800 mg total) by mouth 3 (three) times daily.   Current Medications    BUPROPION (WELLBUTRIN XL) 300 MG 24 HR TABLET    Take 450 mg by mouth every evening.    CLONAZEPAM (KLONOPIN) 0.5 MG TABLET    Take 0.5 mg by mouth 3 (three) times daily as needed.    CONJUGATED ESTROGENS (PREMARIN) VAGINAL CREAM    Place a pea-sized amount in vagina every night for 2 weeks, then use 2-3 nights a week    DEXAMETHASONE (DECADRON) 4 MG TAB    Take 1 tablet (4 mg total) by mouth every 12 (twelve) hours.    DEXTROAMPHETAMINE-AMPHETAMINE (ADDERALL) 20 MG TABLET    Take by mouth.    DOXYCYCLINE (VIBRAMYCIN) 100 MG CAP    Take 1 capsule (100 mg total) by mouth 2 (two) times daily.    ESTRADIOL (VIVELLE-DOT) 0.1 MG/24 HR PTSW    Place 1 patch onto the skin twice a week.    ONDANSETRON (ZOFRAN) 4 MG TABLET    Take 4 mg by mouth every 8 (eight) hours as needed.    ONDANSETRON (ZOFRAN-ODT) 8 MG TBDL    Take 1 tablet (8 mg total) by mouth every 6 (six) hours as needed (nausea).    OXCARBAZEPINE (TRILEPTAL) 150 MG TAB    Take by mouth.    TRAMADOL (ULTRAM) 50 MG TABLET    Take 1 tablet (50 mg total) by mouth every 6 (six) hours as needed for Pain. The medication you have been prescribed may cause drowsiness and impair your judgement.  Therefore, you should avoid driving, climbing, using machinery, etc., so as not to increase your risk of injury.  Do NOT drink any alcohol while on this medication(s). It is also addictive.    TRAZODONE (DESYREL) 100 MG TABLET    Take 100 mg by mouth nightly.    TRIAMCINOLONE ACETONIDE 0.5% (KENALOG) 0.5 % CREA    Apply topically 3 (three) times daily as needed.    URIBEL 118-10-40.8-36 MG CAP    Take 1 capsule by mouth 4 (four) times daily as needed (bladder pain).

## 2022-12-05 NOTE — LETTER
December 5, 2022      [facility] [address], [facility] [address]  [facility] [address]  Phone: [phone]  Fax: [phone]       Patient: Rosa Crowley   YOB: 1990  Date of Visit: 12/05/2022    To Whom It May Concern:    Grant Crowley  was at [facility] on 12/05/2022. The patient may return to work on 11/08/2022 with no restrictions. If you have any questions or concerns, or if I can be of further assistance, please do not hesitate to contact me.    Sincerely,    Selvin Morocho MD

## 2022-12-05 NOTE — LETTER
December 5, 2022      [facility] [id number], [facility] [address]  [facility] [address]  Phone: [phone]  Fax: [phone]       Patient: Rosa Crowley   YOB: 1990  Date of Visit: 12/05/2022    To Whom It May Concern:    Grant Crowley  was at [facility] on 12/05/2022. The patient may return to work on 12/08/2022 with no restrictions. If you have any questions or concerns, or if I can be of further assistance, please do not hesitate to contact me.    Sincerely,    Selvin Morocho MD

## 2022-12-12 ENCOUNTER — OFFICE VISIT (OUTPATIENT)
Dept: UROLOGY | Facility: CLINIC | Age: 32
End: 2022-12-12
Payer: MEDICAID

## 2022-12-12 DIAGNOSIS — N34.1 NGU DUE TO UREAPLASMA UREALYTICUM: Primary | ICD-10-CM

## 2022-12-12 DIAGNOSIS — A49.3 NGU DUE TO UREAPLASMA UREALYTICUM: Primary | ICD-10-CM

## 2022-12-12 DIAGNOSIS — N39.0 CHRONIC UTI: ICD-10-CM

## 2022-12-12 PROCEDURE — 1159F MED LIST DOCD IN RCRD: CPT | Mod: CPTII,95,, | Performed by: NURSE PRACTITIONER

## 2022-12-12 PROCEDURE — 1160F PR REVIEW ALL MEDS BY PRESCRIBER/CLIN PHARMACIST DOCUMENTED: ICD-10-PCS | Mod: CPTII,95,, | Performed by: NURSE PRACTITIONER

## 2022-12-12 PROCEDURE — 99214 OFFICE O/P EST MOD 30 MIN: CPT | Mod: 95,,, | Performed by: NURSE PRACTITIONER

## 2022-12-12 PROCEDURE — 1159F PR MEDICATION LIST DOCUMENTED IN MEDICAL RECORD: ICD-10-PCS | Mod: CPTII,95,, | Performed by: NURSE PRACTITIONER

## 2022-12-12 PROCEDURE — 1160F RVW MEDS BY RX/DR IN RCRD: CPT | Mod: CPTII,95,, | Performed by: NURSE PRACTITIONER

## 2022-12-12 PROCEDURE — 99214 PR OFFICE/OUTPT VISIT, EST, LEVL IV, 30-39 MIN: ICD-10-PCS | Mod: 95,,, | Performed by: NURSE PRACTITIONER

## 2022-12-12 RX ORDER — AMITRIPTYLINE HYDROCHLORIDE 10 MG/1
10 TABLET, FILM COATED ORAL NIGHTLY PRN
Qty: 30 TABLET | Refills: 11 | Status: SHIPPED | OUTPATIENT
Start: 2022-12-12 | End: 2023-12-26 | Stop reason: SDUPTHER

## 2022-12-12 NOTE — PROGRESS NOTES
Established Patient - Audio Only Telehealth Visit     The patient location is: [place]  The chief complaint leading to consultation is: US results   Visit type: Virtual visit with audio only (telephone)  Total time spent with patient: 20 minutes        The reason for the audio only service rather than synchronous audio and video virtual visit was related to technical difficulties or patient preference/necessity.     Each patient to whom I provide medical services by telemedicine is:  (1) informed of the relationship between the physician and patient and the respective role of any other health care provider with respect to management of the patient; and (2) notified that they may decline to receive medical services by telemedicine and may withdraw from such care at any time. Patient verbally consented to receive this service via voice-only telephone call.       HPI: Recently established care  Possible history if IC with Alana  Currently using Uribel for symptom relief; states that she is taking BID and is interested in daily IC medication.   She completed doxy for ureaplasma.    Imaging:  US RETROPERITONEAL COMPLETE     CLINICAL HISTORY:  Urinary tract infection, site not specified     TECHNIQUE:  Ultrasound of the kidneys and urinary bladder was performed including color flow and Doppler evaluation of the kidneys.     COMPARISON:  None.     FINDINGS:  Right kidney: The right kidney measures 11.7 cm. No cortical thinning. No loss of corticomedullary distinction. Resistive index measures 0.55.  No mass. No renal stone. No hydronephrosis.     Left kidney: The left kidney measures 12.4 cm. No cortical thinning. No loss of corticomedullary distinction. Resistive index measures 0.51.  No mass. No renal stone. No hydronephrosis.     The bladder is partially distended at the time of scanning and has an unremarkable appearance.  RIGHT ovarian cyst at level of pain 2.8 x 1.8 x 2.8 cm.  Dedicated pelvic ultrasound recommended for  further evaluation.     Impression:     No significant renal abnormality.     RIGHT ovarian cyst at level pain 2.8 x 1.8 x 2.8 cm, incompletely visualized.  Dedicated pelvic ultrasound recommended for further evaluation.        Electronically signed by: Michelet Greenfield MD  Date:                                            12/02/2022  Time:                                           17:38     Assessment and plan:    --US negative for hydro, masses, stones; pt has fu with OB to discuss right ovarian cyst   --Continue Uribel PRN  --start amitriptyline 10 mg po HS     --rtc in 1 month for medication assessment     This note is dictated on M*Modal word recognition program.  There are word recognition mistakes that are occasionally missed on review.       This service was not originating from a related E/M service provided within the previous 7 days nor will  to an E/M service or procedure within the next 24 hours or my soonest available appointment.  Prevailing standard of care was able to be met in this audio-only visit.

## 2022-12-19 ENCOUNTER — PATIENT MESSAGE (OUTPATIENT)
Dept: UROLOGY | Facility: CLINIC | Age: 32
End: 2022-12-19
Payer: MEDICAID

## 2022-12-21 ENCOUNTER — OFFICE VISIT (OUTPATIENT)
Dept: OBSTETRICS AND GYNECOLOGY | Facility: CLINIC | Age: 32
End: 2022-12-21
Payer: MEDICAID

## 2022-12-21 VITALS
BODY MASS INDEX: 30.11 KG/M2 | DIASTOLIC BLOOD PRESSURE: 82 MMHG | SYSTOLIC BLOOD PRESSURE: 128 MMHG | WEIGHT: 176.38 LBS | RESPIRATION RATE: 16 BRPM | HEIGHT: 64 IN

## 2022-12-21 DIAGNOSIS — N30.10 INTERSTITIAL CYSTITIS: ICD-10-CM

## 2022-12-21 DIAGNOSIS — R10.2 PELVIC PAIN: Primary | ICD-10-CM

## 2022-12-21 DIAGNOSIS — N64.4 BREAST TENDERNESS: ICD-10-CM

## 2022-12-21 PROCEDURE — 99214 PR OFFICE/OUTPT VISIT, EST, LEVL IV, 30-39 MIN: ICD-10-PCS | Mod: S$PBB,,, | Performed by: STUDENT IN AN ORGANIZED HEALTH CARE EDUCATION/TRAINING PROGRAM

## 2022-12-21 PROCEDURE — 99214 OFFICE O/P EST MOD 30 MIN: CPT | Mod: S$PBB,,, | Performed by: STUDENT IN AN ORGANIZED HEALTH CARE EDUCATION/TRAINING PROGRAM

## 2022-12-21 PROCEDURE — 3074F SYST BP LT 130 MM HG: CPT | Mod: CPTII,,, | Performed by: STUDENT IN AN ORGANIZED HEALTH CARE EDUCATION/TRAINING PROGRAM

## 2022-12-21 PROCEDURE — 3008F PR BODY MASS INDEX (BMI) DOCUMENTED: ICD-10-PCS | Mod: CPTII,,, | Performed by: STUDENT IN AN ORGANIZED HEALTH CARE EDUCATION/TRAINING PROGRAM

## 2022-12-21 PROCEDURE — 3079F DIAST BP 80-89 MM HG: CPT | Mod: CPTII,,, | Performed by: STUDENT IN AN ORGANIZED HEALTH CARE EDUCATION/TRAINING PROGRAM

## 2022-12-21 PROCEDURE — 3079F PR MOST RECENT DIASTOLIC BLOOD PRESSURE 80-89 MM HG: ICD-10-PCS | Mod: CPTII,,, | Performed by: STUDENT IN AN ORGANIZED HEALTH CARE EDUCATION/TRAINING PROGRAM

## 2022-12-21 PROCEDURE — 3008F BODY MASS INDEX DOCD: CPT | Mod: CPTII,,, | Performed by: STUDENT IN AN ORGANIZED HEALTH CARE EDUCATION/TRAINING PROGRAM

## 2022-12-21 PROCEDURE — 99999 PR PBB SHADOW E&M-EST. PATIENT-LVL IV: CPT | Mod: PBBFAC,,, | Performed by: STUDENT IN AN ORGANIZED HEALTH CARE EDUCATION/TRAINING PROGRAM

## 2022-12-21 PROCEDURE — 3074F PR MOST RECENT SYSTOLIC BLOOD PRESSURE < 130 MM HG: ICD-10-PCS | Mod: CPTII,,, | Performed by: STUDENT IN AN ORGANIZED HEALTH CARE EDUCATION/TRAINING PROGRAM

## 2022-12-21 PROCEDURE — 99999 PR PBB SHADOW E&M-EST. PATIENT-LVL IV: ICD-10-PCS | Mod: PBBFAC,,, | Performed by: STUDENT IN AN ORGANIZED HEALTH CARE EDUCATION/TRAINING PROGRAM

## 2022-12-21 PROCEDURE — 1159F PR MEDICATION LIST DOCUMENTED IN MEDICAL RECORD: ICD-10-PCS | Mod: CPTII,,, | Performed by: STUDENT IN AN ORGANIZED HEALTH CARE EDUCATION/TRAINING PROGRAM

## 2022-12-21 PROCEDURE — 99214 OFFICE O/P EST MOD 30 MIN: CPT | Mod: PBBFAC,PN | Performed by: STUDENT IN AN ORGANIZED HEALTH CARE EDUCATION/TRAINING PROGRAM

## 2022-12-21 PROCEDURE — 1159F MED LIST DOCD IN RCRD: CPT | Mod: CPTII,,, | Performed by: STUDENT IN AN ORGANIZED HEALTH CARE EDUCATION/TRAINING PROGRAM

## 2022-12-21 RX ORDER — CONJUGATED ESTROGENS 0.62 MG/G
CREAM VAGINAL
Qty: 45 G | Refills: 12 | Status: SHIPPED | OUTPATIENT
Start: 2022-12-21 | End: 2023-06-29

## 2022-12-21 RX ORDER — PROGESTERONE 200 MG/1
200 CAPSULE ORAL NIGHTLY
Qty: 12 CAPSULE | Refills: 11 | Status: SHIPPED | OUTPATIENT
Start: 2022-12-21 | End: 2023-12-26
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
## 2023-01-02 ENCOUNTER — PATIENT MESSAGE (OUTPATIENT)
Dept: UROLOGY | Facility: CLINIC | Age: 33
End: 2023-01-02
Payer: MEDICAID

## 2023-01-12 ENCOUNTER — OFFICE VISIT (OUTPATIENT)
Dept: UROLOGY | Facility: CLINIC | Age: 33
End: 2023-01-12
Payer: MEDICAID

## 2023-01-12 DIAGNOSIS — N34.1 NGU DUE TO UREAPLASMA UREALYTICUM: Primary | ICD-10-CM

## 2023-01-12 DIAGNOSIS — R39.89 BLADDER PAIN: ICD-10-CM

## 2023-01-12 DIAGNOSIS — A49.3 NGU DUE TO UREAPLASMA UREALYTICUM: Primary | ICD-10-CM

## 2023-01-12 PROCEDURE — 1160F PR REVIEW ALL MEDS BY PRESCRIBER/CLIN PHARMACIST DOCUMENTED: ICD-10-PCS | Mod: CPTII,95,, | Performed by: NURSE PRACTITIONER

## 2023-01-12 PROCEDURE — 1160F RVW MEDS BY RX/DR IN RCRD: CPT | Mod: CPTII,95,, | Performed by: NURSE PRACTITIONER

## 2023-01-12 PROCEDURE — 1159F MED LIST DOCD IN RCRD: CPT | Mod: CPTII,95,, | Performed by: NURSE PRACTITIONER

## 2023-01-12 PROCEDURE — 99214 PR OFFICE/OUTPT VISIT, EST, LEVL IV, 30-39 MIN: ICD-10-PCS | Mod: 95,,, | Performed by: NURSE PRACTITIONER

## 2023-01-12 PROCEDURE — 1159F PR MEDICATION LIST DOCUMENTED IN MEDICAL RECORD: ICD-10-PCS | Mod: CPTII,95,, | Performed by: NURSE PRACTITIONER

## 2023-01-12 PROCEDURE — 99214 OFFICE O/P EST MOD 30 MIN: CPT | Mod: 95,,, | Performed by: NURSE PRACTITIONER

## 2023-01-12 RX ORDER — METHENAMINE, SODIUM PHOSPHATE, MONOBASIC, MONOHYDRATE, PHENYL SALICYLATE, METHYLENE BLUE, AND HYOSCYAMINE SULFATE 118; 40.8; 36; 10; .12 MG/1; MG/1; MG/1; MG/1; MG/1
1 CAPSULE ORAL 4 TIMES DAILY PRN
Qty: 30 CAPSULE | Refills: 4 | Status: SHIPPED | OUTPATIENT
Start: 2023-01-12 | End: 2023-10-25 | Stop reason: SDUPTHER

## 2023-01-12 RX ORDER — HYDROXYZINE HYDROCHLORIDE 25 MG/1
TABLET, FILM COATED ORAL
Qty: 20 TABLET | Refills: 0 | Status: SHIPPED | OUTPATIENT
Start: 2023-01-12 | End: 2023-06-29

## 2023-01-12 NOTE — PROGRESS NOTES
Established Patient - Audio Only Telehealth Visit     The patient location is: [place]  The chief complaint leading to consultation is: US results   Visit type: Virtual visit with audio only (telephone)  Total time spent with patient: 20 minutes        The reason for the audio only service rather than synchronous audio and video virtual visit was related to technical difficulties or patient preference/necessity.     Each patient to whom I provide medical services by telemedicine is:  (1) informed of the relationship between the physician and patient and the respective role of any other health care provider with respect to management of the patient; and (2) notified that they may decline to receive medical services by telemedicine and may withdraw from such care at any time. Patient verbally consented to receive this service via voice-only telephone call.       HPI:   Possible history if IC with Alana; previously was taking Uribel b.i.d.  Now on amitriptyline 10 mg before bed; reports mild daytime drowsiness  She presented to the emergency department on 01/03 with complaint severe suprapubic pain and dysuria.  She is completed Macrobid Urine culture revealed E coli  Currently using Uribel p.r.n..  She was given a prescription of Vistaril in the emergency room and states that it worked well; request refill.         Assessment and plan:    --refill sent for Vistaril  --refill sent for Uribel  --standing UC order in place  --continue amitriptyline q.h.s.    --rtc in 1 month for medication assessment     This note is dictated on M*Modal word recognition program.  There are word recognition mistakes that are occasionally missed on review.       This service was not originating from a related E/M service provided within the previous 7 days nor will  to an E/M service or procedure within the next 24 hours or my soonest available appointment.  Prevailing standard of care was able to be met in this audio-only visit.        .

## 2023-01-17 ENCOUNTER — TELEPHONE (OUTPATIENT)
Dept: PRIMARY CARE CLINIC | Facility: CLINIC | Age: 33
End: 2023-01-17
Payer: MEDICAID

## 2023-01-17 NOTE — TELEPHONE ENCOUNTER
Called patient to inform her that Dr. Morocho do not have any available appointments. No answer. LVM 1/17/23

## 2023-02-01 ENCOUNTER — TELEPHONE (OUTPATIENT)
Dept: PRIMARY CARE CLINIC | Facility: CLINIC | Age: 33
End: 2023-02-01
Payer: MEDICAID

## 2023-02-01 DIAGNOSIS — J02.9 SORETHROAT: Primary | ICD-10-CM

## 2023-02-01 DIAGNOSIS — R13.13 PHARYNGEAL DYSPHAGIA: ICD-10-CM

## 2023-02-01 NOTE — TELEPHONE ENCOUNTER
----- Message from Ramya Hinkle sent at 2/1/2023  8:53 AM CST -----  Contact: pt [phone]  Patient would like to get a referral.  Referral to what specialty:  ENT  Does the patient want the referral with a specific physician:  Yes  Is the specialist an [facility]:  Nai Eisenberg Fax # [phone]  Reason (be specific):  Sore throat and hard to swallow  Does the patient already have the specialty clinic appointment scheduled:  No      Is the insurance listed in Epic correct? Yes    The patient is hoping to hoping to be seen by this doctor today, they are waiting for the referral to schedule her.

## 2023-02-14 ENCOUNTER — PATIENT MESSAGE (OUTPATIENT)
Dept: OBSTETRICS AND GYNECOLOGY | Facility: CLINIC | Age: 33
End: 2023-02-14
Payer: MEDICAID

## 2023-02-27 ENCOUNTER — PATIENT MESSAGE (OUTPATIENT)
Dept: UROLOGY | Facility: CLINIC | Age: 33
End: 2023-02-27
Payer: MEDICAID

## 2023-02-28 ENCOUNTER — PATIENT MESSAGE (OUTPATIENT)
Dept: UROLOGY | Facility: CLINIC | Age: 33
End: 2023-02-28
Payer: MEDICAID

## 2023-03-01 ENCOUNTER — PATIENT MESSAGE (OUTPATIENT)
Dept: PRIMARY CARE CLINIC | Facility: CLINIC | Age: 33
End: 2023-03-01
Payer: MEDICAID

## 2023-03-02 RX ORDER — OXYBUTYNIN CHLORIDE 5 MG/1
5 TABLET ORAL 3 TIMES DAILY PRN
Qty: 30 TABLET | Refills: 0 | Status: SHIPPED | OUTPATIENT
Start: 2023-03-02 | End: 2023-06-28 | Stop reason: SDUPTHER

## 2023-03-20 ENCOUNTER — LAB VISIT (OUTPATIENT)
Dept: LAB | Facility: HOSPITAL | Age: 33
End: 2023-03-20
Attending: NURSE PRACTITIONER
Payer: MEDICAID

## 2023-03-20 ENCOUNTER — OFFICE VISIT (OUTPATIENT)
Dept: OTOLARYNGOLOGY | Facility: CLINIC | Age: 33
End: 2023-03-20
Payer: MEDICAID

## 2023-03-20 VITALS
HEIGHT: 64 IN | SYSTOLIC BLOOD PRESSURE: 128 MMHG | BODY MASS INDEX: 32.52 KG/M2 | WEIGHT: 190.5 LBS | HEART RATE: 99 BPM | DIASTOLIC BLOOD PRESSURE: 87 MMHG

## 2023-03-20 DIAGNOSIS — R59.9 ADENOPATHY: Primary | ICD-10-CM

## 2023-03-20 DIAGNOSIS — R59.9 ADENOPATHY: ICD-10-CM

## 2023-03-20 PROBLEM — J03.90 TONSILLITIS: Status: RESOLVED | Noted: 2019-08-08 | Resolved: 2023-03-20

## 2023-03-20 LAB
CREAT SERPL-MCNC: 0.7 MG/DL (ref 0.5–1.4)
EST. GFR  (NO RACE VARIABLE): >60 ML/MIN/1.73 M^2

## 2023-03-20 PROCEDURE — 36415 COLL VENOUS BLD VENIPUNCTURE: CPT | Performed by: NURSE PRACTITIONER

## 2023-03-20 PROCEDURE — 99999 PR PBB SHADOW E&M-EST. PATIENT-LVL III: CPT | Mod: PBBFAC,,, | Performed by: NURSE PRACTITIONER

## 2023-03-20 PROCEDURE — 31575 DIAGNOSTIC LARYNGOSCOPY: CPT | Mod: S$PBB,,, | Performed by: NURSE PRACTITIONER

## 2023-03-20 PROCEDURE — 3074F SYST BP LT 130 MM HG: CPT | Mod: CPTII,,, | Performed by: NURSE PRACTITIONER

## 2023-03-20 PROCEDURE — 3079F PR MOST RECENT DIASTOLIC BLOOD PRESSURE 80-89 MM HG: ICD-10-PCS | Mod: CPTII,,, | Performed by: NURSE PRACTITIONER

## 2023-03-20 PROCEDURE — 3079F DIAST BP 80-89 MM HG: CPT | Mod: CPTII,,, | Performed by: NURSE PRACTITIONER

## 2023-03-20 PROCEDURE — 1159F PR MEDICATION LIST DOCUMENTED IN MEDICAL RECORD: ICD-10-PCS | Mod: CPTII,,, | Performed by: NURSE PRACTITIONER

## 2023-03-20 PROCEDURE — 82565 ASSAY OF CREATININE: CPT | Performed by: NURSE PRACTITIONER

## 2023-03-20 PROCEDURE — 3074F PR MOST RECENT SYSTOLIC BLOOD PRESSURE < 130 MM HG: ICD-10-PCS | Mod: CPTII,,, | Performed by: NURSE PRACTITIONER

## 2023-03-20 PROCEDURE — 99999 PR PBB SHADOW E&M-EST. PATIENT-LVL III: ICD-10-PCS | Mod: PBBFAC,,, | Performed by: NURSE PRACTITIONER

## 2023-03-20 PROCEDURE — 3008F PR BODY MASS INDEX (BMI) DOCUMENTED: ICD-10-PCS | Mod: CPTII,,, | Performed by: NURSE PRACTITIONER

## 2023-03-20 PROCEDURE — 99204 OFFICE O/P NEW MOD 45 MIN: CPT | Mod: S$PBB,25,, | Performed by: NURSE PRACTITIONER

## 2023-03-20 PROCEDURE — 3008F BODY MASS INDEX DOCD: CPT | Mod: CPTII,,, | Performed by: NURSE PRACTITIONER

## 2023-03-20 PROCEDURE — 1159F MED LIST DOCD IN RCRD: CPT | Mod: CPTII,,, | Performed by: NURSE PRACTITIONER

## 2023-03-20 PROCEDURE — 31575 PR LARYNGOSCOPY, FLEXIBLE; DIAGNOSTIC: ICD-10-PCS | Mod: S$PBB,,, | Performed by: NURSE PRACTITIONER

## 2023-03-20 PROCEDURE — 99204 PR OFFICE/OUTPT VISIT, NEW, LEVL IV, 45-59 MIN: ICD-10-PCS | Mod: S$PBB,25,, | Performed by: NURSE PRACTITIONER

## 2023-03-20 PROCEDURE — 99213 OFFICE O/P EST LOW 20 MIN: CPT | Mod: PBBFAC,25 | Performed by: NURSE PRACTITIONER

## 2023-03-20 PROCEDURE — 31575 DIAGNOSTIC LARYNGOSCOPY: CPT | Mod: PBBFAC | Performed by: NURSE PRACTITIONER
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
## 2023-03-20 NOTE — ASSESSMENT & PLAN NOTE
Multiple enlarged nodes seen on outside US. CT neck ordered for better evaluation. Questions answered. She will follow up with Dr. Steen after scan.

## 2023-03-21 ENCOUNTER — HOSPITAL ENCOUNTER (OUTPATIENT)
Dept: RADIOLOGY | Facility: HOSPITAL | Age: 33
Discharge: HOME | End: 2023-03-21
Attending: NURSE PRACTITIONER
Payer: MEDICAID

## 2023-03-21 DIAGNOSIS — R59.9 ADENOPATHY: ICD-10-CM

## 2023-03-21 PROCEDURE — 70491 CT SOFT TISSUE NECK WITH CONTRAST: ICD-10-PCS | Mod: 26,,, | Performed by: RADIOLOGY

## 2023-03-21 PROCEDURE — 70491 CT SOFT TISSUE NECK W/DYE: CPT | Mod: 26,,, | Performed by: RADIOLOGY

## 2023-03-21 PROCEDURE — 25500020 PHARM REV CODE 255: Performed by: NURSE PRACTITIONER

## 2023-03-21 PROCEDURE — 70491 CT SOFT TISSUE NECK W/DYE: CPT | Mod: TC

## 2023-03-21 RX ADMIN — IOHEXOL 100 ML: 350 INJECTION, SOLUTION INTRAVENOUS at 06:03

## 2023-03-23 ENCOUNTER — PATIENT MESSAGE (OUTPATIENT)
Dept: OTOLARYNGOLOGY | Facility: CLINIC | Age: 33
End: 2023-03-23
Payer: MEDICAID

## 2023-03-27 ENCOUNTER — OFFICE VISIT (OUTPATIENT)
Dept: OTOLARYNGOLOGY | Facility: CLINIC | Age: 33
End: 2023-03-27
Payer: MEDICAID

## 2023-03-27 VITALS
HEART RATE: 111 BPM | SYSTOLIC BLOOD PRESSURE: 136 MMHG | DIASTOLIC BLOOD PRESSURE: 90 MMHG | BODY MASS INDEX: 33.15 KG/M2 | WEIGHT: 193.13 LBS | TEMPERATURE: 98 F

## 2023-03-27 DIAGNOSIS — R59.9 ADENOPATHY: Primary | ICD-10-CM

## 2023-03-27 PROCEDURE — 99999 PR PBB SHADOW E&M-EST. PATIENT-LVL IV: ICD-10-PCS | Mod: PBBFAC,,, | Performed by: OTOLARYNGOLOGY

## 2023-03-27 PROCEDURE — 99213 OFFICE O/P EST LOW 20 MIN: CPT | Mod: S$PBB,,, | Performed by: OTOLARYNGOLOGY

## 2023-03-27 PROCEDURE — 3075F SYST BP GE 130 - 139MM HG: CPT | Mod: CPTII,,, | Performed by: OTOLARYNGOLOGY

## 2023-03-27 PROCEDURE — 99213 PR OFFICE/OUTPT VISIT, EST, LEVL III, 20-29 MIN: ICD-10-PCS | Mod: S$PBB,,, | Performed by: OTOLARYNGOLOGY

## 2023-03-27 PROCEDURE — 3008F PR BODY MASS INDEX (BMI) DOCUMENTED: ICD-10-PCS | Mod: CPTII,,, | Performed by: OTOLARYNGOLOGY

## 2023-03-27 PROCEDURE — 99999 PR PBB SHADOW E&M-EST. PATIENT-LVL IV: CPT | Mod: PBBFAC,,, | Performed by: OTOLARYNGOLOGY

## 2023-03-27 PROCEDURE — 1160F RVW MEDS BY RX/DR IN RCRD: CPT | Mod: CPTII,,, | Performed by: OTOLARYNGOLOGY

## 2023-03-27 PROCEDURE — 99214 OFFICE O/P EST MOD 30 MIN: CPT | Mod: PBBFAC | Performed by: OTOLARYNGOLOGY

## 2023-03-27 PROCEDURE — 1159F PR MEDICATION LIST DOCUMENTED IN MEDICAL RECORD: ICD-10-PCS | Mod: CPTII,,, | Performed by: OTOLARYNGOLOGY

## 2023-03-27 PROCEDURE — 3075F PR MOST RECENT SYSTOLIC BLOOD PRESS GE 130-139MM HG: ICD-10-PCS | Mod: CPTII,,, | Performed by: OTOLARYNGOLOGY

## 2023-03-27 PROCEDURE — 3080F PR MOST RECENT DIASTOLIC BLOOD PRESSURE >= 90 MM HG: ICD-10-PCS | Mod: CPTII,,, | Performed by: OTOLARYNGOLOGY

## 2023-03-27 PROCEDURE — 3008F BODY MASS INDEX DOCD: CPT | Mod: CPTII,,, | Performed by: OTOLARYNGOLOGY

## 2023-03-27 PROCEDURE — 1160F PR REVIEW ALL MEDS BY PRESCRIBER/CLIN PHARMACIST DOCUMENTED: ICD-10-PCS | Mod: CPTII,,, | Performed by: OTOLARYNGOLOGY

## 2023-03-27 PROCEDURE — 1159F MED LIST DOCD IN RCRD: CPT | Mod: CPTII,,, | Performed by: OTOLARYNGOLOGY

## 2023-03-27 PROCEDURE — 3080F DIAST BP >= 90 MM HG: CPT | Mod: CPTII,,, | Performed by: OTOLARYNGOLOGY

## 2023-03-29 NOTE — PROGRESS NOTES
Chief Complaint   Patient presents with    Follow-up     R neck mass, post CT       HPI   32 y.o. female presents in follow-up in follow-up after undergoing CT scan of the neck.  She underwent CT scan for further evaluation of her cervical nodes after undergoing ultrasound which revealed questionable abnormal lymph nodes.  Her CT scan was unremarkable.  No complaints today.    Review of Systems   Constitutional: Negative for fatigue and unexpected weight change.   HENT: Per HPI.  Eyes: Negative for visual disturbance.   Respiratory: Negative for shortness of breath, hemoptysis   Cardiovascular: Negative for chest pain and palpitations.   Musculoskeletal: Negative for decreased ROM, back pain.   Skin: Negative for rash, sunburn, itching.   Neurological: Negative for dizziness and seizures.   Hematological: Negative for adenopathy. Does not bruise/bleed easily.   Endocrine: Negative for rapid weight loss/weight gain, heat/cold intolerance.     Past Medical History   Patient Active Problem List   Diagnosis    Anxiety    Depression with suicidal ideation    Sore throat    s/p dx lap. hscope D&C/ cystoscopy     s/p TLH/LSO/cysto    Adenopathy           Past Surgical History   Past Surgical History:   Procedure Laterality Date    ANTERIOR VAGINAL REPAIR  2010    CYSTOSCOPY N/A 12/14/2021    Procedure: CYSTOSCOPY;  Surgeon: Betty Fuentes MD;  Location: [facility];  Service: OB/GYN;  Laterality: N/A;    DIAGNOSTIC LAPAROSCOPY N/A 12/14/2021    Procedure: LAPAROSCOPY, DIAGNOSTIC;  Surgeon: Betty Fuentes MD;  Location: [facility];  Service: OB/GYN;  Laterality: N/A;    HYSTEROSCOPIC POLYPECTOMY OF UTERUS N/A 12/14/2021    Procedure: POLYPECTOMY, UTERUS, HYSTEROSCOPIC using myosure;  Surgeon: Betty Fuentes MD;  Location: [facility];  Service: OB/GYN;  Laterality: N/A;    HYSTEROSCOPY WITH DILATION AND CURETTAGE OF UTERUS N/A 12/14/2021    Procedure: HYSTEROSCOPY, WITH DILATION AND CURETTAGE OF UTERUS;  Surgeon: Betty Fuentes  MD;  Location: [facility];  Service: OB/GYN;  Laterality: N/A;    LAPAROSCOPIC TOTAL HYSTERECTOMY N/A 3/22/2022    Procedure: HYSTERECTOMY, TOTAL, LAPAROSCOPIC;  Surgeon: Betty Fuentes MD;  Location: [facility] OR;  Service: OB/GYN;  Laterality: N/A;    OOPHORECTOMY Left 3/22/2022    Procedure: OOPHORECTOMY;  Surgeon: Betty Fuentes MD;  Location: [facility] OR;  Service: OB/GYN;  Laterality: Left;    SALPINGECTOMY Bilateral     TONSILLECTOMY Bilateral 2019    Procedure: TONSILLECTOMY;  Surgeon: Geovani Steen MD;  Location: [address];  Service: ENT;  Laterality: Bilateral;    TONSILLECTOMY      VULVA SURGERY      BREE         Family History   Family History   Problem Relation Age of Onset    Breast cancer Paternal Grandmother     Breast cancer Maternal Grandmother     Ovarian cancer Mother     Cervical cancer Mother     Bladder Cancer Mother     Multiple myeloma Maternal Aunt     Throat cancer Father     Colon cancer Neg Hx     Cancer Neg Hx     Diabetes Neg Hx     Hypertension Neg Hx     Eclampsia Neg Hx     Miscarriages / Stillbirths Neg Hx      labor Neg Hx     Stroke Neg Hx            Social History   .  Social History     Socioeconomic History    Marital status:    Tobacco Use    Smoking status: Never    Smokeless tobacco: Never   Substance and Sexual Activity    Alcohol use: Yes     Comment: occas    Drug use: No    Sexual activity: Yes     Partners: Male     Birth control/protection: See Surgical Hx         Allergies   Review of patient's allergies indicates:   Allergen Reactions    Amoxicillin Hives    Azithromycin Rash     Yeast infection           Physical Exam     Vitals:    23 1251   BP: (!) 136/90   Pulse: (!) 111   Temp: 98.1 °F (36.7 °C)         Body mass index is 33.15 kg/m².      General: AOx3, NAD   Respiratory:  Symmetric chest rise, normal effort  Nose: No gross nasal septal deviation. Inferior Turbinates WNL bilaterally. No septal perforation. No masses/lesions.    Oral Cavity:  Oral Tongue mobile, no lesions noted. Hard Palate WNL. No buccal or FOM lesions.  Oropharynx:  No masses/lesions of the posterior pharyngeal wall. Tonsillar fossa without lesions. Soft palate without masses. Midline uvula.   Neck: No scars.  No cervical lymphadenopathy, thyromegaly or thyroid nodules.  Normal range of motion.    Face: House Brackmann I bilaterally.     CT neck reviewed.    Assessment/Plan  Problem List Items Addressed This Visit          Other    Adenopathy - Primary     CT unremarkable.  Reassured.  Return p.r.n..

## 2023-03-30 ENCOUNTER — PATIENT MESSAGE (OUTPATIENT)
Dept: OTOLARYNGOLOGY | Facility: CLINIC | Age: 33
End: 2023-03-30
Payer: MEDICAID

## 2023-04-04 ENCOUNTER — TELEPHONE (OUTPATIENT)
Dept: PRIMARY CARE CLINIC | Facility: CLINIC | Age: 33
End: 2023-04-04
Payer: MEDICAID

## 2023-04-04 NOTE — TELEPHONE ENCOUNTER
Returned patient call in regards to her panic attacks and medication needing to be increased. Patient was offered a virtual appointment and she agreed to the date and time of the appointment and verbalized understanding.

## 2023-04-04 NOTE — TELEPHONE ENCOUNTER
----- Message from Johana Herrera sent at 4/4/2023  9:51 AM CDT -----  Contact: Patient, [phone]  Calling to speak with the nurse regarding she is having panic attacks. Rupa call her. Thanks.

## 2023-04-06 ENCOUNTER — OFFICE VISIT (OUTPATIENT)
Dept: PRIMARY CARE CLINIC | Facility: CLINIC | Age: 33
End: 2023-04-06
Payer: MEDICAID

## 2023-04-06 DIAGNOSIS — F98.8 ATTENTION DEFICIT DISORDER, UNSPECIFIED HYPERACTIVITY PRESENCE: ICD-10-CM

## 2023-04-06 DIAGNOSIS — F41.0 PANIC ATTACK: Primary | ICD-10-CM

## 2023-04-06 DIAGNOSIS — F41.9 ANXIETY: ICD-10-CM

## 2023-04-06 PROCEDURE — 1160F PR REVIEW ALL MEDS BY PRESCRIBER/CLIN PHARMACIST DOCUMENTED: ICD-10-PCS | Mod: CPTII,95,, | Performed by: INTERNAL MEDICINE

## 2023-04-06 PROCEDURE — 1159F PR MEDICATION LIST DOCUMENTED IN MEDICAL RECORD: ICD-10-PCS | Mod: CPTII,95,, | Performed by: INTERNAL MEDICINE

## 2023-04-06 PROCEDURE — 99213 OFFICE O/P EST LOW 20 MIN: CPT | Mod: 95,,, | Performed by: INTERNAL MEDICINE

## 2023-04-06 PROCEDURE — 99213 PR OFFICE/OUTPT VISIT, EST, LEVL III, 20-29 MIN: ICD-10-PCS | Mod: 95,,, | Performed by: INTERNAL MEDICINE

## 2023-04-06 PROCEDURE — 1160F RVW MEDS BY RX/DR IN RCRD: CPT | Mod: CPTII,95,, | Performed by: INTERNAL MEDICINE

## 2023-04-06 PROCEDURE — 1159F MED LIST DOCD IN RCRD: CPT | Mod: CPTII,95,, | Performed by: INTERNAL MEDICINE

## 2023-04-06 RX ORDER — BUSPIRONE HYDROCHLORIDE 15 MG/1
15 TABLET ORAL 3 TIMES DAILY
Qty: 60 TABLET | Refills: 1 | Status: SHIPPED | OUTPATIENT
Start: 2023-04-06 | End: 2024-04-05

## 2023-04-06 RX ORDER — CLONAZEPAM 0.5 MG/1
0.5 TABLET ORAL 3 TIMES DAILY PRN
Qty: 45 TABLET | Refills: 0 | Status: SHIPPED | OUTPATIENT
Start: 2023-04-06 | End: 2023-07-03

## 2023-04-06 NOTE — PROGRESS NOTES
Subjective:    The patient location is: home  The chief complaint leading to consultation is: anxiety attack panic attack    Visit type: audiovisual    Face to Face time with patient: anxiety panic attack   minutes of total time spent on the encounter, which includes face to face time and non-face to face time preparing to see the patient (eg, review of tests), Obtaining and/or reviewing separately obtained history, Documenting clinical information in the electronic or other health record, Independently interpreting results (not separately reported) and communicating results to the patient/family/caregiver, or Care coordination (not separately reported).         Each patient to whom he or she provides medical services by telemedicine is:  (1) informed of the relationship between the physician and patient and the respective role of any other health care provider with respect to management of the patient; and (2) notified that he or she may decline to receive medical services by telemedicine and may withdraw from such care at any time.    Notes:     Patient ID: Rosa Crowley is a 32 y.o. female.    Chief Complaint: No chief complaint on file.    HPI  patient with history of posttraumatic stress syndrome anxiety depression panic attack a test done deficit disorder patient visit today complained of panic attack with physical symptom palpitation chest pain she can not stay at work had to leave home and stay home in the last 2 days she is not sure what triggered her panic attack dyspnea her psychiatrist is out of town and she is on a Klonopin for 5 mg p.o. t.i.d. p.r.n. for anxiety patient used to get 90 p.o. every month but now cut down to 60 pills patient has been having anxiety attack has a take 3 a day and can not get refill from her psychiatrist who is out of town she deny short of breath nausea vomiting diarrhea constipation headache abdominal pain she can not go back to work until she is cleared by   review blood test most of her blood tests are normal including thyroid function tests  Review of Systems    Objective:      Physical Exam  Constitutional:       Appearance: Normal appearance.      Comments: Anxious constantly moving denies any physical pain but appear nervous and sounds anxious   HENT:      Head: Atraumatic.      Mouth/Throat:      Mouth: Mucous membranes are dry.   Pulmonary:      Effort: Pulmonary effort is normal.   Neurological:      Mental Status: She is alert and oriented to person, place, and time.      Comments: coherent   Psychiatric:         Thought Content: Thought content normal.         Judgment: Judgment normal.      Comments: Very anxious nervous no physical c/o        Assessment:       1. Anxiety    2. Panic attack    3. Attention deficit disorder, unspecified hyperactivity presence        Plan:       Anxiety  -     clonazePAM (KLONOPIN) 0.5 MG tablet; Take 1 tablet (0.5 mg total) by mouth 3 (three) times daily as needed for Anxiety.  Dispense: 45 tablet; Refill: 0  -     busPIRone (BUSPAR) 15 MG tablet; Take 1 tablet (15 mg total) by mouth 3 (three) times daily.  Dispense: 60 tablet; Refill: 1    Panic attack  Comments:  Will refill her Klonopin and add BuSpar twice a day and patient need to keep appointment with her psychiatrist yet know for work Monday  Orders:  -     clonazePAM (KLONOPIN) 0.5 MG tablet; Take 1 tablet (0.5 mg total) by mouth 3 (three) times daily as needed for Anxiety.  Dispense: 45 tablet; Refill: 0  -     busPIRone (BUSPAR) 15 MG tablet; Take 1 tablet (15 mg total) by mouth 3 (three) times daily.  Dispense: 60 tablet; Refill: 1    Attention deficit disorder, unspecified hyperactivity presence  Comments:  Continue with current medication as per Psychiatry and need follow-up        Medication List with Changes/Refills   New Medications    BUSPIRONE (BUSPAR) 15 MG TABLET    Take 1 tablet (15 mg total) by mouth 3 (three) times daily.   Current Medications     AMITRIPTYLINE (ELAVIL) 10 MG TABLET    Take 1 tablet (10 mg total) by mouth nightly as needed for Pain.    BUPROPION (WELLBUTRIN XL) 300 MG 24 HR TABLET    Take 450 mg by mouth every evening.    BUTALBITAL-ACETAMINOPHEN-CAFFEINE -40 MG (FIORICET, ESGIC) -40 MG PER TABLET    TAKE 1 TABLET BY MOUTH TWICE DAILY AS NEEDED FOR HEADACHE    CONJUGATED ESTROGENS (PREMARIN) VAGINAL CREAM    Place a pea-sized amount in vagina every night for 2 weeks, then use 2-3 nights a week    DEXTROAMPHETAMINE-AMPHETAMINE (ADDERALL) 20 MG TABLET    Take by mouth.    ESTRADIOL (VIVELLE-DOT) 0.1 MG/24 HR PTSW    Place 1 patch onto the skin twice a week.    HYDROCODONE-ACETAMINOPHEN (NORCO) 5-325 MG PER TABLET    Take 1 tablet by mouth every 8 (eight) hours as needed for Pain.    HYDROXYZINE HCL (ATARAX) 25 MG TABLET    1-2 tablets every 8 hours as needed    IBUPROFEN (ADVIL,MOTRIN) 800 MG TABLET    Take 1 tablet (800 mg total) by mouth 3 (three) times daily.    ONDANSETRON (ZOFRAN) 4 MG TABLET    Take 4 mg by mouth every 8 (eight) hours as needed.    ONDANSETRON (ZOFRAN-ODT) 8 MG TBDL    Take 1 tablet (8 mg total) by mouth every 6 (six) hours as needed (nausea).    OXYBUTYNIN (DITROPAN) 5 MG TAB    Take 1 tablet (5 mg total) by mouth 3 (three) times daily as needed (Bladder spasm/pain).    PROGESTERONE (PROMETRIUM) 200 MG CAPSULE    Take 1 capsule (200 mg total) by mouth nightly.    TRIAMCINOLONE ACETONIDE 0.5% (KENALOG) 0.5 % CREA    Apply topically 3 (three) times daily as needed.    URIBEL 118-10-40.8-36 MG CAP    Take 1 capsule by mouth 4 (four) times daily as needed (bladder pain).   Changed and/or Refilled Medications    Modified Medication Previous Medication    CLONAZEPAM (KLONOPIN) 0.5 MG TABLET clonazePAM (KLONOPIN) 0.5 MG tablet       Take 1 tablet (0.5 mg total) by mouth 3 (three) times daily as needed for Anxiety.    Take 0.5 mg by mouth 3 (three) times daily as needed.

## 2023-04-06 NOTE — LETTER
April 6, 2023      [facility] [address]  [address], [facility] [address]  [facility] [address]  Phone: [phone]  Fax: [phone]       Patient: Rosa Crowley   YOB: 1990  Date of Visit: 04/06/2023    To Whom It May Concern:    Grant Crowley  was at [facility] on 04/06/2023. The patient may return to work/school on 04/10/2023 with no restrictions. If you have any questions or concerns, or if I can be of further assistance, please do not hesitate to contact me.    Sincerely,    Dr. Selvin Morocho M.D.

## 2023-05-11 ENCOUNTER — PATIENT MESSAGE (OUTPATIENT)
Dept: UROLOGY | Facility: CLINIC | Age: 33
End: 2023-05-11
Payer: MEDICAID

## 2023-05-16 ENCOUNTER — PATIENT MESSAGE (OUTPATIENT)
Dept: PRIMARY CARE CLINIC | Facility: CLINIC | Age: 33
End: 2023-05-16
Payer: MEDICAID

## 2023-05-16 DIAGNOSIS — R51.9 NONINTRACTABLE HEADACHE, UNSPECIFIED CHRONICITY PATTERN, UNSPECIFIED HEADACHE TYPE: ICD-10-CM

## 2023-05-16 RX ORDER — BUTALBITAL, ACETAMINOPHEN AND CAFFEINE 50; 325; 40 MG/1; MG/1; MG/1
TABLET ORAL
Qty: 30 TABLET | Refills: 0 | Status: SHIPPED | OUTPATIENT
Start: 2023-05-16 | End: 2023-08-25 | Stop reason: SDUPTHER

## 2023-05-16 NOTE — TELEPHONE ENCOUNTER
No care due was identified.  [facility] Embedded Care Due Messages. Reference number: [id number].   5/16/2023 12:46:19 PM CDT

## 2023-05-24 ENCOUNTER — PATIENT MESSAGE (OUTPATIENT)
Dept: UROLOGY | Facility: CLINIC | Age: 33
End: 2023-05-24
Payer: MEDICAID

## 2023-05-25 DIAGNOSIS — N30.00 ACUTE CYSTITIS WITHOUT HEMATURIA: Primary | ICD-10-CM

## 2023-05-25 RX ORDER — NITROFURANTOIN 25; 75 MG/1; MG/1
100 CAPSULE ORAL 2 TIMES DAILY
Qty: 14 CAPSULE | Refills: 0 | Status: SHIPPED | OUTPATIENT
Start: 2023-05-25 | End: 2023-06-29

## 2023-06-13 ENCOUNTER — HOSPITAL ENCOUNTER (EMERGENCY)
Facility: HOSPITAL | Age: 33
Discharge: HOME | End: 2023-06-13
Attending: EMERGENCY MEDICINE
Payer: MEDICAID

## 2023-06-13 VITALS
DIASTOLIC BLOOD PRESSURE: 94 MMHG | OXYGEN SATURATION: 100 % | RESPIRATION RATE: 18 BRPM | SYSTOLIC BLOOD PRESSURE: 129 MMHG | HEIGHT: 64 IN | HEART RATE: 119 BPM | TEMPERATURE: 99 F | BODY MASS INDEX: 30.73 KG/M2 | WEIGHT: 180 LBS

## 2023-06-13 DIAGNOSIS — R06.02 SOB (SHORTNESS OF BREATH): ICD-10-CM

## 2023-06-13 DIAGNOSIS — R00.0 TACHYCARDIA: Primary | ICD-10-CM

## 2023-06-13 DIAGNOSIS — M79.644 FINGER PAIN, RIGHT: ICD-10-CM

## 2023-06-13 DIAGNOSIS — R07.9 CHEST PAIN: ICD-10-CM

## 2023-06-13 DIAGNOSIS — R07.9 CHEST PAIN, UNSPECIFIED TYPE: ICD-10-CM

## 2023-06-13 LAB
ALBUMIN SERPL BCP-MCNC: 4.4 G/DL (ref 3.5–5.2)
ALP SERPL-CCNC: 59 U/L (ref 55–135)
ALT SERPL W/O P-5'-P-CCNC: 14 U/L (ref 10–44)
ANION GAP SERPL CALC-SCNC: 6 MMOL/L (ref 8–16)
AST SERPL-CCNC: 12 U/L (ref 10–40)
BASOPHILS # BLD AUTO: 0.04 K/UL (ref 0–0.2)
BASOPHILS NFR BLD: 0.9 % (ref 0–1.9)
BILIRUB SERPL-MCNC: 1.3 MG/DL (ref 0.1–1)
BNP SERPL-MCNC: 6 PG/ML (ref 0–99)
BUN SERPL-MCNC: 11 MG/DL (ref 6–20)
CALCIUM SERPL-MCNC: 8.8 MG/DL (ref 8.7–10.5)
CHLORIDE SERPL-SCNC: 105 MMOL/L (ref 95–110)
CO2 SERPL-SCNC: 23 MMOL/L (ref 23–29)
CREAT SERPL-MCNC: 0.7 MG/DL (ref 0.5–1.4)
D DIMER PPP IA.FEU-MCNC: 0.19 MG/L FEU
DIFFERENTIAL METHOD: ABNORMAL
EOSINOPHIL # BLD AUTO: 0.2 K/UL (ref 0–0.5)
EOSINOPHIL NFR BLD: 5.6 % (ref 0–8)
ERYTHROCYTE [DISTWIDTH] IN BLOOD BY AUTOMATED COUNT: 13.1 % (ref 11.5–14.5)
EST. GFR  (NO RACE VARIABLE): >60 ML/MIN/1.73 M^2
GLUCOSE SERPL-MCNC: 103 MG/DL (ref 70–110)
HCT VFR BLD AUTO: 36 % (ref 37–48.5)
HGB BLD-MCNC: 12.8 G/DL (ref 12–16)
IMM GRANULOCYTES # BLD AUTO: 0.02 K/UL (ref 0–0.04)
IMM GRANULOCYTES NFR BLD AUTO: 0.5 % (ref 0–0.5)
INR PPP: 1 (ref 0.8–1.2)
LYMPHOCYTES # BLD AUTO: 1.1 K/UL (ref 1–4.8)
LYMPHOCYTES NFR BLD: 26.8 % (ref 18–48)
MAGNESIUM SERPL-MCNC: 1.9 MG/DL (ref 1.6–2.6)
MCH RBC QN AUTO: 32.6 PG (ref 27–31)
MCHC RBC AUTO-ENTMCNC: 35.6 G/DL (ref 32–36)
MCV RBC AUTO: 92 FL (ref 82–98)
MONOCYTES # BLD AUTO: 0.5 K/UL (ref 0.3–1)
MONOCYTES NFR BLD: 11.1 % (ref 4–15)
NEUTROPHILS # BLD AUTO: 2.3 K/UL (ref 1.8–7.7)
NEUTROPHILS NFR BLD: 55.1 % (ref 38–73)
NRBC BLD-RTO: 0 /100 WBC
PLATELET # BLD AUTO: 203 K/UL (ref 150–450)
PMV BLD AUTO: 9.5 FL (ref 9.2–12.9)
POTASSIUM SERPL-SCNC: 4.2 MMOL/L (ref 3.5–5.1)
PROT SERPL-MCNC: 7.4 G/DL (ref 6–8.4)
PROTHROMBIN TIME: 10.8 SEC (ref 9–12.5)
RBC # BLD AUTO: 3.93 M/UL (ref 4–5.4)
SODIUM SERPL-SCNC: 134 MMOL/L (ref 136–145)
TROPONIN I SERPL HS-MCNC: <2.3 PG/ML (ref 0–14.9)
TSH SERPL DL<=0.005 MIU/L-ACNC: 0.42 UIU/ML (ref 0.34–5.6)
WBC # BLD AUTO: 4.25 K/UL (ref 3.9–12.7)

## 2023-06-13 PROCEDURE — 93010 EKG 12-LEAD: ICD-10-PCS | Mod: ,,, | Performed by: SPECIALIST

## 2023-06-13 PROCEDURE — 85610 PROTHROMBIN TIME: CPT

## 2023-06-13 PROCEDURE — 25000003 PHARM REV CODE 250: Performed by: EMERGENCY MEDICINE

## 2023-06-13 PROCEDURE — 83880 ASSAY OF NATRIURETIC PEPTIDE: CPT

## 2023-06-13 PROCEDURE — 83735 ASSAY OF MAGNESIUM: CPT

## 2023-06-13 PROCEDURE — 84443 ASSAY THYROID STIM HORMONE: CPT

## 2023-06-13 PROCEDURE — 99284 EMERGENCY DEPT VISIT MOD MDM: CPT | Mod: 25

## 2023-06-13 PROCEDURE — 36415 COLL VENOUS BLD VENIPUNCTURE: CPT

## 2023-06-13 PROCEDURE — 93005 ELECTROCARDIOGRAM TRACING: CPT | Performed by: SPECIALIST

## 2023-06-13 PROCEDURE — 80053 COMPREHEN METABOLIC PANEL: CPT

## 2023-06-13 PROCEDURE — 85379 FIBRIN DEGRADATION QUANT: CPT | Performed by: EMERGENCY MEDICINE

## 2023-06-13 PROCEDURE — 85025 COMPLETE CBC W/AUTO DIFF WBC: CPT

## 2023-06-13 PROCEDURE — 84484 ASSAY OF TROPONIN QUANT: CPT

## 2023-06-13 PROCEDURE — 93010 ELECTROCARDIOGRAM REPORT: CPT | Mod: ,,, | Performed by: SPECIALIST

## 2023-06-13 RX ADMIN — SODIUM CHLORIDE 1000 ML: 0.9 INJECTION, SOLUTION INTRAVENOUS at 06:06
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
## 2023-06-19 ENCOUNTER — TELEPHONE (OUTPATIENT)
Dept: PRIMARY CARE CLINIC | Facility: CLINIC | Age: 33
End: 2023-06-19
Payer: MEDICAID

## 2023-06-19 ENCOUNTER — PATIENT MESSAGE (OUTPATIENT)
Dept: PRIMARY CARE CLINIC | Facility: CLINIC | Age: 33
End: 2023-06-19
Payer: MEDICAID

## 2023-06-19 NOTE — TELEPHONE ENCOUNTER
----- Message from Lacy Reyes sent at 6/19/2023 10:53 AM CDT -----  Regarding: abnormal ekg  Contact: [phone]  No blue slot available to schedule an appointment for the patient.  Patient is established with which PCP: JONO RODRIGUEZ   Reason for the visit: ED F U   Would the patient like a call back, or a response through their [facility] portal?:  Callback @ # [phone]

## 2023-06-28 ENCOUNTER — OFFICE VISIT (OUTPATIENT)
Dept: PRIMARY CARE CLINIC | Facility: CLINIC | Age: 33
End: 2023-06-28
Payer: MEDICAID

## 2023-06-28 VITALS
HEART RATE: 105 BPM | BODY MASS INDEX: 32.77 KG/M2 | HEIGHT: 64 IN | OXYGEN SATURATION: 100 % | RESPIRATION RATE: 18 BRPM | DIASTOLIC BLOOD PRESSURE: 78 MMHG | WEIGHT: 191.94 LBS | SYSTOLIC BLOOD PRESSURE: 124 MMHG

## 2023-06-28 DIAGNOSIS — R51.9 NONINTRACTABLE HEADACHE, UNSPECIFIED CHRONICITY PATTERN, UNSPECIFIED HEADACHE TYPE: ICD-10-CM

## 2023-06-28 DIAGNOSIS — Z13.220 ENCOUNTER FOR LIPID SCREENING FOR CARDIOVASCULAR DISEASE: ICD-10-CM

## 2023-06-28 DIAGNOSIS — R00.0 TACHYCARDIA, UNSPECIFIED: Primary | ICD-10-CM

## 2023-06-28 DIAGNOSIS — R07.89 ATYPICAL CHEST PAIN: ICD-10-CM

## 2023-06-28 DIAGNOSIS — M77.8 TENDONITIS OF FINGER: ICD-10-CM

## 2023-06-28 DIAGNOSIS — Z13.6 ENCOUNTER FOR LIPID SCREENING FOR CARDIOVASCULAR DISEASE: ICD-10-CM

## 2023-06-28 PROCEDURE — 3078F PR MOST RECENT DIASTOLIC BLOOD PRESSURE < 80 MM HG: ICD-10-PCS | Mod: CPTII,,, | Performed by: INTERNAL MEDICINE

## 2023-06-28 PROCEDURE — 99215 OFFICE O/P EST HI 40 MIN: CPT | Mod: PBBFAC,PN | Performed by: INTERNAL MEDICINE

## 2023-06-28 PROCEDURE — 1159F MED LIST DOCD IN RCRD: CPT | Mod: CPTII,,, | Performed by: INTERNAL MEDICINE

## 2023-06-28 PROCEDURE — 99999 PR PBB SHADOW E&M-EST. PATIENT-LVL V: CPT | Mod: PBBFAC,,, | Performed by: INTERNAL MEDICINE

## 2023-06-28 PROCEDURE — 1160F RVW MEDS BY RX/DR IN RCRD: CPT | Mod: CPTII,,, | Performed by: INTERNAL MEDICINE

## 2023-06-28 PROCEDURE — 1160F PR REVIEW ALL MEDS BY PRESCRIBER/CLIN PHARMACIST DOCUMENTED: ICD-10-PCS | Mod: CPTII,,, | Performed by: INTERNAL MEDICINE

## 2023-06-28 PROCEDURE — 1159F PR MEDICATION LIST DOCUMENTED IN MEDICAL RECORD: ICD-10-PCS | Mod: CPTII,,, | Performed by: INTERNAL MEDICINE

## 2023-06-28 PROCEDURE — 99214 OFFICE O/P EST MOD 30 MIN: CPT | Mod: S$PBB,,, | Performed by: INTERNAL MEDICINE

## 2023-06-28 PROCEDURE — 3074F PR MOST RECENT SYSTOLIC BLOOD PRESSURE < 130 MM HG: ICD-10-PCS | Mod: CPTII,,, | Performed by: INTERNAL MEDICINE

## 2023-06-28 PROCEDURE — 3074F SYST BP LT 130 MM HG: CPT | Mod: CPTII,,, | Performed by: INTERNAL MEDICINE

## 2023-06-28 PROCEDURE — 3078F DIAST BP <80 MM HG: CPT | Mod: CPTII,,, | Performed by: INTERNAL MEDICINE

## 2023-06-28 PROCEDURE — 3008F BODY MASS INDEX DOCD: CPT | Mod: CPTII,,, | Performed by: INTERNAL MEDICINE

## 2023-06-28 PROCEDURE — 99999 PR PBB SHADOW E&M-EST. PATIENT-LVL V: ICD-10-PCS | Mod: PBBFAC,,, | Performed by: INTERNAL MEDICINE

## 2023-06-28 PROCEDURE — 3008F PR BODY MASS INDEX (BMI) DOCUMENTED: ICD-10-PCS | Mod: CPTII,,, | Performed by: INTERNAL MEDICINE

## 2023-06-28 PROCEDURE — 99214 PR OFFICE/OUTPT VISIT, EST, LEVL IV, 30-39 MIN: ICD-10-PCS | Mod: S$PBB,,, | Performed by: INTERNAL MEDICINE

## 2023-06-28 RX ORDER — DICLOFENAC SODIUM 10 MG/G
GEL TOPICAL
Qty: 100 G | Refills: 2 | Status: SHIPPED | OUTPATIENT
Start: 2023-06-28

## 2023-06-28 RX ORDER — ALBUTEROL SULFATE 90 UG/1
2 AEROSOL, METERED RESPIRATORY (INHALATION) EVERY 4 HOURS PRN
COMMUNITY
Start: 2023-03-01 | End: 2023-06-28 | Stop reason: SDUPTHER

## 2023-06-28 RX ORDER — ALBUTEROL SULFATE 90 UG/1
2 AEROSOL, METERED RESPIRATORY (INHALATION) EVERY 4 HOURS PRN
Qty: 18 G | Refills: 3 | Status: SHIPPED | OUTPATIENT
Start: 2023-06-28

## 2023-06-28 RX ORDER — IBUPROFEN 800 MG/1
800 TABLET ORAL 3 TIMES DAILY
Qty: 45 TABLET | Refills: 1 | Status: SHIPPED | OUTPATIENT
Start: 2023-06-28 | End: 2024-01-09 | Stop reason: SDUPTHER

## 2023-06-28 RX ORDER — OXYBUTYNIN CHLORIDE 5 MG/1
5 TABLET ORAL 3 TIMES DAILY PRN
Qty: 30 TABLET | Refills: 0 | Status: SHIPPED | OUTPATIENT
Start: 2023-06-28 | End: 2023-12-26 | Stop reason: SDUPTHER

## 2023-06-28 RX ORDER — CARIPRAZINE 3 MG/1
3 CAPSULE, GELATIN COATED ORAL
COMMUNITY
Start: 2023-06-26

## 2023-06-28 RX ORDER — ARIPIPRAZOLE 2 MG/1
2 TABLET ORAL NIGHTLY
COMMUNITY
Start: 2023-03-20 | End: 2023-07-13

## 2023-06-29 NOTE — PROGRESS NOTES
Subjective:       Patient ID: Rosa Crowley is a 32 y.o. female.    Chief Complaint: Follow-up (ER follow up ) and Swollen finger  (Right middle finger )    HPI  patient visit today for follow-up from emergency room she was seen for tachycardia and she was told 4 days ago her EKGs abnormal a she deny chest pain short of breath no nausea vomiting no weight gain weight loss she is on Adderall for ADD she deny pregnancy.  She also complained of her right middle finger has been tender and swollen no trauma pain gets worse when see flex or extend the finger no other joint pain or skin rash deny night sweats  Review of Systems   Constitutional:  Negative for unexpected weight change.   Respiratory:  Negative for shortness of breath.    Cardiovascular:  Negative for chest pain.   Gastrointestinal:  Negative for abdominal pain.   Musculoskeletal:  Positive for arthralgias.   Psychiatric/Behavioral:  Negative for dysphoric mood.      Objective:      Physical Exam  Vitals and nursing note reviewed.   Constitutional:       General: She is not in acute distress.     Appearance: She is well-developed.   HENT:      Head: Normocephalic and atraumatic.      Right Ear: External ear normal.      Left Ear: External ear normal.      Nose: Nose normal.      Mouth/Throat:      Pharynx: No oropharyngeal exudate.   Eyes:      Extraocular Movements: Extraocular movements intact.      Conjunctiva/sclera: Conjunctivae normal.      Pupils: Pupils are equal, round, and reactive to light.   Neck:      Thyroid: No thyromegaly.   Cardiovascular:      Rate and Rhythm: Normal rate and regular rhythm.      Heart sounds: Normal heart sounds. No murmur heard.    No friction rub. No gallop.   Pulmonary:      Effort: Pulmonary effort is normal. No respiratory distress.      Breath sounds: Normal breath sounds. No wheezing.   Abdominal:      General: Bowel sounds are normal. There is no distension.      Palpations: Abdomen is soft.       Tenderness: There is no abdominal tenderness.   Musculoskeletal:         General: Tenderness (Tenderness and slight swelling in the right middle finger pain is along the flexor and extensor tendon) present. No deformity. Normal range of motion.      Cervical back: Normal range of motion and neck supple.   Lymphadenopathy:      Cervical: No cervical adenopathy.   Skin:     General: Skin is warm and dry.      Capillary Refill: Capillary refill takes less than 2 seconds.      Findings: No erythema or rash.   Neurological:      Mental Status: She is alert and oriented to person, place, and time.   Psychiatric:         Thought Content: Thought content normal.         Judgment: Judgment normal.       Assessment:       1. Tachycardia, unspecified    2. Nonintractable headache, unspecified chronicity pattern, unspecified headache type    3. Atypical chest pain    4. Encounter for lipid screening for cardiovascular disease    5. Tendonitis of finger        Plan:       Tachycardia, unspecified  -     Holter monitor - 48 hour; Future  -     Echo Saline Bubble? No; Future; Expected date: 06/28/2023    Nonintractable headache, unspecified chronicity pattern, unspecified headache type  -     ibuprofen (ADVIL,MOTRIN) 800 MG tablet; Take 1 tablet (800 mg total) by mouth 3 (three) times daily.  Dispense: 45 tablet; Refill: 1    Atypical chest pain  -     Holter monitor - 48 hour; Future  -     Echo Saline Bubble? No; Future; Expected date: 06/28/2023    Encounter for lipid screening for cardiovascular disease  -     Lipid Panel; Future; Expected date: 06/28/2023    Tendonitis of finger  -     diclofenac sodium (VOLTAREN) 1 % Gel; Apply to painful finger QID  Dispense: 100 g; Refill: 2    Other orders  -     albuterol (PROVENTIL/VENTOLIN HFA) 90 mcg/actuation inhaler; Inhale 2 puffs into the lungs every 4 (four) hours as needed for Shortness of Breath or Wheezing.  Dispense: 18 g; Refill: 3  -     oxybutynin (DITROPAN) 5 MG Tab; Take  1 tablet (5 mg total) by mouth 3 (three) times daily as needed (Bladder spasm/pain).  Dispense: 30 tablet; Refill: 0        Medication List with Changes/Refills   New Medications    DICLOFENAC SODIUM (VOLTAREN) 1 % GEL    Apply to painful finger QID   Current Medications    AMITRIPTYLINE (ELAVIL) 10 MG TABLET    Take 1 tablet (10 mg total) by mouth nightly as needed for Pain.    ARIPIPRAZOLE (ABILIFY) 2 MG TAB    Take 2 mg by mouth every evening.    BUPROPION (WELLBUTRIN XL) 300 MG 24 HR TABLET    Take 450 mg by mouth every evening.    BUSPIRONE (BUSPAR) 15 MG TABLET    Take 1 tablet (15 mg total) by mouth 3 (three) times daily.    BUTALBITAL-ACETAMINOPHEN-CAFFEINE -40 MG (FIORICET, ESGIC) -40 MG PER TABLET    TAKE 1 TABLET BY MOUTH TWICE DAILY AS NEEDED FOR HEADACHE    CLONAZEPAM (KLONOPIN) 0.5 MG TABLET    Take 1 tablet (0.5 mg total) by mouth 3 (three) times daily as needed for Anxiety.    CONJUGATED ESTROGENS (PREMARIN) VAGINAL CREAM    Place a pea-sized amount in vagina every night for 2 weeks, then use 2-3 nights a week    DEXTROAMPHETAMINE-AMPHETAMINE (ADDERALL) 20 MG TABLET    Take by mouth.    ESTRADIOL (VIVELLE-DOT) 0.1 MG/24 HR PTSW    Place 1 patch onto the skin twice a week.    HYDROCODONE-ACETAMINOPHEN (NORCO) 5-325 MG PER TABLET    Take 1 tablet by mouth every 8 (eight) hours as needed for Pain.    HYDROXYZINE HCL (ATARAX) 25 MG TABLET    1-2 tablets every 8 hours as needed    NITROFURANTOIN, MACROCRYSTAL-MONOHYDRATE, (MACROBID) 100 MG CAPSULE    Take 1 capsule (100 mg total) by mouth 2 (two) times daily.    ONDANSETRON (ZOFRAN) 4 MG TABLET    Take 4 mg by mouth every 8 (eight) hours as needed.    ONDANSETRON (ZOFRAN-ODT) 8 MG TBDL    Take 1 tablet (8 mg total) by mouth every 6 (six) hours as needed (nausea).    PROGESTERONE (PROMETRIUM) 200 MG CAPSULE    Take 1 capsule (200 mg total) by mouth nightly.    TRIAMCINOLONE ACETONIDE 0.5% (KENALOG) 0.5 % CREA    Apply topically 3 (three) times  daily as needed.    URIBEL 118-10-40.8-36 MG CAP    Take 1 capsule by mouth 4 (four) times daily as needed (bladder pain).    VRAYLAR 3 MG CAP    Take 3 mg by mouth.   Changed and/or Refilled Medications    Modified Medication Previous Medication    ALBUTEROL (PROVENTIL/VENTOLIN HFA) 90 MCG/ACTUATION INHALER albuterol (PROVENTIL/VENTOLIN HFA) 90 mcg/actuation inhaler       Inhale 2 puffs into the lungs every 4 (four) hours as needed for Shortness of Breath or Wheezing.    Inhale 2 puffs into the lungs every 4 (four) hours as needed.    IBUPROFEN (ADVIL,MOTRIN) 800 MG TABLET ibuprofen (ADVIL,MOTRIN) 800 MG tablet       Take 1 tablet (800 mg total) by mouth 3 (three) times daily.    Take 1 tablet (800 mg total) by mouth 3 (three) times daily.    OXYBUTYNIN (DITROPAN) 5 MG TAB oxybutynin (DITROPAN) 5 MG Tab       Take 1 tablet (5 mg total) by mouth 3 (three) times daily as needed (Bladder spasm/pain).    Take 1 tablet (5 mg total) by mouth 3 (three) times daily as needed (Bladder spasm/pain).

## 2023-07-01 PROCEDURE — 99284 EMERGENCY DEPT VISIT MOD MDM: CPT

## 2023-07-02 ENCOUNTER — PATIENT MESSAGE (OUTPATIENT)
Dept: PRIMARY CARE CLINIC | Facility: CLINIC | Age: 33
End: 2023-07-02
Payer: MEDICAID

## 2023-07-02 ENCOUNTER — HOSPITAL ENCOUNTER (EMERGENCY)
Facility: HOSPITAL | Age: 33
Discharge: HOME | End: 2023-07-02
Attending: EMERGENCY MEDICINE
Payer: MEDICAID

## 2023-07-02 VITALS
OXYGEN SATURATION: 99 % | WEIGHT: 190 LBS | TEMPERATURE: 98 F | HEART RATE: 95 BPM | SYSTOLIC BLOOD PRESSURE: 148 MMHG | RESPIRATION RATE: 18 BRPM | BODY MASS INDEX: 32.44 KG/M2 | DIASTOLIC BLOOD PRESSURE: 91 MMHG | HEIGHT: 64 IN

## 2023-07-02 DIAGNOSIS — R07.9 CHEST PAIN: Primary | ICD-10-CM

## 2023-07-02 DIAGNOSIS — M79.605 LEFT LEG PAIN: ICD-10-CM

## 2023-07-02 DIAGNOSIS — R10.11 RIGHT UPPER QUADRANT ABDOMINAL PAIN: ICD-10-CM

## 2023-07-02 LAB
ALBUMIN SERPL BCP-MCNC: 4 G/DL (ref 3.5–5.2)
ALP SERPL-CCNC: 69 U/L (ref 55–135)
ALT SERPL W/O P-5'-P-CCNC: 11 U/L (ref 10–44)
ANION GAP SERPL CALC-SCNC: 8 MMOL/L (ref 8–16)
AST SERPL-CCNC: 9 U/L (ref 10–40)
BASOPHILS # BLD AUTO: 0.03 K/UL (ref 0–0.2)
BASOPHILS NFR BLD: 0.6 % (ref 0–1.9)
BILIRUB SERPL-MCNC: 0.5 MG/DL (ref 0.1–1)
BUN SERPL-MCNC: 8 MG/DL (ref 6–20)
CALCIUM SERPL-MCNC: 9 MG/DL (ref 8.7–10.5)
CHLORIDE SERPL-SCNC: 107 MMOL/L (ref 95–110)
CO2 SERPL-SCNC: 23 MMOL/L (ref 23–29)
CREAT SERPL-MCNC: 0.7 MG/DL (ref 0.5–1.4)
D DIMER PPP IA.FEU-MCNC: <0.19 MG/L FEU
DIFFERENTIAL METHOD: ABNORMAL
EOSINOPHIL # BLD AUTO: 0.3 K/UL (ref 0–0.5)
EOSINOPHIL NFR BLD: 5.4 % (ref 0–8)
ERYTHROCYTE [DISTWIDTH] IN BLOOD BY AUTOMATED COUNT: 13.2 % (ref 11.5–14.5)
EST. GFR  (NO RACE VARIABLE): >60 ML/MIN/1.73 M^2
GLUCOSE SERPL-MCNC: 97 MG/DL (ref 70–110)
HCT VFR BLD AUTO: 31.4 % (ref 37–48.5)
HGB BLD-MCNC: 11.3 G/DL (ref 12–16)
IMM GRANULOCYTES # BLD AUTO: 0.01 K/UL (ref 0–0.04)
IMM GRANULOCYTES NFR BLD AUTO: 0.2 % (ref 0–0.5)
LIPASE SERPL-CCNC: 34 U/L (ref 4–60)
LYMPHOCYTES # BLD AUTO: 1.8 K/UL (ref 1–4.8)
LYMPHOCYTES NFR BLD: 37.9 % (ref 18–48)
MCH RBC QN AUTO: 32.5 PG (ref 27–31)
MCHC RBC AUTO-ENTMCNC: 36 G/DL (ref 32–36)
MCV RBC AUTO: 90 FL (ref 82–98)
MONOCYTES # BLD AUTO: 0.4 K/UL (ref 0.3–1)
MONOCYTES NFR BLD: 8.6 % (ref 4–15)
NEUTROPHILS # BLD AUTO: 2.2 K/UL (ref 1.8–7.7)
NEUTROPHILS NFR BLD: 47.3 % (ref 38–73)
NRBC BLD-RTO: 0 /100 WBC
PLATELET # BLD AUTO: 209 K/UL (ref 150–450)
PMV BLD AUTO: 9.3 FL (ref 9.2–12.9)
POTASSIUM SERPL-SCNC: 3.8 MMOL/L (ref 3.5–5.1)
PROT SERPL-MCNC: 6.8 G/DL (ref 6–8.4)
RBC # BLD AUTO: 3.48 M/UL (ref 4–5.4)
SODIUM SERPL-SCNC: 138 MMOL/L (ref 136–145)
WBC # BLD AUTO: 4.64 K/UL (ref 3.9–12.7)

## 2023-07-02 PROCEDURE — 36415 COLL VENOUS BLD VENIPUNCTURE: CPT | Performed by: EMERGENCY MEDICINE

## 2023-07-02 PROCEDURE — 85379 FIBRIN DEGRADATION QUANT: CPT | Performed by: EMERGENCY MEDICINE

## 2023-07-02 PROCEDURE — 93005 ELECTROCARDIOGRAM TRACING: CPT

## 2023-07-02 PROCEDURE — 63600175 PHARM REV CODE 636 W HCPCS: Performed by: EMERGENCY MEDICINE

## 2023-07-02 PROCEDURE — 85025 COMPLETE CBC W/AUTO DIFF WBC: CPT | Performed by: EMERGENCY MEDICINE

## 2023-07-02 PROCEDURE — 93010 EKG 12-LEAD: ICD-10-PCS | Mod: ,,, | Performed by: INTERNAL MEDICINE

## 2023-07-02 PROCEDURE — 83690 ASSAY OF LIPASE: CPT | Performed by: EMERGENCY MEDICINE

## 2023-07-02 PROCEDURE — 93010 ELECTROCARDIOGRAM REPORT: CPT | Mod: ,,, | Performed by: INTERNAL MEDICINE

## 2023-07-02 PROCEDURE — 80053 COMPREHEN METABOLIC PANEL: CPT | Performed by: EMERGENCY MEDICINE

## 2023-07-02 PROCEDURE — 96372 THER/PROPH/DIAG INJ SC/IM: CPT | Performed by: EMERGENCY MEDICINE

## 2023-07-02 RX ORDER — KETOROLAC TROMETHAMINE 30 MG/ML
30 INJECTION, SOLUTION INTRAMUSCULAR; INTRAVENOUS
Status: COMPLETED | OUTPATIENT
Start: 2023-07-02 | End: 2023-07-02

## 2023-07-02 RX ADMIN — KETOROLAC TROMETHAMINE 30 MG: 30 INJECTION, SOLUTION INTRAMUSCULAR at 03:07

## 2023-07-02 NOTE — ED NOTES
Rosa presents to the ED with a c/o chest pain, right shoulder pain and left leg pain.  Pt states that she is due to wear a heart monitor for her abnormal EKG and also have a ECHO done.  Pt reports no injury.  No other complaints at this time.
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
## 2023-07-02 NOTE — DISCHARGE INSTRUCTIONS
I believe you have gallstones and need further evaluation by her primary care doctor with an ultrasound.  Your leg pain appears to be nerve pain from the peroneal nerve going down the leg.  This might be secondary to rheumatoid arthritis.  You can take Tylenol and ibuprofen for pain.  Return the ER for increasing abdominal pain fevers weakness numbness chest pain shortness breath or any other concerns.

## 2023-07-03 ENCOUNTER — OFFICE VISIT (OUTPATIENT)
Dept: PRIMARY CARE CLINIC | Facility: CLINIC | Age: 33
End: 2023-07-03
Payer: MEDICAID

## 2023-07-03 ENCOUNTER — TELEPHONE (OUTPATIENT)
Dept: SURGERY | Facility: CLINIC | Age: 33
End: 2023-07-03
Payer: MEDICAID

## 2023-07-03 ENCOUNTER — TELEPHONE (OUTPATIENT)
Dept: PRIMARY CARE CLINIC | Facility: CLINIC | Age: 33
End: 2023-07-03

## 2023-07-03 ENCOUNTER — HOSPITAL ENCOUNTER (OUTPATIENT)
Dept: RADIOLOGY | Facility: HOSPITAL | Age: 33
Discharge: HOME | End: 2023-07-03
Attending: INTERNAL MEDICINE
Payer: MEDICAID

## 2023-07-03 VITALS
SYSTOLIC BLOOD PRESSURE: 118 MMHG | BODY MASS INDEX: 33.31 KG/M2 | TEMPERATURE: 99 F | DIASTOLIC BLOOD PRESSURE: 76 MMHG | RESPIRATION RATE: 17 BRPM | HEART RATE: 99 BPM | HEIGHT: 64 IN | OXYGEN SATURATION: 100 % | WEIGHT: 195.13 LBS

## 2023-07-03 DIAGNOSIS — R10.11 RIGHT UPPER QUADRANT ABDOMINAL PAIN: ICD-10-CM

## 2023-07-03 DIAGNOSIS — R10.11 RIGHT UPPER QUADRANT ABDOMINAL PAIN: Primary | ICD-10-CM

## 2023-07-03 PROCEDURE — 3078F DIAST BP <80 MM HG: CPT | Mod: CPTII,,, | Performed by: INTERNAL MEDICINE

## 2023-07-03 PROCEDURE — 1160F RVW MEDS BY RX/DR IN RCRD: CPT | Mod: CPTII,,, | Performed by: INTERNAL MEDICINE

## 2023-07-03 PROCEDURE — 99214 OFFICE O/P EST MOD 30 MIN: CPT | Mod: S$PBB,,, | Performed by: INTERNAL MEDICINE

## 2023-07-03 PROCEDURE — 99214 OFFICE O/P EST MOD 30 MIN: CPT | Mod: PBBFAC,PN | Performed by: INTERNAL MEDICINE

## 2023-07-03 PROCEDURE — 3008F BODY MASS INDEX DOCD: CPT | Mod: CPTII,,, | Performed by: INTERNAL MEDICINE

## 2023-07-03 PROCEDURE — 99999 PR PBB SHADOW E&M-EST. PATIENT-LVL IV: ICD-10-PCS | Mod: PBBFAC,,, | Performed by: INTERNAL MEDICINE

## 2023-07-03 PROCEDURE — 3074F PR MOST RECENT SYSTOLIC BLOOD PRESSURE < 130 MM HG: ICD-10-PCS | Mod: CPTII,,, | Performed by: INTERNAL MEDICINE

## 2023-07-03 PROCEDURE — 76700 US EXAM ABDOM COMPLETE: CPT | Mod: TC

## 2023-07-03 PROCEDURE — 3078F PR MOST RECENT DIASTOLIC BLOOD PRESSURE < 80 MM HG: ICD-10-PCS | Mod: CPTII,,, | Performed by: INTERNAL MEDICINE

## 2023-07-03 PROCEDURE — 3074F SYST BP LT 130 MM HG: CPT | Mod: CPTII,,, | Performed by: INTERNAL MEDICINE

## 2023-07-03 PROCEDURE — 99214 PR OFFICE/OUTPT VISIT, EST, LEVL IV, 30-39 MIN: ICD-10-PCS | Mod: S$PBB,,, | Performed by: INTERNAL MEDICINE

## 2023-07-03 PROCEDURE — 1159F MED LIST DOCD IN RCRD: CPT | Mod: CPTII,,, | Performed by: INTERNAL MEDICINE

## 2023-07-03 PROCEDURE — 99999 PR PBB SHADOW E&M-EST. PATIENT-LVL IV: CPT | Mod: PBBFAC,,, | Performed by: INTERNAL MEDICINE

## 2023-07-03 PROCEDURE — 3008F PR BODY MASS INDEX (BMI) DOCUMENTED: ICD-10-PCS | Mod: CPTII,,, | Performed by: INTERNAL MEDICINE

## 2023-07-03 PROCEDURE — 1159F PR MEDICATION LIST DOCUMENTED IN MEDICAL RECORD: ICD-10-PCS | Mod: CPTII,,, | Performed by: INTERNAL MEDICINE

## 2023-07-03 PROCEDURE — 1160F PR REVIEW ALL MEDS BY PRESCRIBER/CLIN PHARMACIST DOCUMENTED: ICD-10-PCS | Mod: CPTII,,, | Performed by: INTERNAL MEDICINE

## 2023-07-03 RX ORDER — OMEPRAZOLE 40 MG/1
40 CAPSULE, DELAYED RELEASE ORAL DAILY
Qty: 30 CAPSULE | Refills: 11 | Status: SHIPPED | OUTPATIENT
Start: 2023-07-03 | End: 2023-12-26

## 2023-07-03 RX ORDER — ONDANSETRON 8 MG/1
8 TABLET, ORALLY DISINTEGRATING ORAL EVERY 6 HOURS PRN
Qty: 10 TABLET | Refills: 1 | Status: SHIPPED | OUTPATIENT
Start: 2023-07-03 | End: 2023-12-26

## 2023-07-03 RX ORDER — HYDROCODONE BITARTRATE AND ACETAMINOPHEN 5; 325 MG/1; MG/1
1 TABLET ORAL EVERY 12 HOURS PRN
Qty: 14 TABLET | Refills: 0 | Status: SHIPPED | OUTPATIENT
Start: 2023-07-03 | End: 2023-12-26

## 2023-07-03 NOTE — TELEPHONE ENCOUNTER
----- Message from Renetta Munoz sent at 7/3/2023  4:44 PM CDT -----  Contact: [phone]  Pt called to advise that at her appointment this morning she was told something for pain would be sent to the pharmacy. Pt says when she checked with the pharmacy nothing was sent. Please Advise       [id number] - [place] - [address] AT [facility] [address]  [facility] [address]  Phone: [phone] Fax: [phone]

## 2023-07-03 NOTE — PROGRESS NOTES
Subjective:       Patient ID: Rosa Crowley is a 32 y.o. female.    Chief Complaint: Hospital Follow Up (ER Visit, possible gallstones)    HPI  Pt visit today for f/u from ER she seen for RUQ pain that radiate to rt shouder blade and the neck pain get worse after she ate or take deep breath or put pressure in the RUQ . Review labs from ER anemia WBC normal pt had u/s done manually by ER physician since no technian to do u/s told gall stones but send home with out medications pt TINO mayorga for f/u she still ha ssame pain with nausea no sob cp no diarrhea . She states both her mom and sister had gall bladder removed. Pt had hysterectomy.   Review of Systems    Objective:      Physical Exam  Vitals and nursing note reviewed.   Constitutional:       General: She is not in acute distress.     Appearance: She is well-developed.   HENT:      Head: Normocephalic and atraumatic.      Right Ear: External ear normal.      Left Ear: External ear normal.      Nose: Nose normal.      Mouth/Throat:      Pharynx: No oropharyngeal exudate.   Eyes:      Extraocular Movements: Extraocular movements intact.      Conjunctiva/sclera: Conjunctivae normal.      Pupils: Pupils are equal, round, and reactive to light.   Neck:      Thyroid: No thyromegaly.   Cardiovascular:      Rate and Rhythm: Normal rate and regular rhythm.      Heart sounds: Normal heart sounds. No murmur heard.    No friction rub. No gallop.   Pulmonary:      Effort: Pulmonary effort is normal. No respiratory distress.      Breath sounds: Normal breath sounds. No wheezing.   Abdominal:      General: Bowel sounds are normal. There is no distension.      Palpations: Abdomen is soft.      Tenderness: There is abdominal tenderness (tenderness with palpatio RUQ ? chappell sign).   Musculoskeletal:         General: No tenderness or deformity. Normal range of motion.      Cervical back: Normal range of motion and neck supple.   Lymphadenopathy:      Cervical: No  cervical adenopathy.   Skin:     General: Skin is warm and dry.      Capillary Refill: Capillary refill takes less than 2 seconds.      Findings: No erythema or rash.   Neurological:      Mental Status: She is alert and oriented to person, place, and time.   Psychiatric:         Thought Content: Thought content normal.         Judgment: Judgment normal.       Assessment:       1. Right upper quadrant abdominal pain        Plan:       Right upper quadrant abdominal pain  Comments:  need abd u/s to ER if worsen consult surgery need U/A too  Orders:  -     Ambulatory referral/consult to General Surgery; Future; Expected date: 07/03/2023  -     US Abdomen Complete; Future; Expected date: 07/03/2023  -     HYDROcodone-acetaminophen (NORCO) 5-325 mg per tablet; Take 1 tablet by mouth every 12 (twelve) hours as needed for Pain.  Dispense: 14 tablet; Refill: 0  -     ondansetron (ZOFRAN-ODT) 8 MG TbDL; Take 1 tablet (8 mg total) by mouth every 6 (six) hours as needed (nausea vomitting).  Dispense: 10 tablet; Refill: 1  -     omeprazole (PRILOSEC) 40 MG capsule; Take 1 capsule (40 mg total) by mouth once daily.  Dispense: 30 capsule; Refill: 11  -     Urinalysis; Future; Expected date: 07/03/2023        Medication List with Changes/Refills   New Medications    HYDROCODONE-ACETAMINOPHEN (NORCO) 5-325 MG PER TABLET    Take 1 tablet by mouth every 12 (twelve) hours as needed for Pain.    OMEPRAZOLE (PRILOSEC) 40 MG CAPSULE    Take 1 capsule (40 mg total) by mouth once daily.    ONDANSETRON (ZOFRAN-ODT) 8 MG TBDL    Take 1 tablet (8 mg total) by mouth every 6 (six) hours as needed (nausea vomitting).   Current Medications    ALBUTEROL (PROVENTIL/VENTOLIN HFA) 90 MCG/ACTUATION INHALER    Inhale 2 puffs into the lungs every 4 (four) hours as needed for Shortness of Breath or Wheezing.    AMITRIPTYLINE (ELAVIL) 10 MG TABLET    Take 1 tablet (10 mg total) by mouth nightly as needed for Pain.    ARIPIPRAZOLE (ABILIFY) 2 MG TAB     Take 2 mg by mouth every evening.    BUPROPION (WELLBUTRIN XL) 300 MG 24 HR TABLET    Take 450 mg by mouth every evening.    BUSPIRONE (BUSPAR) 15 MG TABLET    Take 1 tablet (15 mg total) by mouth 3 (three) times daily.    BUTALBITAL-ACETAMINOPHEN-CAFFEINE -40 MG (FIORICET, ESGIC) -40 MG PER TABLET    TAKE 1 TABLET BY MOUTH TWICE DAILY AS NEEDED FOR HEADACHE    DEXTROAMPHETAMINE-AMPHETAMINE (ADDERALL) 20 MG TABLET    Take by mouth.    DICLOFENAC SODIUM (VOLTAREN) 1 % GEL    Apply to painful finger QID    ESTRADIOL (VIVELLE-DOT) 0.1 MG/24 HR PTSW    Place 1 patch onto the skin twice a week.    IBUPROFEN (ADVIL,MOTRIN) 800 MG TABLET    Take 1 tablet (800 mg total) by mouth 3 (three) times daily.    OXYBUTYNIN (DITROPAN) 5 MG TAB    Take 1 tablet (5 mg total) by mouth 3 (three) times daily as needed (Bladder spasm/pain).    PROGESTERONE (PROMETRIUM) 200 MG CAPSULE    Take 1 capsule (200 mg total) by mouth nightly.    URIBEL 118-10-40.8-36 MG CAP    Take 1 capsule by mouth 4 (four) times daily as needed (bladder pain).    VRAYLAR 3 MG CAP    Take 3 mg by mouth.   Discontinued Medications    CLONAZEPAM (KLONOPIN) 0.5 MG TABLET    Take 1 tablet (0.5 mg total) by mouth 3 (three) times daily as needed for Anxiety.    ONDANSETRON (ZOFRAN) 4 MG TABLET    Take 4 mg by mouth every 8 (eight) hours as needed.

## 2023-07-03 NOTE — TELEPHONE ENCOUNTER
Spoke with patient. Stated that she has been experiencing RUQ pain. She is scheduled this afternoon for an ultrasound. Patient was made aware that the provider will be out of the clinic until 7/11. Appointment has been scheduled. She was instructed to avoid fatty, greasy, spicy foods to avoid flare ups of the condition should the issue be her gallbladder. A bland diet would be beneficial. If she should experience pain that does not resolve before she is seen by the provider she should go to the ED for evaluation immediately. Patient verbalized understanding. No further issues discussed.

## 2023-07-03 NOTE — TELEPHONE ENCOUNTER
----- Message from Nini Keating sent at 7/3/2023 12:20 PM CDT -----  R10.11 (ICD-10-CM) - Right upper quadrant abdominal pain patient having ultrasound of abdomen today and needs a stat appointment asap please call patient back to schedule

## 2023-07-03 NOTE — TELEPHONE ENCOUNTER
Spoke with patient in regards to her medication and patient was informed that Dr. Morocho will be sending in her medication. Patient verbalized understanding. Patient said she got her results back from her ultrasound and it's telling her that her gallbladder is normal and she is having pain in her gallbladder. She would like to know what is the next steps. Please advise.

## 2023-07-05 ENCOUNTER — TELEPHONE (OUTPATIENT)
Dept: PRIMARY CARE CLINIC | Facility: CLINIC | Age: 33
End: 2023-07-05
Payer: MEDICAID

## 2023-07-05 ENCOUNTER — PATIENT MESSAGE (OUTPATIENT)
Dept: PRIMARY CARE CLINIC | Facility: CLINIC | Age: 33
End: 2023-07-05
Payer: MEDICAID

## 2023-07-05 DIAGNOSIS — M06.9 RHEUMATOID ARTHRITIS, INVOLVING UNSPECIFIED SITE, UNSPECIFIED WHETHER RHEUMATOID FACTOR PRESENT: Primary | ICD-10-CM

## 2023-07-05 NOTE — TELEPHONE ENCOUNTER
Called patient to inform her that Dr. Morocho has placed more orders and she can go get them done. Patient verbalized understanding.

## 2023-07-05 NOTE — TELEPHONE ENCOUNTER
----- Message from Cristina Mg sent at 7/5/2023  8:59 AM CDT -----  Contact: pt [phone]  Patient is returning a phone call.  Who left a message for the patient: Nikki Choudhury MA   Does patient know what this is regarding: n/a  Would you like a call back, or a response through your [facility] portal?:   call     Please call and advise.    Thank You

## 2023-07-05 NOTE — TELEPHONE ENCOUNTER
Spoke with patient in regards to the message Dr. Morocho sent about her awaiting her appointment with Dr. Alvarado. Patient verbalized understanding. Patient said she though you all talked about her getting her arthritis levels check. Please advise.

## 2023-07-05 NOTE — TELEPHONE ENCOUNTER
Called patient to inform her that said to keep appointment with Dr. Alvarado and to also get a U/A done at the hospital. Patient didn't answer to I LVM.

## 2023-07-06 ENCOUNTER — PATIENT MESSAGE (OUTPATIENT)
Dept: OBSTETRICS AND GYNECOLOGY | Facility: CLINIC | Age: 33
End: 2023-07-06
Payer: MEDICAID

## 2023-07-07 ENCOUNTER — PATIENT MESSAGE (OUTPATIENT)
Dept: PRIMARY CARE CLINIC | Facility: CLINIC | Age: 33
End: 2023-07-07
Payer: MEDICAID

## 2023-07-10 ENCOUNTER — PATIENT MESSAGE (OUTPATIENT)
Dept: PRIMARY CARE CLINIC | Facility: CLINIC | Age: 33
End: 2023-07-10
Payer: MEDICAID

## 2023-07-11 ENCOUNTER — OFFICE VISIT (OUTPATIENT)
Dept: PRIMARY CARE CLINIC | Facility: CLINIC | Age: 33
End: 2023-07-11
Payer: MEDICAID

## 2023-07-11 ENCOUNTER — PATIENT MESSAGE (OUTPATIENT)
Dept: PRIMARY CARE CLINIC | Facility: CLINIC | Age: 33
End: 2023-07-11
Payer: MEDICAID

## 2023-07-11 ENCOUNTER — OFFICE VISIT (OUTPATIENT)
Dept: SURGERY | Facility: CLINIC | Age: 33
End: 2023-07-11
Payer: MEDICAID

## 2023-07-11 VITALS
SYSTOLIC BLOOD PRESSURE: 131 MMHG | WEIGHT: 196.75 LBS | HEART RATE: 105 BPM | BODY MASS INDEX: 33.77 KG/M2 | DIASTOLIC BLOOD PRESSURE: 85 MMHG

## 2023-07-11 DIAGNOSIS — K80.42 CALCULUS OF BILE DUCT WITH ACUTE CHOLECYSTITIS WITHOUT OBSTRUCTION: Primary | ICD-10-CM

## 2023-07-11 DIAGNOSIS — M06.9 RHEUMATOID ARTHRITIS, INVOLVING UNSPECIFIED SITE, UNSPECIFIED WHETHER RHEUMATOID FACTOR PRESENT: Primary | ICD-10-CM

## 2023-07-11 DIAGNOSIS — M77.8 TENDONITIS OF FINGER: ICD-10-CM

## 2023-07-11 DIAGNOSIS — R10.11 RIGHT UPPER QUADRANT ABDOMINAL PAIN: ICD-10-CM

## 2023-07-11 PROCEDURE — 3075F SYST BP GE 130 - 139MM HG: CPT | Mod: CPTII,,, | Performed by: SURGERY

## 2023-07-11 PROCEDURE — 3008F BODY MASS INDEX DOCD: CPT | Mod: CPTII,,, | Performed by: SURGERY

## 2023-07-11 PROCEDURE — 1160F RVW MEDS BY RX/DR IN RCRD: CPT | Mod: CPTII,95,, | Performed by: INTERNAL MEDICINE

## 2023-07-11 PROCEDURE — 3079F DIAST BP 80-89 MM HG: CPT | Mod: CPTII,,, | Performed by: SURGERY

## 2023-07-11 PROCEDURE — 1160F PR REVIEW ALL MEDS BY PRESCRIBER/CLIN PHARMACIST DOCUMENTED: ICD-10-PCS | Mod: CPTII,,, | Performed by: SURGERY

## 2023-07-11 PROCEDURE — 99213 PR OFFICE/OUTPT VISIT, EST, LEVL III, 20-29 MIN: ICD-10-PCS | Mod: 95,,, | Performed by: INTERNAL MEDICINE

## 2023-07-11 PROCEDURE — 99203 OFFICE O/P NEW LOW 30 MIN: CPT | Mod: S$PBB,,, | Performed by: SURGERY

## 2023-07-11 PROCEDURE — 1159F MED LIST DOCD IN RCRD: CPT | Mod: CPTII,95,, | Performed by: INTERNAL MEDICINE

## 2023-07-11 PROCEDURE — 1159F MED LIST DOCD IN RCRD: CPT | Mod: CPTII,,, | Performed by: SURGERY

## 2023-07-11 PROCEDURE — 1160F PR REVIEW ALL MEDS BY PRESCRIBER/CLIN PHARMACIST DOCUMENTED: ICD-10-PCS | Mod: CPTII,95,, | Performed by: INTERNAL MEDICINE

## 2023-07-11 PROCEDURE — 99215 OFFICE O/P EST HI 40 MIN: CPT | Mod: PBBFAC,PN | Performed by: SURGERY

## 2023-07-11 PROCEDURE — 99203 PR OFFICE/OUTPT VISIT, NEW, LEVL III, 30-44 MIN: ICD-10-PCS | Mod: S$PBB,,, | Performed by: SURGERY

## 2023-07-11 PROCEDURE — 1159F PR MEDICATION LIST DOCUMENTED IN MEDICAL RECORD: ICD-10-PCS | Mod: CPTII,95,, | Performed by: INTERNAL MEDICINE

## 2023-07-11 PROCEDURE — 99999 PR PBB SHADOW E&M-EST. PATIENT-LVL V: CPT | Mod: PBBFAC,,, | Performed by: SURGERY

## 2023-07-11 PROCEDURE — 3075F PR MOST RECENT SYSTOLIC BLOOD PRESS GE 130-139MM HG: ICD-10-PCS | Mod: CPTII,,, | Performed by: SURGERY

## 2023-07-11 PROCEDURE — 1160F RVW MEDS BY RX/DR IN RCRD: CPT | Mod: CPTII,,, | Performed by: SURGERY

## 2023-07-11 PROCEDURE — 3008F PR BODY MASS INDEX (BMI) DOCUMENTED: ICD-10-PCS | Mod: CPTII,,, | Performed by: SURGERY

## 2023-07-11 PROCEDURE — 3079F PR MOST RECENT DIASTOLIC BLOOD PRESSURE 80-89 MM HG: ICD-10-PCS | Mod: CPTII,,, | Performed by: SURGERY

## 2023-07-11 PROCEDURE — 99999 PR PBB SHADOW E&M-EST. PATIENT-LVL V: ICD-10-PCS | Mod: PBBFAC,,, | Performed by: SURGERY

## 2023-07-11 PROCEDURE — 99213 OFFICE O/P EST LOW 20 MIN: CPT | Mod: 95,,, | Performed by: INTERNAL MEDICINE

## 2023-07-11 PROCEDURE — 1159F PR MEDICATION LIST DOCUMENTED IN MEDICAL RECORD: ICD-10-PCS | Mod: CPTII,,, | Performed by: SURGERY

## 2023-07-11 RX ORDER — DEXAMETHASONE 4 MG/1
4 TABLET ORAL EVERY 12 HOURS
Qty: 14 TABLET | Refills: 1 | Status: SHIPPED | OUTPATIENT
Start: 2023-07-11 | End: 2023-07-12 | Stop reason: SDUPTHER

## 2023-07-11 NOTE — PROGRESS NOTES
Subjective:    The patient location is: home  The chief complaint leading to consultation is: f/u abd pain RA    Visit type: audiovisual    Face to Face time with patient: 17  minutes of total time spent on the encounter, which includes face to face time and non-face to face time preparing to see the patient (eg, review of tests), Obtaining and/or reviewing separately obtained history, Documenting clinical information in the electronic or other health record, Independently interpreting results (not separately reported) and communicating results to the patient/family/caregiver, or Care coordination (not separately reported).         Each patient to whom he or she provides medical services by telemedicine is:  (1) informed of the relationship between the physician and patient and the respective role of any other health care provider with respect to management of the patient; and (2) notified that he or she may decline to receive medical services by telemedicine and may withdraw from such care at any time.    Notes:     Patient ID: Rosa Crowley is a 32 y.o. female.    Chief Complaint: No chief complaint on file.    HPI  Pt visit today for f/u she has abd pain epigastric abd u/s normal HIDA scan scheduled labs sl positive ELAINE and Lupus profile negative sl high RF pt denies fever chill no n/v/d no sob cp  Review of Systems    Objective:      Physical Exam  Constitutional:       General: She is not in acute distress.     Appearance: Normal appearance.   HENT:      Head: Atraumatic.      Mouth/Throat:      Mouth: Mucous membranes are dry.   Eyes:      Extraocular Movements: Extraocular movements intact.   Pulmonary:      Effort: Pulmonary effort is normal.   Abdominal:      Tenderness: There is no abdominal tenderness.   Neurological:      Mental Status: She is alert.   Psychiatric:         Mood and Affect: Mood normal.         Thought Content: Thought content normal.         Judgment: Judgment normal.        Assessment:       1. Rheumatoid arthritis, involving unspecified site, unspecified whether rheumatoid factor present    2. Tendonitis of finger        Plan:       Rheumatoid arthritis, involving unspecified site, unspecified whether rheumatoid factor present  Comments:  will try short course of po steroid  Orders:  -     Discontinue: dexAMETHasone (DECADRON) 4 MG Tab; Take 1 tablet (4 mg total) by mouth every 12 (twelve) hours.  Dispense: 14 tablet; Refill: 1    Tendonitis of finger  Comments:  try short course po steroid  if not better need further w/u MRI PT  Orders:  -     Discontinue: dexAMETHasone (DECADRON) 4 MG Tab; Take 1 tablet (4 mg total) by mouth every 12 (twelve) hours.  Dispense: 14 tablet; Refill: 1        Medication List with Changes/Refills   New Medications    DEXAMETHASONE (DECADRON) 4 MG TAB    Take 1 tablet (4 mg total) by mouth every 12 (twelve) hours.   Current Medications    ALBUTEROL (PROVENTIL/VENTOLIN HFA) 90 MCG/ACTUATION INHALER    Inhale 2 puffs into the lungs every 4 (four) hours as needed for Shortness of Breath or Wheezing.    AMITRIPTYLINE (ELAVIL) 10 MG TABLET    Take 1 tablet (10 mg total) by mouth nightly as needed for Pain.    ARIPIPRAZOLE (ABILIFY) 2 MG TAB    Take 2 mg by mouth every evening.    BUPROPION (WELLBUTRIN XL) 300 MG 24 HR TABLET    Take 450 mg by mouth every evening.    BUSPIRONE (BUSPAR) 15 MG TABLET    Take 1 tablet (15 mg total) by mouth 3 (three) times daily.    BUTALBITAL-ACETAMINOPHEN-CAFFEINE -40 MG (FIORICET, ESGIC) -40 MG PER TABLET    TAKE 1 TABLET BY MOUTH TWICE DAILY AS NEEDED FOR HEADACHE    DEXTROAMPHETAMINE-AMPHETAMINE (ADDERALL) 20 MG TABLET    Take by mouth.    DICLOFENAC SODIUM (VOLTAREN) 1 % GEL    Apply to painful finger QID    ESTRADIOL (VIVELLE-DOT) 0.1 MG/24 HR PTSW    Place 1 patch onto the skin twice a week.    HYDROCODONE-ACETAMINOPHEN (NORCO) 5-325 MG PER TABLET    Take 1 tablet by mouth every 12 (twelve) hours as needed for  Pain.    IBUPROFEN (ADVIL,MOTRIN) 800 MG TABLET    Take 1 tablet (800 mg total) by mouth 3 (three) times daily.    OMEPRAZOLE (PRILOSEC) 40 MG CAPSULE    Take 1 capsule (40 mg total) by mouth once daily.    ONDANSETRON (ZOFRAN-ODT) 8 MG TBDL    Take 1 tablet (8 mg total) by mouth every 6 (six) hours as needed (nausea vomitting).    OXYBUTYNIN (DITROPAN) 5 MG TAB    Take 1 tablet (5 mg total) by mouth 3 (three) times daily as needed (Bladder spasm/pain).    PROGESTERONE (PROMETRIUM) 200 MG CAPSULE    Take 1 capsule (200 mg total) by mouth nightly.    URIBEL 118-10-40.8-36 MG CAP    Take 1 capsule by mouth 4 (four) times daily as needed (bladder pain).    VRAYLAR 3 MG CAP    Take 3 mg by mouth.

## 2023-07-12 ENCOUNTER — PATIENT MESSAGE (OUTPATIENT)
Dept: PRIMARY CARE CLINIC | Facility: CLINIC | Age: 33
End: 2023-07-12
Payer: MEDICAID

## 2023-07-12 DIAGNOSIS — M77.8 TENDONITIS OF FINGER: ICD-10-CM

## 2023-07-12 DIAGNOSIS — R35.0 URINE FREQUENCY: Primary | ICD-10-CM

## 2023-07-12 DIAGNOSIS — M06.9 RHEUMATOID ARTHRITIS, INVOLVING UNSPECIFIED SITE, UNSPECIFIED WHETHER RHEUMATOID FACTOR PRESENT: ICD-10-CM

## 2023-07-12 NOTE — TELEPHONE ENCOUNTER
No care due was identified.  [facility] Embedded Care Due Messages. Reference number: [id number].   7/12/2023 4:15:29 PM CDT

## 2023-07-13 RX ORDER — DEXAMETHASONE 4 MG/1
4 TABLET ORAL EVERY 12 HOURS
Qty: 14 TABLET | Refills: 1 | Status: ON HOLD | OUTPATIENT
Start: 2023-07-13 | End: 2023-09-27 | Stop reason: CLARIF

## 2023-07-14 ENCOUNTER — PATIENT MESSAGE (OUTPATIENT)
Dept: OBSTETRICS AND GYNECOLOGY | Facility: CLINIC | Age: 33
End: 2023-07-14
Payer: MEDICAID

## 2023-07-17 ENCOUNTER — PATIENT MESSAGE (OUTPATIENT)
Dept: PRIMARY CARE CLINIC | Facility: CLINIC | Age: 33
End: 2023-07-17
Payer: MEDICAID

## 2023-07-17 DIAGNOSIS — R00.0 TACHYCARDIA, UNSPECIFIED: Primary | ICD-10-CM
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
## 2023-07-18 NOTE — TELEPHONE ENCOUNTER
Yes I agree will refer her to cardiology I put in order can you get her appt and she may need to stop Adderall if tachycardia persist

## 2023-07-31 ENCOUNTER — PATIENT MESSAGE (OUTPATIENT)
Dept: SURGERY | Facility: CLINIC | Age: 33
End: 2023-07-31
Payer: MEDICAID

## 2023-08-03 ENCOUNTER — OFFICE VISIT (OUTPATIENT)
Dept: SURGERY | Facility: CLINIC | Age: 33
End: 2023-08-03
Payer: MEDICAID

## 2023-08-03 DIAGNOSIS — R10.11 RIGHT UPPER QUADRANT PAIN: Primary | ICD-10-CM

## 2023-08-03 PROCEDURE — 1160F RVW MEDS BY RX/DR IN RCRD: CPT | Mod: CPTII,95,, | Performed by: SURGERY

## 2023-08-03 PROCEDURE — 1160F PR REVIEW ALL MEDS BY PRESCRIBER/CLIN PHARMACIST DOCUMENTED: ICD-10-PCS | Mod: CPTII,95,, | Performed by: SURGERY

## 2023-08-03 PROCEDURE — 1159F PR MEDICATION LIST DOCUMENTED IN MEDICAL RECORD: ICD-10-PCS | Mod: CPTII,95,, | Performed by: SURGERY

## 2023-08-03 PROCEDURE — 99213 PR OFFICE/OUTPT VISIT, EST, LEVL III, 20-29 MIN: ICD-10-PCS | Mod: 95,,, | Performed by: SURGERY

## 2023-08-03 PROCEDURE — 1159F MED LIST DOCD IN RCRD: CPT | Mod: CPTII,95,, | Performed by: SURGERY

## 2023-08-03 PROCEDURE — 99213 OFFICE O/P EST LOW 20 MIN: CPT | Mod: 95,,, | Performed by: SURGERY
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
## 2023-08-08 ENCOUNTER — TELEPHONE (OUTPATIENT)
Dept: SURGERY | Facility: CLINIC | Age: 33
End: 2023-08-08
Payer: MEDICAID

## 2023-08-08 DIAGNOSIS — Z01.89 NORMAL ESOPHAGOGASTRODUODENOSCOPY (EGD): Primary | ICD-10-CM

## 2023-08-08 RX ORDER — SODIUM CHLORIDE 0.9 % (FLUSH) 0.9 %
3 SYRINGE (ML) INJECTION
Status: CANCELLED | OUTPATIENT
Start: 2023-08-08

## 2023-08-08 NOTE — TELEPHONE ENCOUNTER
EGD Prep Instructions    [facility]  [address]  [address]    You are scheduled for an EGD with Dr. moreno on 09/07/2023 at [facility], located [address] [facility]  Check in at the admit desk, first floor of the hospital (which is the building on the left).   You will receive a call 2-3 days before your EGD to tell you the time to arrive.  If you have not received a call by the day before your procedure, call the Endoscopy Lab at [phone]    Nothing to eat or drink after midnight before the procedure.  You MAY brush your teeth.    You MAY take your blood pressure, heart, and seizure medication on the morning of the procedure, with a SIP of water.  Hold ALL other medications until after the procedure.    If you are on blood thinners THAT YOU HAVE BEEN INSTRUCTED TO HOLD BY YOUR DOCTOR FOR THIS PROCEDURE, thppen do NOT take this the morning of your EGD.  Do NOT stop these medications on your own, they must be approved to be held by your doctor.  Your EGD can NOT be done if you are on these medications.  Examples of blood thinners include: Coumadin, Aggrenox, Plavix, Pradaxa, Reapro, Pletal, Xarelto, Ticagrelor, Brilinta, Eliquis, and high dose aspirin (325 mg).  You do not have to stop baby aspirin 81 mg.    Please make sure that you have a  home because you will receive an IV sedation. You may NOT take an uber, lyft, taxi, or bus home unless you have a responsible adult with you.     You will receive a call 2-3 days before your EGD to tell you the time to arrive.  If you have not received a call by the day before your procedure, call the Endoscopy department at [phone].

## 2023-08-25 DIAGNOSIS — R51.9 NONINTRACTABLE HEADACHE, UNSPECIFIED CHRONICITY PATTERN, UNSPECIFIED HEADACHE TYPE: ICD-10-CM

## 2023-08-25 NOTE — TELEPHONE ENCOUNTER
No care due was identified.  Health [facility] Embedded Care Due Messages. Reference number: [id number].   8/25/2023 12:27:05 AM CDT

## 2023-08-26 RX ORDER — BUTALBITAL, ACETAMINOPHEN AND CAFFEINE 50; 325; 40 MG/1; MG/1; MG/1
TABLET ORAL
Qty: 30 TABLET | Refills: 0 | Status: SHIPPED | OUTPATIENT
Start: 2023-08-26 | End: 2024-01-09 | Stop reason: SDUPTHER

## 2023-08-30 ENCOUNTER — OFFICE VISIT (OUTPATIENT)
Dept: OBSTETRICS AND GYNECOLOGY | Facility: CLINIC | Age: 33
End: 2023-08-30
Payer: MEDICAID

## 2023-08-30 ENCOUNTER — HOSPITAL ENCOUNTER (OUTPATIENT)
Dept: RADIOLOGY | Facility: HOSPITAL | Age: 33
Discharge: HOME | End: 2023-08-30
Attending: STUDENT IN AN ORGANIZED HEALTH CARE EDUCATION/TRAINING PROGRAM
Payer: MEDICAID

## 2023-08-30 VITALS
WEIGHT: 204.13 LBS | DIASTOLIC BLOOD PRESSURE: 88 MMHG | SYSTOLIC BLOOD PRESSURE: 122 MMHG | BODY MASS INDEX: 35.04 KG/M2

## 2023-08-30 DIAGNOSIS — N64.4 BREAST PAIN: ICD-10-CM

## 2023-08-30 DIAGNOSIS — Z01.419 WELL WOMAN EXAM: Primary | ICD-10-CM

## 2023-08-30 DIAGNOSIS — R10.2 PELVIC PAIN: ICD-10-CM

## 2023-08-30 PROCEDURE — 3079F PR MOST RECENT DIASTOLIC BLOOD PRESSURE 80-89 MM HG: ICD-10-PCS | Mod: CPTII,,, | Performed by: STUDENT IN AN ORGANIZED HEALTH CARE EDUCATION/TRAINING PROGRAM

## 2023-08-30 PROCEDURE — 99214 PR OFFICE/OUTPT VISIT, EST, LEVL IV, 30-39 MIN: ICD-10-PCS | Mod: S$PBB,25,, | Performed by: STUDENT IN AN ORGANIZED HEALTH CARE EDUCATION/TRAINING PROGRAM

## 2023-08-30 PROCEDURE — 1159F PR MEDICATION LIST DOCUMENTED IN MEDICAL RECORD: ICD-10-PCS | Mod: CPTII,,, | Performed by: STUDENT IN AN ORGANIZED HEALTH CARE EDUCATION/TRAINING PROGRAM

## 2023-08-30 PROCEDURE — 3008F PR BODY MASS INDEX (BMI) DOCUMENTED: ICD-10-PCS | Mod: CPTII,,, | Performed by: STUDENT IN AN ORGANIZED HEALTH CARE EDUCATION/TRAINING PROGRAM

## 2023-08-30 PROCEDURE — 3074F PR MOST RECENT SYSTOLIC BLOOD PRESSURE < 130 MM HG: ICD-10-PCS | Mod: CPTII,,, | Performed by: STUDENT IN AN ORGANIZED HEALTH CARE EDUCATION/TRAINING PROGRAM

## 2023-08-30 PROCEDURE — 3079F DIAST BP 80-89 MM HG: CPT | Mod: CPTII,,, | Performed by: STUDENT IN AN ORGANIZED HEALTH CARE EDUCATION/TRAINING PROGRAM

## 2023-08-30 PROCEDURE — 76830 US PELVIS COMP WITH TRANSVAG NON-OB (XPD): ICD-10-PCS | Mod: 26,,, | Performed by: RADIOLOGY

## 2023-08-30 PROCEDURE — 99214 OFFICE O/P EST MOD 30 MIN: CPT | Mod: S$PBB,25,, | Performed by: STUDENT IN AN ORGANIZED HEALTH CARE EDUCATION/TRAINING PROGRAM

## 2023-08-30 PROCEDURE — 99214 OFFICE O/P EST MOD 30 MIN: CPT | Mod: PBBFAC,PN,25 | Performed by: STUDENT IN AN ORGANIZED HEALTH CARE EDUCATION/TRAINING PROGRAM

## 2023-08-30 PROCEDURE — 3074F SYST BP LT 130 MM HG: CPT | Mod: CPTII,,, | Performed by: STUDENT IN AN ORGANIZED HEALTH CARE EDUCATION/TRAINING PROGRAM

## 2023-08-30 PROCEDURE — 99395 PREV VISIT EST AGE 18-39: CPT | Mod: 25,S$PBB,, | Performed by: STUDENT IN AN ORGANIZED HEALTH CARE EDUCATION/TRAINING PROGRAM

## 2023-08-30 PROCEDURE — 99999 PR PBB SHADOW E&M-EST. PATIENT-LVL IV: CPT | Mod: PBBFAC,,, | Performed by: STUDENT IN AN ORGANIZED HEALTH CARE EDUCATION/TRAINING PROGRAM

## 2023-08-30 PROCEDURE — 76830 TRANSVAGINAL US NON-OB: CPT | Mod: 26,,, | Performed by: RADIOLOGY

## 2023-08-30 PROCEDURE — 99395 PR PREVENTIVE VISIT,EST,18-39: ICD-10-PCS | Mod: 25,S$PBB,, | Performed by: STUDENT IN AN ORGANIZED HEALTH CARE EDUCATION/TRAINING PROGRAM

## 2023-08-30 PROCEDURE — 99999 PR PBB SHADOW E&M-EST. PATIENT-LVL IV: ICD-10-PCS | Mod: PBBFAC,,, | Performed by: STUDENT IN AN ORGANIZED HEALTH CARE EDUCATION/TRAINING PROGRAM

## 2023-08-30 PROCEDURE — 76856 US PELVIS COMP WITH TRANSVAG NON-OB (XPD): ICD-10-PCS | Mod: 26,,, | Performed by: RADIOLOGY

## 2023-08-30 PROCEDURE — 76856 US EXAM PELVIC COMPLETE: CPT | Mod: 26,,, | Performed by: RADIOLOGY

## 2023-08-30 PROCEDURE — 76856 US EXAM PELVIC COMPLETE: CPT | Mod: TC,PN

## 2023-08-30 PROCEDURE — 3008F BODY MASS INDEX DOCD: CPT | Mod: CPTII,,, | Performed by: STUDENT IN AN ORGANIZED HEALTH CARE EDUCATION/TRAINING PROGRAM

## 2023-08-30 PROCEDURE — 1159F MED LIST DOCD IN RCRD: CPT | Mod: CPTII,,, | Performed by: STUDENT IN AN ORGANIZED HEALTH CARE EDUCATION/TRAINING PROGRAM

## 2023-08-30 RX ORDER — ESTRADIOL 0.1 MG/D
1 FILM, EXTENDED RELEASE TRANSDERMAL
Qty: 8 PATCH | Refills: 11 | Status: SHIPPED | OUTPATIENT
Start: 2023-08-31 | End: 2024-08-30
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
## 2023-08-31 ENCOUNTER — HOSPITAL ENCOUNTER (OUTPATIENT)
Dept: RADIOLOGY | Facility: HOSPITAL | Age: 33
Discharge: HOME | End: 2023-08-31
Attending: STUDENT IN AN ORGANIZED HEALTH CARE EDUCATION/TRAINING PROGRAM
Payer: MEDICAID

## 2023-08-31 DIAGNOSIS — N64.4 BREAST PAIN: ICD-10-CM

## 2023-08-31 PROCEDURE — 76642 ULTRASOUND BREAST LIMITED: CPT | Mod: 26,RT,, | Performed by: RADIOLOGY

## 2023-08-31 PROCEDURE — 77062 MAMMO DIGITAL DIAGNOSTIC BILAT WITH TOMO: ICD-10-PCS | Mod: 26,,, | Performed by: RADIOLOGY

## 2023-08-31 PROCEDURE — 77066 MAMMO DIGITAL DIAGNOSTIC BILAT WITH TOMO: ICD-10-PCS | Mod: 26,,, | Performed by: RADIOLOGY

## 2023-08-31 PROCEDURE — 77062 BREAST TOMOSYNTHESIS BI: CPT | Mod: TC,PO

## 2023-08-31 PROCEDURE — 76642 ULTRASOUND BREAST LIMITED: CPT | Mod: TC,PO,RT

## 2023-08-31 PROCEDURE — 77062 BREAST TOMOSYNTHESIS BI: CPT | Mod: 26,,, | Performed by: RADIOLOGY

## 2023-08-31 PROCEDURE — 77066 DX MAMMO INCL CAD BI: CPT | Mod: 26,,, | Performed by: RADIOLOGY

## 2023-08-31 PROCEDURE — 76642 US BREAST RIGHT LIMITED: ICD-10-PCS | Mod: 26,RT,, | Performed by: RADIOLOGY

## 2023-09-13 ENCOUNTER — PATIENT MESSAGE (OUTPATIENT)
Dept: SURGERY | Facility: CLINIC | Age: 33
End: 2023-09-13
Payer: MEDICAID

## 2023-09-13 ENCOUNTER — OFFICE VISIT (OUTPATIENT)
Dept: CARDIOLOGY | Facility: CLINIC | Age: 33
End: 2023-09-13
Payer: MEDICAID

## 2023-09-13 VITALS
WEIGHT: 206.13 LBS | SYSTOLIC BLOOD PRESSURE: 131 MMHG | DIASTOLIC BLOOD PRESSURE: 82 MMHG | OXYGEN SATURATION: 100 % | BODY MASS INDEX: 35.38 KG/M2 | HEART RATE: 101 BPM

## 2023-09-13 DIAGNOSIS — R07.9 CHEST PAIN OF UNCERTAIN ETIOLOGY: Primary | ICD-10-CM

## 2023-09-13 DIAGNOSIS — R00.0 TACHYCARDIA, UNSPECIFIED: ICD-10-CM

## 2023-09-13 DIAGNOSIS — R00.2 PALPITATIONS: ICD-10-CM

## 2023-09-13 DIAGNOSIS — R94.31 NONSPECIFIC ABNORMAL ELECTROCARDIOGRAM (ECG) (EKG): ICD-10-CM

## 2023-09-13 DIAGNOSIS — R07.89 CHEST WALL PAIN: ICD-10-CM

## 2023-09-13 PROCEDURE — 99999 PR PBB SHADOW E&M-EST. PATIENT-LVL IV: CPT | Mod: PBBFAC,,, | Performed by: INTERNAL MEDICINE

## 2023-09-13 PROCEDURE — 99999 PR PBB SHADOW E&M-EST. PATIENT-LVL IV: ICD-10-PCS | Mod: PBBFAC,,, | Performed by: INTERNAL MEDICINE

## 2023-09-13 PROCEDURE — 3079F PR MOST RECENT DIASTOLIC BLOOD PRESSURE 80-89 MM HG: ICD-10-PCS | Mod: CPTII,,, | Performed by: INTERNAL MEDICINE

## 2023-09-13 PROCEDURE — 3075F SYST BP GE 130 - 139MM HG: CPT | Mod: CPTII,,, | Performed by: INTERNAL MEDICINE

## 2023-09-13 PROCEDURE — 93010 EKG 12-LEAD: ICD-10-PCS | Mod: S$PBB,,, | Performed by: INTERNAL MEDICINE

## 2023-09-13 PROCEDURE — 3075F PR MOST RECENT SYSTOLIC BLOOD PRESS GE 130-139MM HG: ICD-10-PCS | Mod: CPTII,,, | Performed by: INTERNAL MEDICINE

## 2023-09-13 PROCEDURE — 1159F MED LIST DOCD IN RCRD: CPT | Mod: CPTII,,, | Performed by: INTERNAL MEDICINE

## 2023-09-13 PROCEDURE — 3008F BODY MASS INDEX DOCD: CPT | Mod: CPTII,,, | Performed by: INTERNAL MEDICINE

## 2023-09-13 PROCEDURE — 99214 OFFICE O/P EST MOD 30 MIN: CPT | Mod: PBBFAC,PN | Performed by: INTERNAL MEDICINE

## 2023-09-13 PROCEDURE — 99205 PR OFFICE/OUTPT VISIT, NEW, LEVL V, 60-74 MIN: ICD-10-PCS | Mod: S$PBB,,, | Performed by: INTERNAL MEDICINE

## 2023-09-13 PROCEDURE — 99205 OFFICE O/P NEW HI 60 MIN: CPT | Mod: S$PBB,,, | Performed by: INTERNAL MEDICINE

## 2023-09-13 PROCEDURE — 93010 ELECTROCARDIOGRAM REPORT: CPT | Mod: S$PBB,,, | Performed by: INTERNAL MEDICINE

## 2023-09-13 PROCEDURE — 3008F PR BODY MASS INDEX (BMI) DOCUMENTED: ICD-10-PCS | Mod: CPTII,,, | Performed by: INTERNAL MEDICINE

## 2023-09-13 PROCEDURE — 1159F PR MEDICATION LIST DOCUMENTED IN MEDICAL RECORD: ICD-10-PCS | Mod: CPTII,,, | Performed by: INTERNAL MEDICINE

## 2023-09-13 PROCEDURE — 3079F DIAST BP 80-89 MM HG: CPT | Mod: CPTII,,, | Performed by: INTERNAL MEDICINE

## 2023-09-13 PROCEDURE — 93005 ELECTROCARDIOGRAM TRACING: CPT | Mod: PBBFAC,PN | Performed by: INTERNAL MEDICINE

## 2023-09-13 RX ORDER — DEXTROAMPHETAMINE SACCHARATE, AMPHETAMINE ASPARTATE, DEXTROAMPHETAMINE SULFATE AND AMPHETAMINE SULFATE 7.5; 7.5; 7.5; 7.5 MG/1; MG/1; MG/1; MG/1
1 TABLET ORAL 2 TIMES DAILY
COMMUNITY
Start: 2023-08-24

## 2023-09-13 NOTE — PROGRESS NOTES
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 very helpful for her symptoms of ADD so I think it is OK for her to continue to take it as she wishes to do.    The chest pain is chest wall pain.  Tylenol prn is recommended    Nonspecific low T waves on today's ECG are not clinically significant    Pt reassured that her heart is structurally normal and that there is no evidence of heart disease and that it is safe to continue taking the Adderall    F/u cong Morocho    No follow-ups on file.

## 2023-09-15 ENCOUNTER — OFFICE VISIT (OUTPATIENT)
Dept: SURGERY | Facility: CLINIC | Age: 33
End: 2023-09-15
Payer: MEDICAID

## 2023-09-15 VITALS
WEIGHT: 205.38 LBS | DIASTOLIC BLOOD PRESSURE: 84 MMHG | HEART RATE: 101 BPM | SYSTOLIC BLOOD PRESSURE: 128 MMHG | BODY MASS INDEX: 35.25 KG/M2

## 2023-09-15 DIAGNOSIS — K80.50 BILIARY COLIC SYMPTOM: Primary | ICD-10-CM

## 2023-09-15 DIAGNOSIS — K80.50 BILIARY COLIC: ICD-10-CM

## 2023-09-15 PROCEDURE — 99213 PR OFFICE/OUTPT VISIT, EST, LEVL III, 20-29 MIN: ICD-10-PCS | Mod: S$PBB,,, | Performed by: SURGERY

## 2023-09-15 PROCEDURE — 1160F RVW MEDS BY RX/DR IN RCRD: CPT | Mod: CPTII,,, | Performed by: SURGERY

## 2023-09-15 PROCEDURE — 3079F DIAST BP 80-89 MM HG: CPT | Mod: CPTII,,, | Performed by: SURGERY

## 2023-09-15 PROCEDURE — 3008F BODY MASS INDEX DOCD: CPT | Mod: CPTII,,, | Performed by: SURGERY

## 2023-09-15 PROCEDURE — 99215 OFFICE O/P EST HI 40 MIN: CPT | Mod: PBBFAC,PN | Performed by: SURGERY

## 2023-09-15 PROCEDURE — 1160F PR REVIEW ALL MEDS BY PRESCRIBER/CLIN PHARMACIST DOCUMENTED: ICD-10-PCS | Mod: CPTII,,, | Performed by: SURGERY

## 2023-09-15 PROCEDURE — 3074F SYST BP LT 130 MM HG: CPT | Mod: CPTII,,, | Performed by: SURGERY

## 2023-09-15 PROCEDURE — 3008F PR BODY MASS INDEX (BMI) DOCUMENTED: ICD-10-PCS | Mod: CPTII,,, | Performed by: SURGERY

## 2023-09-15 PROCEDURE — 1159F PR MEDICATION LIST DOCUMENTED IN MEDICAL RECORD: ICD-10-PCS | Mod: CPTII,,, | Performed by: SURGERY

## 2023-09-15 PROCEDURE — 1159F MED LIST DOCD IN RCRD: CPT | Mod: CPTII,,, | Performed by: SURGERY

## 2023-09-15 PROCEDURE — 3074F PR MOST RECENT SYSTOLIC BLOOD PRESSURE < 130 MM HG: ICD-10-PCS | Mod: CPTII,,, | Performed by: SURGERY

## 2023-09-15 PROCEDURE — 99213 OFFICE O/P EST LOW 20 MIN: CPT | Mod: S$PBB,,, | Performed by: SURGERY

## 2023-09-15 PROCEDURE — 3079F PR MOST RECENT DIASTOLIC BLOOD PRESSURE 80-89 MM HG: ICD-10-PCS | Mod: CPTII,,, | Performed by: SURGERY

## 2023-09-15 PROCEDURE — 99999 PR PBB SHADOW E&M-EST. PATIENT-LVL V: ICD-10-PCS | Mod: PBBFAC,,, | Performed by: SURGERY

## 2023-09-15 PROCEDURE — 99999 PR PBB SHADOW E&M-EST. PATIENT-LVL V: CPT | Mod: PBBFAC,,, | Performed by: SURGERY

## 2023-09-15 RX ORDER — ENOXAPARIN SODIUM 100 MG/ML
40 INJECTION SUBCUTANEOUS
Status: CANCELLED | OUTPATIENT
Start: 2023-10-04

## 2023-09-15 RX ORDER — CLINDAMYCIN PHOSPHATE 900 MG/50ML
900 INJECTION, SOLUTION INTRAVENOUS
Status: CANCELLED | OUTPATIENT
Start: 2023-09-15

## 2023-09-15 RX ORDER — SODIUM CHLORIDE 9 MG/ML
INJECTION, SOLUTION INTRAVENOUS CONTINUOUS
Status: CANCELLED | OUTPATIENT
Start: 2023-09-15

## 2023-09-18 ENCOUNTER — PATIENT MESSAGE (OUTPATIENT)
Dept: PRIMARY CARE CLINIC | Facility: CLINIC | Age: 33
End: 2023-09-18
Payer: MEDICAID
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
## 2023-09-23 ENCOUNTER — NURSE TRIAGE (OUTPATIENT)
Dept: ADMINISTRATIVE | Facility: CLINIC | Age: 33
End: 2023-09-23
Payer: MEDICAID

## 2023-09-23 ENCOUNTER — HOSPITAL ENCOUNTER (EMERGENCY)
Facility: HOSPITAL | Age: 33
Discharge: HOME | End: 2023-09-23
Attending: EMERGENCY MEDICINE
Payer: MEDICAID

## 2023-09-23 VITALS
HEART RATE: 92 BPM | TEMPERATURE: 99 F | DIASTOLIC BLOOD PRESSURE: 78 MMHG | RESPIRATION RATE: 18 BRPM | BODY MASS INDEX: 35 KG/M2 | HEIGHT: 64 IN | SYSTOLIC BLOOD PRESSURE: 120 MMHG | OXYGEN SATURATION: 97 % | WEIGHT: 205 LBS

## 2023-09-23 DIAGNOSIS — R10.11 RUQ ABDOMINAL PAIN: Primary | ICD-10-CM

## 2023-09-23 LAB
ALBUMIN SERPL BCP-MCNC: 3.8 G/DL (ref 3.5–5.2)
ALP SERPL-CCNC: 69 U/L (ref 55–135)
ALT SERPL W/O P-5'-P-CCNC: 8 U/L (ref 10–44)
ANION GAP SERPL CALC-SCNC: 11 MMOL/L (ref 8–16)
AST SERPL-CCNC: 9 U/L (ref 10–40)
BASOPHILS # BLD AUTO: 0.03 K/UL (ref 0–0.2)
BASOPHILS NFR BLD: 0.4 % (ref 0–1.9)
BILIRUB SERPL-MCNC: 0.6 MG/DL (ref 0.1–1)
BILIRUB UR QL STRIP: NEGATIVE
BUN SERPL-MCNC: 8 MG/DL (ref 6–20)
CALCIUM SERPL-MCNC: 8.8 MG/DL (ref 8.7–10.5)
CHLORIDE SERPL-SCNC: 105 MMOL/L (ref 95–110)
CLARITY UR: ABNORMAL
CO2 SERPL-SCNC: 21 MMOL/L (ref 23–29)
COLOR UR: ABNORMAL
CREAT SERPL-MCNC: 0.7 MG/DL (ref 0.5–1.4)
DIFFERENTIAL METHOD: ABNORMAL
EOSINOPHIL # BLD AUTO: 0.1 K/UL (ref 0–0.5)
EOSINOPHIL NFR BLD: 1.6 % (ref 0–8)
ERYTHROCYTE [DISTWIDTH] IN BLOOD BY AUTOMATED COUNT: 12.9 % (ref 11.5–14.5)
EST. GFR  (NO RACE VARIABLE): >60 ML/MIN/1.73 M^2
GLUCOSE SERPL-MCNC: 93 MG/DL (ref 70–110)
GLUCOSE UR QL STRIP: NEGATIVE
HCT VFR BLD AUTO: 33.5 % (ref 37–48.5)
HGB BLD-MCNC: 11.6 G/DL (ref 12–16)
HGB UR QL STRIP: NEGATIVE
IMM GRANULOCYTES # BLD AUTO: 0.02 K/UL (ref 0–0.04)
IMM GRANULOCYTES NFR BLD AUTO: 0.3 % (ref 0–0.5)
KETONES UR QL STRIP: NEGATIVE
LEUKOCYTE ESTERASE UR QL STRIP: NEGATIVE
LIPASE SERPL-CCNC: 23 U/L (ref 4–60)
LYMPHOCYTES # BLD AUTO: 1.4 K/UL (ref 1–4.8)
LYMPHOCYTES NFR BLD: 18 % (ref 18–48)
MCH RBC QN AUTO: 31 PG (ref 27–31)
MCHC RBC AUTO-ENTMCNC: 34.6 G/DL (ref 32–36)
MCV RBC AUTO: 90 FL (ref 82–98)
MONOCYTES # BLD AUTO: 0.7 K/UL (ref 0.3–1)
MONOCYTES NFR BLD: 9.6 % (ref 4–15)
NEUTROPHILS # BLD AUTO: 5.4 K/UL (ref 1.8–7.7)
NEUTROPHILS NFR BLD: 70.1 % (ref 38–73)
NITRITE UR QL STRIP: NEGATIVE
NRBC BLD-RTO: 0 /100 WBC
PH UR STRIP: 5 [PH] (ref 5–8)
PLATELET # BLD AUTO: 249 K/UL (ref 150–450)
PMV BLD AUTO: 9.1 FL (ref 9.2–12.9)
POTASSIUM SERPL-SCNC: 4 MMOL/L (ref 3.5–5.1)
PROT SERPL-MCNC: 6.9 G/DL (ref 6–8.4)
PROT UR QL STRIP: NEGATIVE
RBC # BLD AUTO: 3.74 M/UL (ref 4–5.4)
SODIUM SERPL-SCNC: 137 MMOL/L (ref 136–145)
SP GR UR STRIP: 1.02 (ref 1–1.03)
URN SPEC COLLECT METH UR: ABNORMAL
UROBILINOGEN UR STRIP-ACNC: NEGATIVE EU/DL
WBC # BLD AUTO: 7.71 K/UL (ref 3.9–12.7)

## 2023-09-23 PROCEDURE — 36415 COLL VENOUS BLD VENIPUNCTURE: CPT | Performed by: PHYSICIAN ASSISTANT

## 2023-09-23 PROCEDURE — 99284 EMERGENCY DEPT VISIT MOD MDM: CPT | Mod: 25

## 2023-09-23 PROCEDURE — 25000003 PHARM REV CODE 250: Performed by: EMERGENCY MEDICINE

## 2023-09-23 PROCEDURE — 80053 COMPREHEN METABOLIC PANEL: CPT | Performed by: PHYSICIAN ASSISTANT

## 2023-09-23 PROCEDURE — 83690 ASSAY OF LIPASE: CPT | Performed by: PHYSICIAN ASSISTANT

## 2023-09-23 PROCEDURE — 81003 URINALYSIS AUTO W/O SCOPE: CPT | Performed by: PHYSICIAN ASSISTANT

## 2023-09-23 PROCEDURE — 85025 COMPLETE CBC W/AUTO DIFF WBC: CPT | Performed by: PHYSICIAN ASSISTANT

## 2023-09-23 RX ORDER — TRAMADOL HYDROCHLORIDE 50 MG/1
50 TABLET ORAL
Status: COMPLETED | OUTPATIENT
Start: 2023-09-23 | End: 2023-09-23

## 2023-09-23 RX ORDER — DICYCLOMINE HYDROCHLORIDE 20 MG/1
20 TABLET ORAL 3 TIMES DAILY PRN
Qty: 20 TABLET | Refills: 0 | Status: SHIPPED | OUTPATIENT
Start: 2023-09-23 | End: 2023-10-23

## 2023-09-23 RX ADMIN — TRAMADOL HYDROCHLORIDE 50 MG: 50 TABLET, COATED ORAL at 04:09
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
## 2023-09-23 NOTE — TELEPHONE ENCOUNTER
Pt c/o constant abdominal pain 8/10. Pt states that she is scheduled for gallbladder sx on 10/4/23. States that abdominal pain usually comes and goes after eating. Now c/o constant abdominal pain 8/10 that is worsening. Advised to go to ED now per protocol. Verbalized understanding. Encounter routed to provider.       Reason for Disposition   [1] SEVERE pain (e.g., excruciating) AND [2] present > 1 hour    Additional Information   Negative: Shock suspected (e.g., cold/pale/clammy skin, too weak to stand, low BP, rapid pulse)   Negative: Difficult to awaken or acting confused (e.g., disoriented, slurred speech)   Negative: Passed out (i.e., lost consciousness, collapsed and was not responding)   Negative: Sounds like a life-threatening emergency to the triager    Protocols used: Abdominal Pain - Female-A-

## 2023-09-23 NOTE — FIRST PROVIDER EVALUATION
Emergency Department TeleTriage Encounter Note      CHIEF COMPLAINT    Chief Complaint   Patient presents with    Abdominal Pain     Started Wednesday / gallbladder issues        VITAL SIGNS   Initial Vitals [09/23/23 1316]   BP Pulse Resp Temp SpO2   129/78 97 18 98.5 °F (36.9 °C) 99 %      MAP       --            ALLERGIES    Review of patient's allergies indicates:   Allergen Reactions    Amoxicillin Hives    Azithromycin Rash     Yeast infection       PROVIDER TRIAGE NOTE  This is a teletriage evaluation of a 32 y.o. female presenting to the ED complaining of abdominal pain. Patient reports RUQ abdominal pain for 3 days. She is scheduled to have cholecystectomy in the next 2 weeks. She reports nausea. Pain is gradually worsening.    Patient is alert and oriented. She speaks in complete sentences. She is sitting upright in the chair in no distress.     Initial orders will be placed and care will be transferred to an alternate provider when patient is roomed for a full evaluation. Any additional orders and the final disposition will be determined by that provider.         ORDERS  Labs Reviewed   CBC W/ AUTO DIFFERENTIAL   COMPREHENSIVE METABOLIC PANEL   LIPASE   URINALYSIS, REFLEX TO URINE CULTURE       ED Orders (720h ago, onward)      Start Ordered     Status Ordering Provider    09/23/23 1321 09/23/23 1320  Vital signs  Every 2 hours         Ordered NILDA, JACINTO G.    09/23/23 1321 09/23/23 1320  Diet NPO  Diet effective now         Ordered NILDA, JACINTO G.    09/23/23 1321 09/23/23 1320  Insert peripheral IV  Once         Ordered NILDA, JACINTO G.    09/23/23 1321 09/23/23 1320  CBC W/ AUTO DIFFERENTIAL  STAT         Pending Collection NILDA, JACINTO G.    09/23/23 1321 09/23/23 1320  Comp. Metabolic Panel  STAT         Pending Collection NILDA, JACINTO G.    09/23/23 1321 09/23/23 1320  Lipase  STAT         Pending Collection NILDA, JACINTO G.    09/23/23 1321 09/23/23 1320  Urinalysis, Reflex to Urine Culture Urine,  Clean Catch  STAT         Ordered JACINTO MUNGUIA              Virtual Visit Note: The provider triage portion of this emergency department evaluation and documentation was performed via [employer], a HIPAA-compliant telemedicine application, in concert with a tele-presenter in the room. A face to face patient evaluation with one of my colleagues will occur once the patient is placed in an emergency department room.      DISCLAIMER: This note was prepared with [employer] voice recognition transcription software. Garbled syntax, mangled pronouns, and other bizarre constructions may be attributed to that software system.

## 2023-09-26 ENCOUNTER — OFFICE VISIT (OUTPATIENT)
Dept: SURGERY | Facility: CLINIC | Age: 33
End: 2023-09-26
Payer: MEDICAID

## 2023-09-26 DIAGNOSIS — K80.50 BILIARY COLIC: Primary | ICD-10-CM

## 2023-09-26 PROCEDURE — 99213 PR OFFICE/OUTPT VISIT, EST, LEVL III, 20-29 MIN: ICD-10-PCS | Mod: 95,,, | Performed by: SURGERY

## 2023-09-26 PROCEDURE — 99213 OFFICE O/P EST LOW 20 MIN: CPT | Mod: 95,,, | Performed by: SURGERY

## 2023-09-26 PROCEDURE — 1159F MED LIST DOCD IN RCRD: CPT | Mod: CPTII,95,, | Performed by: SURGERY

## 2023-09-26 PROCEDURE — 1160F PR REVIEW ALL MEDS BY PRESCRIBER/CLIN PHARMACIST DOCUMENTED: ICD-10-PCS | Mod: CPTII,95,, | Performed by: SURGERY

## 2023-09-26 PROCEDURE — 1159F PR MEDICATION LIST DOCUMENTED IN MEDICAL RECORD: ICD-10-PCS | Mod: CPTII,95,, | Performed by: SURGERY

## 2023-09-26 PROCEDURE — 1160F RVW MEDS BY RX/DR IN RCRD: CPT | Mod: CPTII,95,, | Performed by: SURGERY
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
## 2023-10-13 ENCOUNTER — PATIENT MESSAGE (OUTPATIENT)
Dept: SURGERY | Facility: CLINIC | Age: 33
End: 2023-10-13
Payer: MEDICAID

## 2023-10-17 ENCOUNTER — OFFICE VISIT (OUTPATIENT)
Dept: SURGERY | Facility: CLINIC | Age: 33
End: 2023-10-17
Payer: MEDICAID

## 2023-10-17 DIAGNOSIS — Z98.890 POST-OPERATIVE STATE: Primary | ICD-10-CM

## 2023-10-17 PROCEDURE — 1159F PR MEDICATION LIST DOCUMENTED IN MEDICAL RECORD: ICD-10-PCS | Mod: CPTII,95,, | Performed by: SURGERY

## 2023-10-17 PROCEDURE — 99024 PR POST-OP FOLLOW-UP VISIT: ICD-10-PCS | Mod: 95,,, | Performed by: SURGERY

## 2023-10-17 PROCEDURE — 1159F MED LIST DOCD IN RCRD: CPT | Mod: CPTII,95,, | Performed by: SURGERY

## 2023-10-17 PROCEDURE — 1160F PR REVIEW ALL MEDS BY PRESCRIBER/CLIN PHARMACIST DOCUMENTED: ICD-10-PCS | Mod: CPTII,95,, | Performed by: SURGERY

## 2023-10-17 PROCEDURE — 1160F RVW MEDS BY RX/DR IN RCRD: CPT | Mod: CPTII,95,, | Performed by: SURGERY

## 2023-10-17 PROCEDURE — 99024 POSTOP FOLLOW-UP VISIT: CPT | Mod: 95,,, | Performed by: SURGERY

## 2023-10-18 ENCOUNTER — PATIENT MESSAGE (OUTPATIENT)
Dept: CARDIOLOGY | Facility: CLINIC | Age: 33
End: 2023-10-18
Payer: MEDICAID
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
## 2023-10-25 DIAGNOSIS — R39.89 BLADDER PAIN: ICD-10-CM

## 2023-10-25 RX ORDER — METHENAMINE, SODIUM PHOSPHATE, MONOBASIC, MONOHYDRATE, PHENYL SALICYLATE, METHYLENE BLUE, AND HYOSCYAMINE SULFATE 118; 40.8; 36; 10; .12 MG/1; MG/1; MG/1; MG/1; MG/1
1 CAPSULE ORAL 4 TIMES DAILY PRN
Qty: 30 CAPSULE | Refills: 4 | Status: SHIPPED | OUTPATIENT
Start: 2023-10-25 | End: 2023-12-26 | Stop reason: SDUPTHER

## 2023-10-29 ENCOUNTER — PATIENT MESSAGE (OUTPATIENT)
Dept: PRIMARY CARE CLINIC | Facility: CLINIC | Age: 33
End: 2023-10-29
Payer: MEDICAID

## 2023-12-07 ENCOUNTER — PATIENT MESSAGE (OUTPATIENT)
Dept: UROLOGY | Facility: CLINIC | Age: 33
End: 2023-12-07
Payer: MEDICAID

## 2023-12-21 NOTE — PROGRESS NOTES
"Subjective:      Rosa Crowley is a 33 y.o. female who returns today regarding medication refill.    History of interstitial cystitis currently takes Uribel and amitriptyline 10 mg nightly with complete control of symptoms.  She presents today for refills.  She denies bothersome LUTS.  Most recent urine culture 12/7 revealed no growth      The following portions of the patient's history were reviewed and updated as appropriate: allergies, current medications, past family history, past medical history, past social history, past surgical history and problem list.    Review of Systems  A comprehensive multipoint review of systems was negative except as otherwise stated in the HPI.     Objective:   Vitals: /87 (BP Location: Left arm, Patient Position: Sitting, BP Method: Medium (Manual))   Pulse 101   Ht 5' 4" (1.626 m)   Wt 87.3 kg (192 lb 7.4 oz)   LMP 02/10/2022   BMI 33.04 kg/m²       Physical Exam   General: alert and oriented, no acute distress  Respiratory: Symmetric expansion, non-labored breathing  Cardiovascular: regular rate and rhythm, nomal pulses, no peripheral edema  Abdomen: soft, non tender  Skin: normal coloration and turgor, no rashes, no suspicious skin lesions noted  Psych: normal judgment and insight, normal mood/affect, and non-anxious    Lab Review   Urinalysis demonstrates no ua   Lab Results   Component Value Date    WBC 5.66 09/27/2023    HGB 11.9 (L) 09/27/2023    HCT 34.5 (L) 09/27/2023    MCV 92 09/27/2023     09/27/2023     Lab Results   Component Value Date    CREATININE 0.7 09/27/2023    BUN 8 09/27/2023       Imaging   Results for orders placed during the hospital encounter of 02/28/23    CT Renal Stone Study ABD Pelvis WO    Narrative  EXAMINATION:  CT RENAL STONE STUDY ABD PELVIS WO    CLINICAL HISTORY:  Flank pain, kidney stone suspected;    TECHNIQUE:  Low dose axial images, sagittal and coronal reformations were obtained from the lung bases to the " pubic symphysis.  Contrast was not administered.    COMPARISON:  CT abdomen and pelvis with contrast dated 03/31/2020.    FINDINGS:  The lung bases are clear.  No pleural effusions are identified.  Bony structures appear intact.  There is no evidence for acute fracture or bone destruction.    There is an elongated right lobe of the liver which can be a normal finding and this is unchanged.  There is a subtle subcentimeter hypodensity within the caudal right lobe of the liver which may represent a small cyst and this was also noted on the prior CT examination dated 01/31/2013 with little interval change.  No new liver lesions are identified.  The gallbladder is present and appears grossly unremarkable.  No biliary dilatation is appreciated on this noncontrast examination.  The spleen is mildly enlarged as before and homogeneous in density with no focal splenic lesions identified.  The stomach and pancreas both appear grossly unremarkable.  The adrenal glands are not enlarged.  The kidneys are normal in size, position, and contour.  There is no evidence for abnormal renal masses or hydronephrosis.  There is no evidence for renal or ureteral calculi.  The abdominal aorta tapers normally without aneurysmal dilatation.  No para-aortic lymphadenopathy is identified.  The appendix is present and appears grossly unremarkable.  No dilated loops of bowel are evident.  The urinary bladder appears grossly unremarkable.  The uterus is absent.  No abnormal adnexal masses are identified.  No ascites is identified.  There is no evidence for pelvic or inguinal lymphadenopathy.  There is a small fat containing umbilical hernia.    Impression  No renal or ureteral calculi identified.    Subcentimeter hepatic hypodensity, likely a small cyst and stable dating back to 01/31/2013.    Mild splenomegaly.    S/p hysterectomy.    Tiny fat containing umbilical hernia.      Electronically signed by: Juan Carlos Barker  MD  Date:    02/28/2023  Time:    09:07      Assessment and Plan:   1. Bladder pain  --continue Uribel and amitriptyline nightly.  Okay to take Uribel up to 4 times a day p.r.n..  --new order for standing urine culture placed    --return to clinic in 1 year; sooner for new or worsening symptoms      - amitriptyline (ELAVIL) 10 MG tablet; Take 1 tablet (10 mg total) by mouth nightly as needed for Pain.  Dispense: 30 tablet; Refill: 11  - URIBEL 118-10-40.8-36 mg Cap; Take 1 capsule by mouth 4 (four) times daily as needed (bladder pain).  Dispense: 30 capsule; Refill: 11  - Urine culture; Standing         This note is dictated on M*Modal word recognition program.  There are word recognition mistakes that are occasionally missed on review.

## 2023-12-26 ENCOUNTER — OFFICE VISIT (OUTPATIENT)
Dept: UROLOGY | Facility: CLINIC | Age: 33
End: 2023-12-26
Payer: MEDICAID

## 2023-12-26 VITALS
HEIGHT: 64 IN | DIASTOLIC BLOOD PRESSURE: 87 MMHG | BODY MASS INDEX: 32.85 KG/M2 | HEART RATE: 101 BPM | SYSTOLIC BLOOD PRESSURE: 125 MMHG | WEIGHT: 192.44 LBS

## 2023-12-26 DIAGNOSIS — R39.89 BLADDER PAIN: Primary | ICD-10-CM

## 2023-12-26 PROCEDURE — 99213 OFFICE O/P EST LOW 20 MIN: CPT | Mod: S$PBB,,, | Performed by: NURSE PRACTITIONER

## 2023-12-26 PROCEDURE — 1160F RVW MEDS BY RX/DR IN RCRD: CPT | Mod: CPTII,,, | Performed by: NURSE PRACTITIONER

## 2023-12-26 PROCEDURE — 3008F BODY MASS INDEX DOCD: CPT | Mod: CPTII,,, | Performed by: NURSE PRACTITIONER

## 2023-12-26 PROCEDURE — 3079F PR MOST RECENT DIASTOLIC BLOOD PRESSURE 80-89 MM HG: ICD-10-PCS | Mod: CPTII,,, | Performed by: NURSE PRACTITIONER

## 2023-12-26 PROCEDURE — 3074F PR MOST RECENT SYSTOLIC BLOOD PRESSURE < 130 MM HG: ICD-10-PCS | Mod: CPTII,,, | Performed by: NURSE PRACTITIONER

## 2023-12-26 PROCEDURE — 99214 OFFICE O/P EST MOD 30 MIN: CPT | Mod: PBBFAC,PN | Performed by: NURSE PRACTITIONER

## 2023-12-26 PROCEDURE — 1159F MED LIST DOCD IN RCRD: CPT | Mod: CPTII,,, | Performed by: NURSE PRACTITIONER

## 2023-12-26 PROCEDURE — 3079F DIAST BP 80-89 MM HG: CPT | Mod: CPTII,,, | Performed by: NURSE PRACTITIONER

## 2023-12-26 PROCEDURE — 99999 PR PBB SHADOW E&M-EST. PATIENT-LVL IV: ICD-10-PCS | Mod: PBBFAC,,, | Performed by: NURSE PRACTITIONER

## 2023-12-26 PROCEDURE — 1160F PR REVIEW ALL MEDS BY PRESCRIBER/CLIN PHARMACIST DOCUMENTED: ICD-10-PCS | Mod: CPTII,,, | Performed by: NURSE PRACTITIONER

## 2023-12-26 PROCEDURE — 3008F PR BODY MASS INDEX (BMI) DOCUMENTED: ICD-10-PCS | Mod: CPTII,,, | Performed by: NURSE PRACTITIONER

## 2023-12-26 PROCEDURE — 99999 PR PBB SHADOW E&M-EST. PATIENT-LVL IV: CPT | Mod: PBBFAC,,, | Performed by: NURSE PRACTITIONER

## 2023-12-26 PROCEDURE — 99213 PR OFFICE/OUTPT VISIT, EST, LEVL III, 20-29 MIN: ICD-10-PCS | Mod: S$PBB,,, | Performed by: NURSE PRACTITIONER

## 2023-12-26 PROCEDURE — 1159F PR MEDICATION LIST DOCUMENTED IN MEDICAL RECORD: ICD-10-PCS | Mod: CPTII,,, | Performed by: NURSE PRACTITIONER

## 2023-12-26 PROCEDURE — 3074F SYST BP LT 130 MM HG: CPT | Mod: CPTII,,, | Performed by: NURSE PRACTITIONER

## 2023-12-26 RX ORDER — MELOXICAM 15 MG/1
15 TABLET ORAL
COMMUNITY
Start: 2023-12-14 | End: 2024-01-09 | Stop reason: SDUPTHER

## 2023-12-26 RX ORDER — OXYBUTYNIN CHLORIDE 5 MG/1
5 TABLET ORAL 3 TIMES DAILY PRN
Qty: 30 TABLET | Refills: 5 | Status: SHIPPED | OUTPATIENT
Start: 2023-12-26 | End: 2024-12-25

## 2023-12-26 RX ORDER — AMITRIPTYLINE HYDROCHLORIDE 10 MG/1
10 TABLET, FILM COATED ORAL NIGHTLY PRN
Qty: 30 TABLET | Refills: 11 | Status: SHIPPED | OUTPATIENT
Start: 2023-12-26 | End: 2024-02-23

## 2023-12-26 RX ORDER — METHENAMINE, SODIUM PHOSPHATE, MONOBASIC, MONOHYDRATE, PHENYL SALICYLATE, METHYLENE BLUE, AND HYOSCYAMINE SULFATE 118; 40.8; 36; 10; .12 MG/1; MG/1; MG/1; MG/1; MG/1
1 CAPSULE ORAL 4 TIMES DAILY PRN
Qty: 30 CAPSULE | Refills: 11 | Status: SHIPPED | OUTPATIENT
Start: 2023-12-26

## 2024-01-04 ENCOUNTER — PATIENT MESSAGE (OUTPATIENT)
Dept: PRIMARY CARE CLINIC | Facility: CLINIC | Age: 34
End: 2024-01-04
Payer: MEDICAID

## 2024-01-09 ENCOUNTER — OFFICE VISIT (OUTPATIENT)
Dept: PRIMARY CARE CLINIC | Facility: CLINIC | Age: 34
End: 2024-01-09
Payer: MEDICAID

## 2024-01-09 VITALS
OXYGEN SATURATION: 99 % | HEIGHT: 64 IN | SYSTOLIC BLOOD PRESSURE: 106 MMHG | WEIGHT: 192.13 LBS | HEART RATE: 115 BPM | DIASTOLIC BLOOD PRESSURE: 84 MMHG | BODY MASS INDEX: 32.8 KG/M2 | RESPIRATION RATE: 18 BRPM

## 2024-01-09 DIAGNOSIS — M79.641 PAIN IN BOTH HANDS: ICD-10-CM

## 2024-01-09 DIAGNOSIS — F41.0 PANIC ATTACK: ICD-10-CM

## 2024-01-09 DIAGNOSIS — M25.60 STIFF JOINT: ICD-10-CM

## 2024-01-09 DIAGNOSIS — M06.9 RHEUMATOID ARTHRITIS, INVOLVING UNSPECIFIED SITE, UNSPECIFIED WHETHER RHEUMATOID FACTOR PRESENT: ICD-10-CM

## 2024-01-09 DIAGNOSIS — M79.642 PAIN IN BOTH HANDS: ICD-10-CM

## 2024-01-09 DIAGNOSIS — F43.10 PTSD (POST-TRAUMATIC STRESS DISORDER): ICD-10-CM

## 2024-01-09 DIAGNOSIS — M77.8 TENDONITIS OF FINGER: Primary | ICD-10-CM

## 2024-01-09 DIAGNOSIS — R45.851 DEPRESSION WITH SUICIDAL IDEATION: ICD-10-CM

## 2024-01-09 DIAGNOSIS — R51.9 NONINTRACTABLE HEADACHE, UNSPECIFIED CHRONICITY PATTERN, UNSPECIFIED HEADACHE TYPE: ICD-10-CM

## 2024-01-09 DIAGNOSIS — Z13.1 DIABETES MELLITUS SCREENING: ICD-10-CM

## 2024-01-09 DIAGNOSIS — F98.8 ATTENTION DEFICIT DISORDER, UNSPECIFIED HYPERACTIVITY PRESENCE: ICD-10-CM

## 2024-01-09 DIAGNOSIS — F32.A DEPRESSION WITH SUICIDAL IDEATION: ICD-10-CM

## 2024-01-09 PROCEDURE — 99999 PR PBB SHADOW E&M-EST. PATIENT-LVL V: CPT | Mod: PBBFAC,,, | Performed by: INTERNAL MEDICINE

## 2024-01-09 PROCEDURE — 99215 OFFICE O/P EST HI 40 MIN: CPT | Mod: PBBFAC,PN | Performed by: INTERNAL MEDICINE

## 2024-01-09 PROCEDURE — 3074F SYST BP LT 130 MM HG: CPT | Mod: CPTII,,, | Performed by: INTERNAL MEDICINE

## 2024-01-09 PROCEDURE — 1159F MED LIST DOCD IN RCRD: CPT | Mod: CPTII,,, | Performed by: INTERNAL MEDICINE

## 2024-01-09 PROCEDURE — 3079F DIAST BP 80-89 MM HG: CPT | Mod: CPTII,,, | Performed by: INTERNAL MEDICINE

## 2024-01-09 PROCEDURE — 1160F RVW MEDS BY RX/DR IN RCRD: CPT | Mod: CPTII,,, | Performed by: INTERNAL MEDICINE

## 2024-01-09 PROCEDURE — 3008F BODY MASS INDEX DOCD: CPT | Mod: CPTII,,, | Performed by: INTERNAL MEDICINE

## 2024-01-09 PROCEDURE — 99214 OFFICE O/P EST MOD 30 MIN: CPT | Mod: S$PBB,,, | Performed by: INTERNAL MEDICINE

## 2024-01-09 RX ORDER — BUTALBITAL, ACETAMINOPHEN AND CAFFEINE 50; 325; 40 MG/1; MG/1; MG/1
TABLET ORAL
Qty: 30 TABLET | Refills: 0 | Status: SHIPPED | OUTPATIENT
Start: 2024-01-09

## 2024-01-09 RX ORDER — PREDNISONE 20 MG/1
20 TABLET ORAL 2 TIMES DAILY
Qty: 10 TABLET | Refills: 0 | Status: SHIPPED | OUTPATIENT
Start: 2024-01-09 | End: 2024-01-14

## 2024-01-09 RX ORDER — IBUPROFEN 800 MG/1
800 TABLET ORAL 3 TIMES DAILY
Qty: 45 TABLET | Refills: 1 | Status: SHIPPED | OUTPATIENT
Start: 2024-01-09

## 2024-01-10 ENCOUNTER — PATIENT MESSAGE (OUTPATIENT)
Dept: PRIMARY CARE CLINIC | Facility: CLINIC | Age: 34
End: 2024-01-10
Payer: MEDICAID

## 2024-01-10 NOTE — PROGRESS NOTES
Subjective:       Patient ID: Rosa Crowley is a 33 y.o. female.    Chief Complaint: Hand Pain    Hand Pain   Pertinent negatives include no chest pain.     Patient with history of anxiety depression posttraumatic stress syndrome rheumatoid arthritis patient states that both her hand has been hurting and swelling at the dorsum of both hands from rheumatoid arthritis she also believe injure her thumb when she try popping her left thumb now with pain at the base of the left thumb volar aspect tender with pressure and palpation in this area no swelling no redness she went to urgent care had x-ray taken tone normal.  Patient also request refill on headache medication.  She currently not pregnant she was referred to Rheumatology but not able to get an appointment yet she deny pregnancy she had total hysterectomy  Review of Systems   Constitutional:  Negative for unexpected weight change.   Respiratory:  Negative for shortness of breath.    Cardiovascular:  Negative for chest pain.   Gastrointestinal:  Negative for abdominal pain.   Musculoskeletal:  Positive for arthralgias.   Psychiatric/Behavioral:  Positive for dysphoric mood.        Objective:      Physical Exam  Vitals and nursing note reviewed.   Constitutional:       General: She is not in acute distress.     Appearance: She is well-developed.   HENT:      Head: Normocephalic and atraumatic.      Right Ear: External ear normal.      Left Ear: External ear normal.      Nose: Nose normal.      Mouth/Throat:      Pharynx: No oropharyngeal exudate.   Eyes:      Extraocular Movements: Extraocular movements intact.      Conjunctiva/sclera: Conjunctivae normal.      Pupils: Pupils are equal, round, and reactive to light.   Neck:      Thyroid: No thyromegaly.   Cardiovascular:      Rate and Rhythm: Normal rate and regular rhythm.      Heart sounds: Normal heart sounds. No murmur heard.     No friction rub. No gallop.   Pulmonary:      Effort: Pulmonary  effort is normal. No respiratory distress.      Breath sounds: Normal breath sounds. No wheezing.   Abdominal:      General: Bowel sounds are normal. There is no distension.      Palpations: Abdomen is soft.      Tenderness: There is no abdominal tenderness.   Musculoskeletal:         General: Tenderness (Subjective tenderness at the dorsum of both hand and tenderness with palpation at the base of the left thumb volar aspect no obvious swelling or erythema hurt with range of motion of the left thumb) present. No deformity. Normal range of motion.      Cervical back: Normal range of motion and neck supple.   Lymphadenopathy:      Cervical: No cervical adenopathy.   Skin:     General: Skin is warm and dry.      Findings: No erythema or rash.   Neurological:      Mental Status: She is alert and oriented to person, place, and time.   Psychiatric:         Mood and Affect: Mood normal.         Thought Content: Thought content normal.         Judgment: Judgment normal.         Assessment:       1. Tendonitis of finger    2. Nonintractable headache, unspecified chronicity pattern, unspecified headache type    3. Depression with suicidal ideation    4. Attention deficit disorder, unspecified hyperactivity presence    5. Panic attack    6. Rheumatoid arthritis, involving unspecified site, unspecified whether rheumatoid factor present    7. Pain in both hands    8. Stiff joint    9. Diabetes mellitus screening    10. PTSD (post-traumatic stress disorder)        Plan:       Tendonitis of finger  -     predniSONE (DELTASONE) 20 MG tablet; Take 1 tablet (20 mg total) by mouth 2 (two) times daily. Don't take with another NSAIDS for 5 days  Dispense: 10 tablet; Refill: 0  -     Ambulatory referral/consult to Rheumatology; Future; Expected date: 01/16/2024    Nonintractable headache, unspecified chronicity pattern, unspecified headache type  -     butalbital-acetaminophen-caffeine -40 mg (FIORICET, ESGIC) -40 mg per  tablet; TAKE 1 TABLET BY MOUTH TWICE DAILY AS NEEDED FOR HEADACHE  Dispense: 30 tablet; Refill: 0  -     ibuprofen (ADVIL,MOTRIN) 800 MG tablet; Take 1 tablet (800 mg total) by mouth 3 (three) times daily.  Dispense: 45 tablet; Refill: 1  -     CBC Auto Differential; Future; Expected date: 01/09/2024  -     Comprehensive Metabolic Panel; Future; Expected date: 01/09/2024    Depression with suicidal ideation  Comments:  currently stable on mediactions and psych f/u therapy  Orders:  -     TSH; Future; Expected date: 01/09/2024  -     T4, Free; Future; Expected date: 01/09/2024    Attention deficit disorder, unspecified hyperactivity presence  Comments:  cotinue with adderall    Panic attack  Comments:  continue with tx as per psych    Rheumatoid arthritis, involving unspecified site, unspecified whether rheumatoid factor present  -     X-Ray Chest PA And Lateral; Future; Expected date: 01/09/2024  -     Urinalysis; Future; Expected date: 01/09/2024  -     Sedimentation rate; Future; Expected date: 01/09/2024  -     C-REACTIVE PROTEIN; Future; Expected date: 01/09/2024  -     Uric Acid; Future; Expected date: 01/09/2024  -     Rheumatoid Factor; Future; Expected date: 01/09/2024  -     predniSONE (DELTASONE) 20 MG tablet; Take 1 tablet (20 mg total) by mouth 2 (two) times daily. Don't take with another NSAIDS for 5 days  Dispense: 10 tablet; Refill: 0  -     Ambulatory referral/consult to Rheumatology; Future; Expected date: 01/16/2024    Pain in both hands  -     Uric Acid; Future; Expected date: 01/09/2024  -     Ambulatory referral/consult to Rheumatology; Future; Expected date: 01/16/2024    Stiff joint    Diabetes mellitus screening  -     Hemoglobin A1C; Future; Expected date: 01/09/2024  -     Lipid Panel; Future; Expected date: 01/09/2024    PTSD (post-traumatic stress disorder)  Comments:  stable on mediactions and psych f/u        Medication List with Changes/Refills   New Medications    PREDNISONE  (DELTASONE) 20 MG TABLET    Take 1 tablet (20 mg total) by mouth 2 (two) times daily. Don't take with another NSAIDS for 5 days   Current Medications    ALBUTEROL (PROVENTIL/VENTOLIN HFA) 90 MCG/ACTUATION INHALER    Inhale 2 puffs into the lungs every 4 (four) hours as needed for Shortness of Breath or Wheezing.    AMITRIPTYLINE (ELAVIL) 10 MG TABLET    Take 1 tablet (10 mg total) by mouth nightly as needed for Pain.    BUPROPION (WELLBUTRIN XL) 300 MG 24 HR TABLET    Take 450 mg by mouth every evening.    BUSPIRONE (BUSPAR) 15 MG TABLET    Take 1 tablet (15 mg total) by mouth 3 (three) times daily.    CLONAZEPAM (KLONOPIN) 1 MG TABLET    Take 1 mg by mouth 2 (two) times daily as needed for Anxiety.    DEXTROAMPHETAMINE-AMPHETAMINE (ADDERALL) 20 MG TABLET    Take by mouth.    DEXTROAMPHETAMINE-AMPHETAMINE 30 MG TAB    Take 1 tablet by mouth 2 (two) times daily.    DICLOFENAC SODIUM (VOLTAREN) 1 % GEL    Apply to painful finger QID    ESTRADIOL (VIVELLE-DOT) 0.1 MG/24 HR PTSW    Place 1 patch onto the skin twice a week.    ONDANSETRON (ZOFRAN-ODT) 8 MG TBDL    Take 1 tablet (8 mg total) by mouth 2 (two) times daily.    OXYBUTYNIN (DITROPAN) 5 MG TAB    Take 1 tablet (5 mg total) by mouth 3 (three) times daily as needed (Bladder spasm/pain).    URIBEL 118-10-40.8-36 MG CAP    Take 1 capsule by mouth 4 (four) times daily as needed (bladder pain).    VRAYLAR 3 MG CAP    Take 3 mg by mouth.   Changed and/or Refilled Medications    Modified Medication Previous Medication    BUTALBITAL-ACETAMINOPHEN-CAFFEINE -40 MG (FIORICET, ESGIC) -40 MG PER TABLET butalbital-acetaminophen-caffeine -40 mg (FIORICET, ESGIC) -40 mg per tablet       TAKE 1 TABLET BY MOUTH TWICE DAILY AS NEEDED FOR HEADACHE    TAKE 1 TABLET BY MOUTH TWICE DAILY AS NEEDED FOR HEADACHE    IBUPROFEN (ADVIL,MOTRIN) 800 MG TABLET ibuprofen (ADVIL,MOTRIN) 800 MG tablet       Take 1 tablet (800 mg total) by mouth 3 (three) times daily.     Take 1 tablet (800 mg total) by mouth 3 (three) times daily.   Discontinued Medications    MELOXICAM (MOBIC) 15 MG TABLET    Take 15 mg by mouth.

## 2024-01-11 ENCOUNTER — TELEPHONE (OUTPATIENT)
Dept: PRIMARY CARE CLINIC | Facility: CLINIC | Age: 34
End: 2024-01-11
Payer: MEDICAID

## 2024-01-11 DIAGNOSIS — E79.0 HYPERURICEMIA: Primary | ICD-10-CM

## 2024-01-11 RX ORDER — ALLOPURINOL 100 MG/1
100 TABLET ORAL DAILY
Qty: 90 TABLET | Refills: 3 | Status: SHIPPED | OUTPATIENT
Start: 2024-01-11

## 2024-01-12 ENCOUNTER — PATIENT MESSAGE (OUTPATIENT)
Dept: RHEUMATOLOGY | Facility: CLINIC | Age: 34
End: 2024-01-12
Payer: MEDICAID

## 2024-01-12 ENCOUNTER — PATIENT MESSAGE (OUTPATIENT)
Dept: PRIMARY CARE CLINIC | Facility: CLINIC | Age: 34
End: 2024-01-12
Payer: MEDICAID

## 2024-01-23 ENCOUNTER — PATIENT MESSAGE (OUTPATIENT)
Dept: PRIMARY CARE CLINIC | Facility: CLINIC | Age: 34
End: 2024-01-23
Payer: MEDICAID

## 2024-01-24 NOTE — TELEPHONE ENCOUNTER
Called patient and she said that she is on a wait list to see a rheumatologist.  It is over a year wait.

## 2024-01-24 NOTE — TELEPHONE ENCOUNTER
It better wait for rheumatologist since the medication will be life long make sure she is on the rt medication

## 2024-02-03 ENCOUNTER — HOSPITAL ENCOUNTER (EMERGENCY)
Facility: HOSPITAL | Age: 34
Discharge: HOME | End: 2024-02-03
Attending: EMERGENCY MEDICINE
Payer: MEDICAID

## 2024-02-03 VITALS
OXYGEN SATURATION: 100 % | WEIGHT: 190.69 LBS | RESPIRATION RATE: 20 BRPM | SYSTOLIC BLOOD PRESSURE: 181 MMHG | DIASTOLIC BLOOD PRESSURE: 94 MMHG | TEMPERATURE: 98 F | HEIGHT: 64 IN | BODY MASS INDEX: 32.56 KG/M2 | HEART RATE: 95 BPM

## 2024-02-03 DIAGNOSIS — K08.89 PAIN, DENTAL: Primary | ICD-10-CM

## 2024-02-03 PROCEDURE — 99284 EMERGENCY DEPT VISIT MOD MDM: CPT

## 2024-02-03 RX ORDER — KETOROLAC TROMETHAMINE 10 MG/1
10 TABLET, FILM COATED ORAL EVERY 8 HOURS PRN
Qty: 12 TABLET | Refills: 0 | Status: SHIPPED | OUTPATIENT
Start: 2024-02-03 | End: 2024-02-03

## 2024-02-03 RX ORDER — KETOROLAC TROMETHAMINE 10 MG/1
10 TABLET, FILM COATED ORAL EVERY 8 HOURS PRN
Qty: 12 TABLET | Refills: 0 | Status: SHIPPED | OUTPATIENT
Start: 2024-02-03 | End: 2024-05-26

## 2024-02-03 RX ORDER — CLINDAMYCIN HYDROCHLORIDE 150 MG/1
300 CAPSULE ORAL EVERY 8 HOURS
Qty: 42 CAPSULE | Refills: 0 | Status: SHIPPED | OUTPATIENT
Start: 2024-02-03 | End: 2024-02-10

## 2024-02-03 RX ORDER — CLINDAMYCIN HYDROCHLORIDE 150 MG/1
300 CAPSULE ORAL EVERY 8 HOURS
Qty: 42 CAPSULE | Refills: 0 | Status: SHIPPED | OUTPATIENT
Start: 2024-02-03 | End: 2024-02-03
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
## 2024-02-04 NOTE — DISCHARGE INSTRUCTIONS
[employer] is a school of dentistry located in the [place] city of [place]. Wikipedia  Located in: [facility]  Address: [address] [place] [id number]  Founded: 1970  Phone: [phone]  Hours: Closed ? Opens 8?AM Mon

## 2024-02-04 NOTE — ED TRIAGE NOTES
C/o dental pain/discomfort x1 week after breaking a tooth. Mild facial swelling noted. Denies fever. Airway patent. Resp even unlabored.

## 2024-02-06 ENCOUNTER — PATIENT MESSAGE (OUTPATIENT)
Dept: PRIMARY CARE CLINIC | Facility: CLINIC | Age: 34
End: 2024-02-06
Payer: MEDICAID

## 2024-02-06 DIAGNOSIS — L50.9 URTICARIA: Primary | ICD-10-CM

## 2024-02-07 ENCOUNTER — OFFICE VISIT (OUTPATIENT)
Dept: PRIMARY CARE CLINIC | Facility: CLINIC | Age: 34
End: 2024-02-07
Payer: MEDICAID

## 2024-02-07 DIAGNOSIS — F41.9 ANXIETY: ICD-10-CM

## 2024-02-07 DIAGNOSIS — L50.9 URTICARIA: Primary | ICD-10-CM

## 2024-02-07 DIAGNOSIS — M06.9 RHEUMATOID ARTHRITIS, INVOLVING UNSPECIFIED SITE, UNSPECIFIED WHETHER RHEUMATOID FACTOR PRESENT: ICD-10-CM

## 2024-02-07 DIAGNOSIS — F43.10 PTSD (POST-TRAUMATIC STRESS DISORDER): ICD-10-CM

## 2024-02-07 PROCEDURE — 3044F HG A1C LEVEL LT 7.0%: CPT | Mod: CPTII,95,, | Performed by: INTERNAL MEDICINE

## 2024-02-07 PROCEDURE — 1160F RVW MEDS BY RX/DR IN RCRD: CPT | Mod: CPTII,95,, | Performed by: INTERNAL MEDICINE

## 2024-02-07 PROCEDURE — 1159F MED LIST DOCD IN RCRD: CPT | Mod: CPTII,95,, | Performed by: INTERNAL MEDICINE

## 2024-02-07 PROCEDURE — 99213 OFFICE O/P EST LOW 20 MIN: CPT | Mod: 95,,, | Performed by: INTERNAL MEDICINE

## 2024-02-07 RX ORDER — MONTELUKAST SODIUM 10 MG/1
10 TABLET ORAL NIGHTLY
Qty: 30 TABLET | Refills: 2 | Status: SHIPPED | OUTPATIENT
Start: 2024-02-07 | End: 2024-05-26

## 2024-02-07 RX ORDER — BETAMETHASONE DIPROPIONATE 0.5 MG/G
CREAM TOPICAL 2 TIMES DAILY
Qty: 45 G | Refills: 1 | Status: SHIPPED | OUTPATIENT
Start: 2024-02-07 | End: 2024-05-26

## 2024-02-07 RX ORDER — HYDROXYZINE HYDROCHLORIDE 25 MG/1
25 TABLET, FILM COATED ORAL 4 TIMES DAILY PRN
Qty: 45 TABLET | Refills: 2 | Status: SHIPPED | OUTPATIENT
Start: 2024-02-07

## 2024-02-08 NOTE — PROGRESS NOTES
Subjective:    The patient location is: home  The chief complaint leading to consultation is: hives  RA    Visit type: audiovisual    Face to Face time with patient: 15   minutes of total time spent on the encounter, which includes face to face time and non-face to face time preparing to see the patient (eg, review of tests), Obtaining and/or reviewing separately obtained history, Documenting clinical information in the electronic or other health record, Independently interpreting results (not separately reported) and communicating results to the patient/family/caregiver, or Care coordination (not separately reported).         Each patient to whom he or she provides medical services by telemedicine is:  (1) informed of the relationship between the physician and patient and the respective role of any other health care provider with respect to management of the patient; and (2) notified that he or she may decline to receive medical services by telemedicine and may withdraw from such care at any time.    Notes:     Patient ID: Rosa Crowley is a 33 y.o. female.    Chief Complaint: No chief complaint on file.    HPI  Pt visit today with h/o mental illness bipolar ds and RF she c/o having skin rash like hives in upper and lower ext and face itching very bad she took zyrtec OTC not helping she refude to take any steroid no sob cp no LYNNE no fever chill night sweat. She ha sappt with rheu matology end of this month she denies sob cp LYNNE not pregnant   Review of Systems   Constitutional:  Negative for unexpected weight change.   Respiratory:  Negative for shortness of breath.    Cardiovascular:  Negative for chest pain.   Gastrointestinal:  Negative for abdominal pain.   Musculoskeletal:  Positive for arthralgias and back pain.   Skin:  Positive for rash.   Psychiatric/Behavioral:  Positive for dysphoric mood.        Objective:      Physical Exam  Constitutional:       General: She is not in acute distress.      Appearance: Normal appearance.   HENT:      Head: Normocephalic and atraumatic.   Eyes:      Extraocular Movements: Extraocular movements intact.   Pulmonary:      Effort: Pulmonary effort is normal.   Abdominal:      General: There is no distension.      Tenderness: There is no abdominal tenderness.   Skin:     Comments: Patchy erythematous pruritic skin rash face and upper and lower ext distally   Neurological:      Mental Status: She is alert and oriented to person, place, and time.   Psychiatric:         Mood and Affect: Mood normal.         Thought Content: Thought content normal.         Judgment: Judgment normal.         Assessment:       1. Urticaria    2. Rheumatoid arthritis, involving unspecified site, unspecified whether rheumatoid factor present    3. PTSD (post-traumatic stress disorder)    4. Anxiety        Plan:       Urticaria  -     betamethasone dipropionate 0.05 % cream; Apply topically 2 (two) times daily.  Dispense: 45 g; Refill: 1  -     montelukast (SINGULAIR) 10 mg tablet; Take 1 tablet (10 mg total) by mouth every evening.  Dispense: 30 tablet; Refill: 2  -     hydrOXYzine HCL (ATARAX) 25 MG tablet; Take 1 tablet (25 mg total) by mouth 4 (four) times daily as needed for Itching.  Dispense: 45 tablet; Refill: 2    Rheumatoid arthritis, involving unspecified site, unspecified whether rheumatoid factor present  Comments:  await rheumatology consult for further w/u and tx    PTSD (post-traumatic stress disorder)  Comments:  continue with tx and f/u with psych    Anxiety  Comments:  continue with tx pt is stable on current meds        Medication List with Changes/Refills   New Medications    BETAMETHASONE DIPROPIONATE 0.05 % CREAM    Apply topically 2 (two) times daily.    HYDROXYZINE HCL (ATARAX) 25 MG TABLET    Take 1 tablet (25 mg total) by mouth 4 (four) times daily as needed for Itching.    MONTELUKAST (SINGULAIR) 10 MG TABLET    Take 1 tablet (10 mg total) by mouth every evening.    Current Medications    ALBUTEROL (PROVENTIL/VENTOLIN HFA) 90 MCG/ACTUATION INHALER    Inhale 2 puffs into the lungs every 4 (four) hours as needed for Shortness of Breath or Wheezing.    ALLOPURINOL (ZYLOPRIM) 100 MG TABLET    Take 1 tablet (100 mg total) by mouth once daily.    AMITRIPTYLINE (ELAVIL) 10 MG TABLET    Take 1 tablet (10 mg total) by mouth nightly as needed for Pain.    BUPROPION (WELLBUTRIN XL) 300 MG 24 HR TABLET    Take 450 mg by mouth every evening.    BUSPIRONE (BUSPAR) 15 MG TABLET    Take 1 tablet (15 mg total) by mouth 3 (three) times daily.    BUTALBITAL-ACETAMINOPHEN-CAFFEINE -40 MG (FIORICET, ESGIC) -40 MG PER TABLET    TAKE 1 TABLET BY MOUTH TWICE DAILY AS NEEDED FOR HEADACHE    CLINDAMYCIN (CLEOCIN) 150 MG CAPSULE    Take 2 capsules (300 mg total) by mouth every 8 (eight) hours. for 7 days    CLONAZEPAM (KLONOPIN) 1 MG TABLET    Take 1 mg by mouth 2 (two) times daily as needed for Anxiety.    DEXTROAMPHETAMINE-AMPHETAMINE (ADDERALL) 20 MG TABLET    Take by mouth.    DEXTROAMPHETAMINE-AMPHETAMINE 30 MG TAB    Take 1 tablet by mouth 2 (two) times daily.    DICLOFENAC SODIUM (VOLTAREN) 1 % GEL    Apply to painful finger QID    ESTRADIOL (VIVELLE-DOT) 0.1 MG/24 HR PTSW    Place 1 patch onto the skin twice a week.    IBUPROFEN (ADVIL,MOTRIN) 800 MG TABLET    Take 1 tablet (800 mg total) by mouth 3 (three) times daily.    KETOROLAC (TORADOL) 10 MG TABLET    Take 1 tablet (10 mg total) by mouth every 8 (eight) hours as needed for Pain.    ONDANSETRON (ZOFRAN-ODT) 8 MG TBDL    Take 1 tablet (8 mg total) by mouth 2 (two) times daily.    OXYBUTYNIN (DITROPAN) 5 MG TAB    Take 1 tablet (5 mg total) by mouth 3 (three) times daily as needed (Bladder spasm/pain).    URIBEL 118-10-40.8-36 MG CAP    Take 1 capsule by mouth 4 (four) times daily as needed (bladder pain).    VRAYLAR 3 MG CAP    Take 3 mg by mouth.

## 2024-02-13 ENCOUNTER — ON-DEMAND VIRTUAL (OUTPATIENT)
Dept: URGENT CARE | Facility: CLINIC | Age: 34
End: 2024-02-13
Payer: MEDICAID

## 2024-02-13 DIAGNOSIS — R21 RASH AND NONSPECIFIC SKIN ERUPTION: Primary | ICD-10-CM

## 2024-02-13 PROCEDURE — 99213 OFFICE O/P EST LOW 20 MIN: CPT | Mod: 95,S$GLB,, | Performed by: FAMILY MEDICINE

## 2024-02-13 RX ORDER — TRIAMCINOLONE ACETONIDE 5 MG/G
CREAM TOPICAL 2 TIMES DAILY
Qty: 45 G | Refills: 3 | Status: SHIPPED | OUTPATIENT
Start: 2024-02-13 | End: 2024-05-26

## 2024-02-13 RX ORDER — TRIAMCINOLONE ACETONIDE 1 MG/G
CREAM TOPICAL 2 TIMES DAILY
Qty: 30 G | Refills: 0 | Status: SHIPPED | OUTPATIENT
Start: 2024-02-13 | End: 2024-05-26
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
## 2024-02-13 NOTE — LETTER
February 13, 2024      Urgent Care [address] [address]  Phone: [phone]  Fax: [phone]       Patient: Rosa Crowley   YOB: 1990  Date of Visit: 02/13/2024    To Whom It May Concern:    Grant Crowley  was at [facility] on 02/13/2024. The patient may return to work/school on 2/14/24 with no restrictions. If you have any questions or concerns, or if I can be of further assistance, please do not hesitate to contact me.    Sincerely,    Parish Tomlin MD

## 2024-02-14 ENCOUNTER — HOSPITAL ENCOUNTER (EMERGENCY)
Facility: HOSPITAL | Age: 34
Discharge: HOME | End: 2024-02-14
Attending: STUDENT IN AN ORGANIZED HEALTH CARE EDUCATION/TRAINING PROGRAM
Payer: MEDICAID

## 2024-02-14 ENCOUNTER — OFFICE VISIT (OUTPATIENT)
Dept: PRIMARY CARE CLINIC | Facility: CLINIC | Age: 34
End: 2024-02-14
Payer: MEDICAID

## 2024-02-14 ENCOUNTER — TELEPHONE (OUTPATIENT)
Dept: PRIMARY CARE CLINIC | Facility: CLINIC | Age: 34
End: 2024-02-14
Payer: MEDICAID

## 2024-02-14 VITALS
BODY MASS INDEX: 32.44 KG/M2 | RESPIRATION RATE: 17 BRPM | WEIGHT: 189 LBS | TEMPERATURE: 99 F | DIASTOLIC BLOOD PRESSURE: 87 MMHG | SYSTOLIC BLOOD PRESSURE: 120 MMHG | HEART RATE: 95 BPM | OXYGEN SATURATION: 100 %

## 2024-02-14 VITALS
OXYGEN SATURATION: 97 % | BODY MASS INDEX: 32.85 KG/M2 | WEIGHT: 192.44 LBS | HEIGHT: 64 IN | HEART RATE: 114 BPM | SYSTOLIC BLOOD PRESSURE: 148 MMHG | DIASTOLIC BLOOD PRESSURE: 100 MMHG | RESPIRATION RATE: 18 BRPM | TEMPERATURE: 99 F

## 2024-02-14 DIAGNOSIS — R53.1 GENERALIZED WEAKNESS: ICD-10-CM

## 2024-02-14 DIAGNOSIS — R21 RASH: Primary | ICD-10-CM

## 2024-02-14 DIAGNOSIS — M06.9 RHEUMATOID ARTHRITIS, INVOLVING UNSPECIFIED SITE, UNSPECIFIED WHETHER RHEUMATOID FACTOR PRESENT: ICD-10-CM

## 2024-02-14 DIAGNOSIS — B09 VIRAL EXANTHEM: Primary | ICD-10-CM

## 2024-02-14 DIAGNOSIS — R03.0 ELEVATED BLOOD PRESSURE READING: ICD-10-CM

## 2024-02-14 DIAGNOSIS — R00.0 TACHYCARDIA: ICD-10-CM

## 2024-02-14 DIAGNOSIS — F31.9 BIPOLAR 1 DISORDER: ICD-10-CM

## 2024-02-14 DIAGNOSIS — F43.10 PTSD (POST-TRAUMATIC STRESS DISORDER): ICD-10-CM

## 2024-02-14 LAB
ALBUMIN SERPL BCP-MCNC: 4.2 G/DL (ref 3.5–5.2)
ALP SERPL-CCNC: 85 U/L (ref 55–135)
ALT SERPL W/O P-5'-P-CCNC: 10 U/L (ref 10–44)
ANION GAP SERPL CALC-SCNC: 9 MMOL/L (ref 8–16)
AST SERPL-CCNC: 23 U/L (ref 10–40)
BASOPHILS # BLD AUTO: 0.03 K/UL (ref 0–0.2)
BASOPHILS NFR BLD: 0.5 % (ref 0–1.9)
BILIRUB SERPL-MCNC: 0.4 MG/DL (ref 0.1–1)
BILIRUB UR QL STRIP: NEGATIVE
BUN SERPL-MCNC: 11 MG/DL (ref 6–20)
CALCIUM SERPL-MCNC: 9.2 MG/DL (ref 8.7–10.5)
CHLORIDE SERPL-SCNC: 106 MMOL/L (ref 95–110)
CK SERPL-CCNC: 34 U/L (ref 20–180)
CLARITY UR REFRACT.AUTO: ABNORMAL
CO2 SERPL-SCNC: 20 MMOL/L (ref 23–29)
COLOR UR AUTO: ABNORMAL
CREAT SERPL-MCNC: 0.7 MG/DL (ref 0.5–1.4)
DIFFERENTIAL METHOD BLD: NORMAL
EOSINOPHIL # BLD AUTO: 0.2 K/UL (ref 0–0.5)
EOSINOPHIL NFR BLD: 3.7 % (ref 0–8)
ERYTHROCYTE [DISTWIDTH] IN BLOOD BY AUTOMATED COUNT: 13.5 % (ref 11.5–14.5)
EST. GFR  (NO RACE VARIABLE): >60 ML/MIN/1.73 M^2
GLUCOSE SERPL-MCNC: 87 MG/DL (ref 70–110)
GLUCOSE UR QL STRIP: NEGATIVE
HCT VFR BLD AUTO: 38.7 % (ref 37–48.5)
HCV AB SERPL QL IA: NORMAL
HGB BLD-MCNC: 13.2 G/DL (ref 12–16)
HGB UR QL STRIP: NEGATIVE
HIV 1+2 AB+HIV1 P24 AG SERPL QL IA: NORMAL
IMM GRANULOCYTES # BLD AUTO: 0.02 K/UL (ref 0–0.04)
IMM GRANULOCYTES NFR BLD AUTO: 0.3 % (ref 0–0.5)
KETONES UR QL STRIP: NEGATIVE
LEUKOCYTE ESTERASE UR QL STRIP: NEGATIVE
LYMPHOCYTES # BLD AUTO: 1.8 K/UL (ref 1–4.8)
LYMPHOCYTES NFR BLD: 27.9 % (ref 18–48)
MAGNESIUM SERPL-MCNC: 2 MG/DL (ref 1.6–2.6)
MCH RBC QN AUTO: 30.5 PG (ref 27–31)
MCHC RBC AUTO-ENTMCNC: 34.1 G/DL (ref 32–36)
MCV RBC AUTO: 89 FL (ref 82–98)
MONOCYTES # BLD AUTO: 0.6 K/UL (ref 0.3–1)
MONOCYTES NFR BLD: 8.8 % (ref 4–15)
NEUTROPHILS # BLD AUTO: 3.7 K/UL (ref 1.8–7.7)
NEUTROPHILS NFR BLD: 58.8 % (ref 38–73)
NITRITE UR QL STRIP: NEGATIVE
NRBC BLD-RTO: 0 /100 WBC
PH UR STRIP: 6 [PH] (ref 5–8)
PLATELET # BLD AUTO: 220 K/UL (ref 150–450)
PMV BLD AUTO: 10.1 FL (ref 9.2–12.9)
POTASSIUM SERPL-SCNC: 4.7 MMOL/L (ref 3.5–5.1)
PROT SERPL-MCNC: 7.8 G/DL (ref 6–8.4)
PROT UR QL STRIP: ABNORMAL
RBC # BLD AUTO: 4.33 M/UL (ref 4–5.4)
SARS-COV-2 RDRP RESP QL NAA+PROBE: NEGATIVE
SODIUM SERPL-SCNC: 135 MMOL/L (ref 136–145)
SP GR UR STRIP: >1.03 (ref 1–1.03)
TSH SERPL DL<=0.005 MIU/L-ACNC: 0.83 UIU/ML (ref 0.4–4)
URN SPEC COLLECT METH UR: ABNORMAL
WBC # BLD AUTO: 6.27 K/UL (ref 3.9–12.7)

## 2024-02-14 PROCEDURE — 3008F BODY MASS INDEX DOCD: CPT | Mod: CPTII,,, | Performed by: INTERNAL MEDICINE

## 2024-02-14 PROCEDURE — 83735 ASSAY OF MAGNESIUM: CPT | Performed by: STUDENT IN AN ORGANIZED HEALTH CARE EDUCATION/TRAINING PROGRAM

## 2024-02-14 PROCEDURE — 99284 EMERGENCY DEPT VISIT MOD MDM: CPT | Mod: 27

## 2024-02-14 PROCEDURE — 3080F DIAST BP >= 90 MM HG: CPT | Mod: CPTII,,, | Performed by: INTERNAL MEDICINE

## 2024-02-14 PROCEDURE — 1159F MED LIST DOCD IN RCRD: CPT | Mod: CPTII,,, | Performed by: INTERNAL MEDICINE

## 2024-02-14 PROCEDURE — 1160F RVW MEDS BY RX/DR IN RCRD: CPT | Mod: CPTII,,, | Performed by: INTERNAL MEDICINE

## 2024-02-14 PROCEDURE — 99999 PR PBB SHADOW E&M-EST. PATIENT-LVL V: CPT | Mod: PBBFAC,,, | Performed by: INTERNAL MEDICINE

## 2024-02-14 PROCEDURE — 99214 OFFICE O/P EST MOD 30 MIN: CPT | Mod: S$PBB,,, | Performed by: INTERNAL MEDICINE

## 2024-02-14 PROCEDURE — 82550 ASSAY OF CK (CPK): CPT | Performed by: STUDENT IN AN ORGANIZED HEALTH CARE EDUCATION/TRAINING PROGRAM

## 2024-02-14 PROCEDURE — U0002 COVID-19 LAB TEST NON-CDC: HCPCS | Performed by: STUDENT IN AN ORGANIZED HEALTH CARE EDUCATION/TRAINING PROGRAM

## 2024-02-14 PROCEDURE — 84443 ASSAY THYROID STIM HORMONE: CPT | Performed by: STUDENT IN AN ORGANIZED HEALTH CARE EDUCATION/TRAINING PROGRAM

## 2024-02-14 PROCEDURE — 3044F HG A1C LEVEL LT 7.0%: CPT | Mod: CPTII,,, | Performed by: INTERNAL MEDICINE

## 2024-02-14 PROCEDURE — 99215 OFFICE O/P EST HI 40 MIN: CPT | Mod: PBBFAC,PN | Performed by: INTERNAL MEDICINE

## 2024-02-14 PROCEDURE — 87389 HIV-1 AG W/HIV-1&-2 AB AG IA: CPT | Performed by: PHYSICIAN ASSISTANT

## 2024-02-14 PROCEDURE — 3077F SYST BP >= 140 MM HG: CPT | Mod: CPTII,,, | Performed by: INTERNAL MEDICINE

## 2024-02-14 PROCEDURE — 86803 HEPATITIS C AB TEST: CPT | Performed by: PHYSICIAN ASSISTANT

## 2024-02-14 PROCEDURE — 81003 URINALYSIS AUTO W/O SCOPE: CPT | Performed by: STUDENT IN AN ORGANIZED HEALTH CARE EDUCATION/TRAINING PROGRAM

## 2024-02-14 PROCEDURE — 85025 COMPLETE CBC W/AUTO DIFF WBC: CPT | Performed by: STUDENT IN AN ORGANIZED HEALTH CARE EDUCATION/TRAINING PROGRAM

## 2024-02-14 PROCEDURE — 87040 BLOOD CULTURE FOR BACTERIA: CPT | Performed by: STUDENT IN AN ORGANIZED HEALTH CARE EDUCATION/TRAINING PROGRAM

## 2024-02-14 PROCEDURE — 63600175 PHARM REV CODE 636 W HCPCS: Performed by: STUDENT IN AN ORGANIZED HEALTH CARE EDUCATION/TRAINING PROGRAM

## 2024-02-14 PROCEDURE — 80053 COMPREHEN METABOLIC PANEL: CPT | Performed by: STUDENT IN AN ORGANIZED HEALTH CARE EDUCATION/TRAINING PROGRAM

## 2024-02-14 RX ORDER — CETIRIZINE HYDROCHLORIDE 10 MG/1
10 TABLET ORAL
COMMUNITY
Start: 2024-01-27

## 2024-02-14 RX ADMIN — SODIUM CHLORIDE, POTASSIUM CHLORIDE, SODIUM LACTATE AND CALCIUM CHLORIDE 1000 ML: 600; 310; 30; 20 INJECTION, SOLUTION INTRAVENOUS at 08:02

## 2024-02-14 NOTE — TELEPHONE ENCOUNTER
Called and offered her a virtual today but she refused.  She said that she has to be seen in person.  She went to Urgent Care Monday.

## 2024-02-14 NOTE — Clinical Note
"Rosa "Rosa" Melvin Crowley was seen and treated in our emergency department on 2/14/2024.  She may return to work on 02/16/2024.       If you have any questions or concerns, please don't hesitate to call.      LUIS Phelps     "

## 2024-02-14 NOTE — PROGRESS NOTES
Subjective     Patient ID: Rosa Crowley is a 33 y.o. female.    Chief Complaint: Rash and Generalized Body Aches    Pt first noticed the rash started from her neck, spread to face chest and arms since Monday. Rash is painful and itchy -- feels like she's being pricked, mostly on face and neck, slowly getting worse and spreading. Pt daughter positive for covid and flu recently -- only sxm is fever, pt has tested negative for covid, flu, strp at urgent care. Was prescribed Kenalog, has not used it yet. Has tried benadryl - no changes, no recent exposure to new chemicals. Has also felt drained, diffuse muscle aches, fatigue. Has an itchy throat as well, SOB on exertion, raspy voice, has some mild edema AMY legs. Denies difficulty swallowing, congestion, n/v/d, cough, had a fever of 101 today at work. Of note, BP at this visit high at 148/100 -- pt states she normally does not run high, on chart, last visit was at 180/90 -- does not take any BP medications. Pt is fully vaccinated. Has a hx of rheumatoid arthritis -- not currently on meds and has standing appts with rheumatology. Recently had tooth pain and was on clindamycin, completed taking a week ago.     Rash  Associated symptoms include fatigue, a fever, shortness of breath and a sore throat. Pertinent negatives include no congestion, cough, diarrhea or vomiting.     Review of Systems   Constitutional:  Positive for appetite change, fatigue and fever. Negative for chills and unexpected weight change.        Decreased appetite   HENT:  Positive for sore throat and voice change. Negative for nasal congestion, ear pain and sneezing.    Eyes:  Negative for visual disturbance.   Respiratory:  Positive for shortness of breath. Negative for cough and wheezing.    Cardiovascular:  Negative for chest pain and palpitations.   Gastrointestinal:  Negative for blood in stool, diarrhea, nausea and vomiting.   Genitourinary:  Negative for difficulty urinating,  dysuria and hematuria.   Musculoskeletal:  Positive for myalgias.   Integumentary:  Positive for rash.        Diffuse macular/papular rash on face, neck, chest, and back.    Neurological:  Positive for dizziness. Negative for headaches.        Dizziness upon standing quickly   Hematological:  Positive for adenopathy.          Objective     Physical Exam  Constitutional:       Appearance: Normal appearance. She is normal weight.   HENT:      Nose: Nose normal.      Mouth/Throat:      Mouth: Mucous membranes are moist.      Pharynx: Oropharynx is clear.   Eyes:      Extraocular Movements: Extraocular movements intact.      Conjunctiva/sclera: Conjunctivae normal.   Cardiovascular:      Rate and Rhythm: Regular rhythm. Tachycardia present.      Heart sounds: Normal heart sounds.   Pulmonary:      Effort: Pulmonary effort is normal. No respiratory distress.      Breath sounds: Normal breath sounds. No wheezing or rales.   Abdominal:      General: Abdomen is flat.      Palpations: Abdomen is soft.   Musculoskeletal:         General: Normal range of motion.      Cervical back: Normal range of motion.   Skin:     General: Skin is warm and dry.      Findings: Rash present.      Comments: Maculopapular diffuse rash, confluent   Neurological:      General: No focal deficit present.      Mental Status: She is alert. Mental status is at baseline.   Psychiatric:         Mood and Affect: Mood normal.         Behavior: Behavior normal.            Assessment and Plan     1. Viral exanthem  Comments:  Patient skin rash similar to viral exanthem versus urticaria patient feel sick fever generalized weakness feel dehydrated not drinking eating refer to ER    2. Rheumatoid arthritis, involving unspecified site, unspecified whether rheumatoid factor present  Comments:  Patient does not want to take steroids due to bipolar disorder patient has appointment with Rheumatology in 2 weeks    3. PTSD (post-traumatic stress disorder)    4.  Tachycardia  Comments:  Probably secondary to stress from medical condition    5. Elevated blood pressure reading  Comments:  Secondary to stress from medical condition    6. Generalized weakness  Comments:  From dehydration from decreased p.o. intake    7. Bipolar 1 disorder        Pt with progressive skin rash appear like viral exanthem progressive getting worse feeling weak fever tachycardia elevated BP  seen [facility] negative covid flu and strep . Pt appear severely ill and toxic advice pt to ER for further w/u and treatment              No follow-ups on file.

## 2024-02-14 NOTE — Clinical Note
"Rosa "Rosa" Melvin Crowley was seen and treated in our emergency department on 2/14/2024.  She may return to work on 02/16/2024.       If you have any questions or concerns, please don't hesitate to call.      Mattie RN RN    "

## 2024-02-14 NOTE — TELEPHONE ENCOUNTER
----- Message from Ramya Hinkle sent at 2/14/2024  8:09 AM CST -----  Contact: Pt [phone]  No blue slot available to schedule an appointment for the patient.  Patient is established with which PCP: Rashawn  Reason for the visit: Rash on neck, back, and chest, sore throat, body aches, cough, and fever  Would the patient like a call back, or a response through their [facility] portal?:  Either            [address] [facility] [address]  Dept: [phone]  Dept Fax: [phone]: [phone]     Visit Date:  5/1/2023      Patient:  Sumaya Pepper  YOB: 2016    HPI:     Chief Complaint   Patient presents with    Eye Problem     Mom feels that pts pink eye is getting worse. Pt is having matting in his eyes every morning and mom has been doing to drops that were prescribed since Wednesday. Pt presents to the office today for follow up on conjunctivitis. Pt was started on eye drops (polytrim) 4 days ago and does not seem to be getting better. The itching is worse at night. They are doing a good job with hand washing, but he does itch at night. He has not been back to school since this started. Still having matting and redness. No vision problems. Still having a runny nose and allergies as well. He does take daily Claritin. Conjunctivitis   The current episode started 5 to 7 days ago. The onset was sudden. The problem has been unchanged. The problem is moderate. The symptoms are relieved by one or more prescription drugs and rest. Associated symptoms include eye itching, congestion, rhinorrhea, URI, eye discharge and eye redness. Pertinent negatives include no orthopnea, no fever, no decreased vision, no double vision, no photophobia, no abdominal pain, no constipation, no diarrhea, no ear discharge, no ear pain, no headaches, no hearing loss, no sore throat, no stridor, no muscle aches, no neck pain, no neck stiffness, no cough and no eye pain. The eye pain is moderate. Both eyes are affected. The eye pain is not associated with movement. The eyelid exhibits no abnormality. He has been Behaving normally. He has been Eating and drinking normally.  Urine output has been normal.     Medications    Current Outpatient Medications:     erythromycin (ROMYCIN) 5 MG/GM ophthalmic ointment, Place into both eyes every

## 2024-02-15 ENCOUNTER — TELEPHONE (OUTPATIENT)
Dept: PRIMARY CARE CLINIC | Facility: CLINIC | Age: 34
End: 2024-02-15
Payer: MEDICAID

## 2024-02-15 ENCOUNTER — PATIENT MESSAGE (OUTPATIENT)
Dept: PRIMARY CARE CLINIC | Facility: CLINIC | Age: 34
End: 2024-02-15
Payer: MEDICAID

## 2024-02-15 DIAGNOSIS — M13.0 POLYARTHRITIS: ICD-10-CM

## 2024-02-15 DIAGNOSIS — M06.9 RHEUMATOID ARTHRITIS, INVOLVING UNSPECIFIED SITE, UNSPECIFIED WHETHER RHEUMATOID FACTOR PRESENT: Primary | ICD-10-CM

## 2024-02-15 NOTE — DISCHARGE INSTRUCTIONS
Follow-up with your doctor, rheumatology, and dermatology. Return for any new or worsening symptoms.
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
## 2024-02-15 NOTE — ED NOTES
Patient identifiers verified and correct for  Ms Charles  C/C:  rash to chest, fatigue SEE NN  APPEARANCE: awake and alert in NAD. PAIN  6/10  SKIN: warm, dry  pinpoint red/pink rash to face and neck   MUSCULOSKELETAL: Patient moving all extremities spontaneously, no obvious swelling or deformities noted. Ambulates independently.  RESPIRATORY: Denies shortness of breath.Respirations unlabored.   CARDIAC: Denies CP, 2+ distal pulses; no peripheral edema  ABDOMEN: S/ND/NT, Denies nausea  : voids spontaneously, denies difficulty  Neurologic: AAO x 4; follows commands equal strength in all extremities; denies numbness/tingling. Denies dizziness Denies new weakness, all over gen fatigue

## 2024-02-15 NOTE — TELEPHONE ENCOUNTER
----- Message from Salome Palacios sent at 2/15/2024  9:52 AM CST -----  Contact: Self/ [phone]  [email or web address]     Patient is calling for Medical Advice regarding:ER visit on yesterday     Pharmacy name and phone#: [id number] [address] & [address] [address]  Phone: [phone] Fax: [phone]       Would like response via [email or web address]:  Would like a return call     Comments:pt is requesting a return call from the nurse due to she did not receive any answers or rx's for the visit she had at the ER and she is in pain. Please advise

## 2024-02-16 ENCOUNTER — NURSE TRIAGE (OUTPATIENT)
Dept: ADMINISTRATIVE | Facility: CLINIC | Age: 34
End: 2024-02-16
Payer: MEDICAID

## 2024-02-16 RX ORDER — HYDROCODONE BITARTRATE AND ACETAMINOPHEN 5; 325 MG/1; MG/1
1 TABLET ORAL EVERY 12 HOURS PRN
Qty: 10 TABLET | Refills: 0 | Status: SHIPPED | OUTPATIENT
Start: 2024-02-16 | End: 2024-02-23

## 2024-02-16 NOTE — TELEPHONE ENCOUNTER
Wednesday she saw pcp for body aches and her bp was 148/100 she was sent to er and it was 180/101. Bp now is 177/107 pulse 117. Headache 5/10, feels weak. Care advice recommend pt go to Er. Pt verbalized understanding.   Reason for Disposition   Systolic BP >= 160 OR Diastolic >= 100, and any cardiac (e.g., breathing difficulty, chest pain) or neurologic symptoms (e.g., new-onset blurred or double vision)    Additional Information   Negative: Sounds like a life-threatening emergency to the triager    Protocols used: Blood Pressure - High-A-OH

## 2024-02-19 LAB
BACTERIA BLD CULT: NORMAL
BACTERIA BLD CULT: NORMAL

## 2024-02-21 NOTE — TELEPHONE ENCOUNTER
Pt stated that she is still in pain pt is asking for pain management doctor to help with pain,   Pt is already seeing rheumatologist but will be out of her meds on Tuesday.  Do you want to put in a refferal to pain management or just wait until's he goes to rheumatologist?       Can you respond and reroute to me please

## 2024-02-22 NOTE — TELEPHONE ENCOUNTER
Let pt know I can't write norco any more if she need something for I only can write tramadol for a few  days we can rfer her to pain management

## 2024-02-23 RX ORDER — TRAMADOL HYDROCHLORIDE 50 MG/1
50 TABLET ORAL EVERY 12 HOURS PRN
Qty: 20 TABLET | Refills: 0 | Status: SHIPPED | OUTPATIENT
Start: 2024-02-23 | End: 2024-05-22

## 2024-02-27 ENCOUNTER — OFFICE VISIT (OUTPATIENT)
Dept: RHEUMATOLOGY | Facility: CLINIC | Age: 34
End: 2024-02-27
Payer: MEDICAID

## 2024-02-27 ENCOUNTER — HOSPITAL ENCOUNTER (OUTPATIENT)
Dept: RADIOLOGY | Facility: HOSPITAL | Age: 34
Discharge: HOME | End: 2024-02-27
Attending: STUDENT IN AN ORGANIZED HEALTH CARE EDUCATION/TRAINING PROGRAM
Payer: MEDICAID

## 2024-02-27 VITALS
DIASTOLIC BLOOD PRESSURE: 98 MMHG | WEIGHT: 189.13 LBS | SYSTOLIC BLOOD PRESSURE: 146 MMHG | BODY MASS INDEX: 32.29 KG/M2 | HEART RATE: 107 BPM | HEIGHT: 64 IN

## 2024-02-27 DIAGNOSIS — M79.641 PAIN IN BOTH HANDS: ICD-10-CM

## 2024-02-27 DIAGNOSIS — R21 RASH: ICD-10-CM

## 2024-02-27 DIAGNOSIS — M06.9 RHEUMATOID ARTHRITIS, INVOLVING UNSPECIFIED SITE, UNSPECIFIED WHETHER RHEUMATOID FACTOR PRESENT: ICD-10-CM

## 2024-02-27 DIAGNOSIS — M77.8 TENDONITIS OF FINGER: ICD-10-CM

## 2024-02-27 DIAGNOSIS — M79.642 PAIN IN BOTH HANDS: ICD-10-CM

## 2024-02-27 DIAGNOSIS — R76.8 ANA POSITIVE: Primary | ICD-10-CM

## 2024-02-27 DIAGNOSIS — I73.00 RAYNAUD'S DISEASE WITHOUT GANGRENE: ICD-10-CM

## 2024-02-27 DIAGNOSIS — R76.8 ANA POSITIVE: ICD-10-CM

## 2024-02-27 PROCEDURE — 3044F HG A1C LEVEL LT 7.0%: CPT | Mod: CPTII,,, | Performed by: STUDENT IN AN ORGANIZED HEALTH CARE EDUCATION/TRAINING PROGRAM

## 2024-02-27 PROCEDURE — 3008F BODY MASS INDEX DOCD: CPT | Mod: CPTII,,, | Performed by: STUDENT IN AN ORGANIZED HEALTH CARE EDUCATION/TRAINING PROGRAM

## 2024-02-27 PROCEDURE — 73130 X-RAY EXAM OF HAND: CPT | Mod: TC,50,FY

## 2024-02-27 PROCEDURE — 99215 OFFICE O/P EST HI 40 MIN: CPT | Mod: PBBFAC,PO | Performed by: STUDENT IN AN ORGANIZED HEALTH CARE EDUCATION/TRAINING PROGRAM

## 2024-02-27 PROCEDURE — 3077F SYST BP >= 140 MM HG: CPT | Mod: CPTII,,, | Performed by: STUDENT IN AN ORGANIZED HEALTH CARE EDUCATION/TRAINING PROGRAM

## 2024-02-27 PROCEDURE — 73130 X-RAY EXAM OF HAND: CPT | Mod: 26,50,, | Performed by: RADIOLOGY

## 2024-02-27 PROCEDURE — 99204 OFFICE O/P NEW MOD 45 MIN: CPT | Mod: S$PBB,,, | Performed by: STUDENT IN AN ORGANIZED HEALTH CARE EDUCATION/TRAINING PROGRAM

## 2024-02-27 PROCEDURE — 1159F MED LIST DOCD IN RCRD: CPT | Mod: CPTII,,, | Performed by: STUDENT IN AN ORGANIZED HEALTH CARE EDUCATION/TRAINING PROGRAM

## 2024-02-27 PROCEDURE — 99999 PR PBB SHADOW E&M-EST. PATIENT-LVL V: CPT | Mod: PBBFAC,,, | Performed by: STUDENT IN AN ORGANIZED HEALTH CARE EDUCATION/TRAINING PROGRAM

## 2024-02-27 PROCEDURE — 3080F DIAST BP >= 90 MM HG: CPT | Mod: CPTII,,, | Performed by: STUDENT IN AN ORGANIZED HEALTH CARE EDUCATION/TRAINING PROGRAM

## 2024-02-27 RX ORDER — ETODOLAC 400 MG/1
400 TABLET, FILM COATED ORAL 2 TIMES DAILY
Qty: 60 TABLET | Refills: 1 | Status: SHIPPED | OUTPATIENT
Start: 2024-02-27 | End: 2024-04-27

## 2024-02-27 NOTE — PROGRESS NOTES
Subjective:       Rosa Crowley is a 33 y.o. female who was referred by Selvin Morocho for evaluation of rheumatoid arthritis.  Symptoms have been present for 6 year. Onset was sudden. Symptoms include joint pain (worse at night), joint stiffness (worse in the AM), sleep disturbance due to pain, fatigue, and joint swelling in ankles, knees and hands and are severe in nature. Patient denies eye symptoms and skin nodules. Symptoms are made worse by: cold exposure, lying down, movement, overhead work, overuse, and resting.  Symptoms are helped by OTC pain medications (Ibuprofen 800mg and Tramadol- they help her sleep at night but do not take all discomfort away). Associated symptoms include arthralgia, fatigue, joint pain, morning stiffness, nausea, oral ulcers, rashes/photosensitive, and Raynaud's. Patient denies associated arthralgia, depression, fatigue, joint pain, morning stiffness, nausea, oral ulcers, rashes/photosensitive, and Raynaud's in the toes not in fingers. Overall disease activity is characterized as worse.  Limitation on activities include difficulty performing her job(works at ) and too fatigue to do anything after work - she has 2 girls and this has affected them too.   Cannot take steroids due to bipolar disease- steroid psychosis     Disease History:  Seropositive:  yes  Erosive:  unknown will send proper work up today   Radiographic stage:  unknown will send for xrays today   Functional class:  II    Previous therapies: Patient has had a course of DMARDs in the past- methotrexate but did not improve disease  Disease activity: assessed this visit.  Functional status: assessed this visit.  Disease prognosis: good.    Previous Report(s) Reviewed: ER records, imaging reports:  lab reports, and x-ray reports        Current Outpatient Medications   Medication Sig Dispense Refill    albuterol (PROVENTIL/VENTOLIN HFA) 90 mcg/actuation inhaler Inhale 2 puffs into the lungs every 4  (four) hours as needed for Shortness of Breath or Wheezing. 18 g 3    allopurinoL (ZYLOPRIM) 100 MG tablet Take 1 tablet (100 mg total) by mouth once daily. 90 tablet 3    buPROPion (WELLBUTRIN XL) 300 MG 24 hr tablet Take 450 mg by mouth every evening.      busPIRone (BUSPAR) 15 MG tablet Take 1 tablet (15 mg total) by mouth 3 (three) times daily. 60 tablet 1    butalbital-acetaminophen-caffeine -40 mg (FIORICET, ESGIC) -40 mg per tablet TAKE 1 TABLET BY MOUTH TWICE DAILY AS NEEDED FOR HEADACHE 30 tablet 0    cetirizine (ZYRTEC) 10 MG tablet Take 10 mg by mouth.      clonazePAM (KLONOPIN) 1 MG tablet Take 1 mg by mouth 2 (two) times daily as needed for Anxiety.      dextroamphetamine-amphetamine (ADDERALL) 20 mg tablet Take by mouth.      dextroamphetamine-amphetamine 30 mg Tab Take 1 tablet by mouth 2 (two) times daily.      diclofenac sodium (VOLTAREN) 1 % Gel Apply to painful finger  g 2    hydrOXYzine HCL (ATARAX) 25 MG tablet Take 1 tablet (25 mg total) by mouth 4 (four) times daily as needed for Itching. 45 tablet 2    ketorolac (TORADOL) 10 mg tablet Take 1 tablet (10 mg total) by mouth every 8 (eight) hours as needed for Pain. 12 tablet 0    ondansetron (ZOFRAN-ODT) 8 MG TbDL Take 1 tablet (8 mg total) by mouth 2 (two) times daily. 20 tablet 0    oxybutynin (DITROPAN) 5 MG Tab Take 1 tablet (5 mg total) by mouth 3 (three) times daily as needed (Bladder spasm/pain). 30 tablet 5    traMADoL (ULTRAM) 50 mg tablet Take 1 tablet (50 mg total) by mouth every 12 (twelve) hours as needed for Pain. 20 tablet 0    URIBEL 118-10-40.8-36 mg Cap Take 1 capsule by mouth 4 (four) times daily as needed (bladder pain). 30 capsule 11    VRAYLAR 3 mg Cap Take 3 mg by mouth.      betamethasone dipropionate 0.05 % cream Apply topically 2 (two) times daily. (Patient not taking: Reported on 2/27/2024) 45 g 1    estradioL (VIVELLE-DOT) 0.1 mg/24 hr PTSW Place 1 patch onto the skin twice a week. (Patient not  "taking: Reported on 2/27/2024) 8 patch 11    etodolac (LODINE) 400 MG tablet Take 1 tablet (400 mg total) by mouth 2 (two) times daily. 60 tablet 1    montelukast (SINGULAIR) 10 mg tablet Take 1 tablet (10 mg total) by mouth every evening. (Patient not taking: Reported on 2/27/2024) 30 tablet 2    triamcinolone acetonide 0.1% (KENALOG) 0.1 % cream Apply topically 2 (two) times daily. (Patient not taking: Reported on 2/27/2024) 30 g 0    triamcinolone acetonide 0.5% (KENALOG) 0.5 % Crea Apply topically 2 (two) times daily. (Patient not taking: Reported on 2/27/2024) 45 g 3     No current facility-administered medications for this visit.     Facility-Administered Medications Ordered in Other Visits   Medication Dose Route Frequency Provider Last Rate Last Admin    diphenhydrAMINE injection 25 mg  25 mg Intravenous Q6H PRN Selvin Villarreal MD        HYDROmorphone injection 0.5 mg  0.5 mg Intravenous Q5 Min PRN Selvin Villarreal MD   0.5 mg at 12/14/21 1337    lorazepam injection 0.25 mg  0.25 mg Intravenous Once PRN Selvin Villarreal MD        sodium chloride 0.9% bolus 250 mL  250 mL Intravenous Once Selvin Villarreal MD           Review of Systems    Answers submitted by the patient for this visit:  Rheumatology Questionnaire (Submitted on 2/20/2024)  fever: No  eye redness: No  mouth sores: No  headaches: Yes  shortness of breath: No  chest pain: No  trouble swallowing: No  diarrhea: No  constipation: No  unexpected weight change: No  genital sore: No  dysuria: No  During the last 3 days, have you had a skin rash?: Yes  Bruises or bleeds easily: No  cough: No     Objective:      BP (!) 146/98 (BP Location: Left arm, Patient Position: Sitting, BP Method: Large (Automatic))   Pulse 107   Ht 5' 4" (1.626 m)   Wt 85.8 kg (189 lb 2.5 oz)   LMP 02/10/2022   BMI 32.47 kg/m²   General: alert, appears stated age, cooperative, and fatigued   Lungs:  clear to auscultation bilaterally    Heart:   regular rate " and rhythm, S1, S2 normal, no murmur, click, rub or gallop, regularly irregular rhythm, and S1, S2 normal   Abdomen:   normal findings: no bruits heard   Swollen joints:   yes - 2,4,5 MCP on L hand and 2,3,R hand MCP   Tender joints:   yes - same as swollen    Joint Review:   ( 0=Absent, 1=Present )  Shoulder:   Left: Pain-1; Swelling-0, Right:  Pain-1; Swelling-0   Elbow:   Left: Pain-1; Swelling-1, Right:  Pain-1; Swelling-0   Wrist:   Left: Pain-0; Swelling-0, Right: Pain-0; Swelling-0   MCP I:  Left: Pain-0; Swelling-0, Right: Pain-0; Swelling-0   MCP II:  Left: Pain-1; Swelling-1, Right:  Pain-1; Swelling-1   MCP III:  Left: Pain-0; Swelling-0, Right:  Pain-1; Swelling-1   MCP IV:  Left: Pain-1; Swelling-1, Right: Pain-0; Swelling-0   MCP V:  Left: Pain-1; Swelling-1, Right: Pain-0; Swelling-0   PIP I:  Left: Pain-0; Swelling-0, Right: Pain-0; Swelling-0   PIP II:  Left: Pain-0; Swelling-0, Right: Pain-0; Swelling-0   PIP III:  Left: Pain-0; Swelling-0, Right: Pain-0; Swelling-0   PIP IV:  Left: Pain-0; Swelling-0, Right: Pain-0; Swelling-0   PIP V:  Left: Pain-0; Swelling-0, Right: Pain-0; Swelling-0   Knee:   Left: Pain-1; Swelling-1, Right:  Pain-1; Swelling-1   Ankle:   Left: Pain-1; Swelling-1, Right:  Pain-1; Swelling-1   MTP I:  Left: Pain-1; Swelling-0   MTP II:  Right:  Pain-1; Swelling-0   MTP III:  Right:  Pain-1; Swelling-0   MTP IV:  Right:  Pain-1; Swelling-0   MTP V:  Right:  Pain-1; Swelling-0       strength:  right and left difficulty making a fist    Tinels Test +:   Right:  not done  Left:  not done   Change in Nodules  no   Normal Neck ROM:  yes     Imaging  No results found for this or any previous visit.    No results found for this or any previous visit.    Results for orders placed during the hospital encounter of 01/09/24    X-Ray Chest PA And Lateral    Narrative  EXAMINATION:  XR CHEST PA AND LATERAL    CLINICAL HISTORY:  Rheumatoid arthritis, unspecified    TECHNIQUE:  PA and  lateral views of the chest were performed.    COMPARISON:  06/13/2023    FINDINGS:  There is no consolidation, effusion, or pneumothorax.  Cardiomediastinal silhouette is unremarkable.  Regional osseous structures are unremarkable.    Impression  No acute cardiopulmonary process.      Electronically signed by: Joshua Oseguera MD  Date:    01/09/2024  Time:    16:48      Lab Review    Results for orders placed or performed during the hospital encounter of 02/16/24   CBC auto differential   Result Value Ref Range    WBC 5.88 3.90 - 12.70 K/uL    RBC 4.27 4.00 - 5.40 M/uL    Hemoglobin 13.3 12.0 - 16.0 g/dL    Hematocrit 37.5 37.0 - 48.5 %    MCV 88 82 - 98 fL    MCH 31.1 (H) 27.0 - 31.0 pg    MCHC 35.5 32.0 - 36.0 g/dL    RDW 13.0 11.5 - 14.5 %    Platelets 228 150 - 450 K/uL    MPV 9.2 9.2 - 12.9 fL    Immature Granulocytes 0.2 0.0 - 0.5 %    Gran # (ANC) 3.6 1.8 - 7.7 K/uL    Immature Grans (Abs) 0.01 0.00 - 0.04 K/uL    Lymph # 1.6 1.0 - 4.8 K/uL    Mono # 0.5 0.3 - 1.0 K/uL    Eos # 0.2 0.0 - 0.5 K/uL    Baso # 0.03 0.00 - 0.20 K/uL    nRBC 0 0 /100 WBC    Gran % 61.5 38.0 - 73.0 %    Lymph % 26.4 18.0 - 48.0 %    Mono % 8.7 4.0 - 15.0 %    Eosinophil % 2.7 0.0 - 8.0 %    Basophil % 0.5 0.0 - 1.9 %    Differential Method Automated      Results for orders placed or performed during the hospital encounter of 02/16/24   Comprehensive metabolic panel (CMP)   Result Value Ref Range    Sodium 137 136 - 145 mmol/L    Potassium 3.8 3.5 - 5.1 mmol/L    Chloride 105 95 - 110 mmol/L    CO2 24 22 - 31 mmol/L    Glucose 105 70 - 110 mg/dL    BUN 11 7 - 18 mg/dL    Creatinine 0.70 0.50 - 1.40 mg/dL    Calcium 9.5 8.4 - 10.2 mg/dL    Total Protein 7.5 6.0 - 8.4 g/dL    Albumin 4.6 3.5 - 5.2 g/dL    Total Bilirubin 0.9 0.2 - 1.3 mg/dL    Alkaline Phosphatase 78 38 - 145 U/L    AST 21 14 - 36 U/L    ALT 14 0 - 35 U/L    Anion Gap 8 5 - 12 mmol/L    eGFR >60 >60 mL/min/1.73 m^2     Results for orders placed or performed in visit on  01/09/24   Sedimentation rate   Result Value Ref Range    Sed Rate 4 0 - 20 mm/Hr     Results for orders placed or performed in visit on 01/09/24   C-REACTIVE PROTEIN   Result Value Ref Range    CRP 8.0 0.0 - 8.2 mg/L     No results found for this or any previous visit.  Results for orders placed or performed in visit on 07/06/23   ELAINE Screen w/Reflex   Result Value Ref Range    ELAINE Screen Positive (A) Negative <1:80     Results for orders placed or performed in visit on 01/09/24   Rheumatoid Factor   Result Value Ref Range    Rheumatoid Factor 32.0 (H) 0.0 - 15.0 IU/mL       Eye Exam:  If On Plaquenil - not on HCQ         Assessment:      Rheumatoid Arthritis worsening  Remission:  no  CHRISTAL-DI: done      Plan:   33 yr old female with pmhx of RA diagnosed 6 years ago, placed on methotrexate with no improvement:   RA   ELAINE positive   Myofascial Pain Syndrome   RP  Rash    -  Medications:add Etodolac since Ibuprofen, Naproxen, Diclofenac and tramadol did not work to see if it will help with inflammation (she is getting  this week) without causing side effects - see Rx below, discussed risks (side effects) and benefits of medication & encouraged to read about medication  - Will change medication once we have more information if she has RA or SLE or MCTD since our next step in tx of RA is TNF inhibitors and they can cause Drug induced lupus.   - Labs:  CBC, CRP, Hepatitis Panel, ELAINE, Anti-Smith, Anti RNP, Anti-DS DNA, C3, C4, and sed rate and crp and TB testing   - Imaging: X-ray hands for baseline disease  - Discussion and guidance: Discussed importance of taking medication as directed, Discussed pros and cons and risks of joint injections, Recommended rest for painful and swollen joints, Recommended regular exercise program once pain and swelling is dec, also explained why we have to wait to start immunosuppression until after we received blood work back   Follow-up In 3 months.  Call if worsening or not  improving.  The patient has a documented glucocorticoid management plan within the past 12 months. 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  Greater than half the time spent was used for coordination of care and counseling of patient.     Radha Rodrigues M.D.  Rheumatology Department   [facility]

## 2024-03-02 ENCOUNTER — HOSPITAL ENCOUNTER (EMERGENCY)
Facility: HOSPITAL | Age: 34
Discharge: HOME | End: 2024-03-02
Attending: EMERGENCY MEDICINE
Payer: MEDICAID

## 2024-03-02 VITALS
RESPIRATION RATE: 16 BRPM | HEART RATE: 89 BPM | WEIGHT: 188 LBS | SYSTOLIC BLOOD PRESSURE: 132 MMHG | BODY MASS INDEX: 32.1 KG/M2 | HEIGHT: 64 IN | DIASTOLIC BLOOD PRESSURE: 78 MMHG | OXYGEN SATURATION: 98 % | TEMPERATURE: 99 F

## 2024-03-02 DIAGNOSIS — N30.90 CYSTITIS: Primary | ICD-10-CM

## 2024-03-02 LAB
B-HCG UR QL: NEGATIVE
BILIRUB UR QL STRIP: NEGATIVE
CLARITY UR: CLEAR
COLOR UR: YELLOW
CTP QC/QA: YES
GLUCOSE UR QL STRIP: NEGATIVE
HGB UR QL STRIP: ABNORMAL
KETONES UR QL STRIP: NEGATIVE
LEUKOCYTE ESTERASE UR QL STRIP: ABNORMAL
MICROSCOPIC COMMENT: ABNORMAL
NITRITE UR QL STRIP: NEGATIVE
PH UR STRIP: 6 [PH] (ref 5–8)
PROT UR QL STRIP: ABNORMAL
RBC #/AREA URNS HPF: 10 /HPF (ref 0–4)
SP GR UR STRIP: 1.03 (ref 1–1.03)
SQUAMOUS #/AREA URNS HPF: 2 /HPF
URN SPEC COLLECT METH UR: ABNORMAL
UROBILINOGEN UR STRIP-ACNC: NEGATIVE EU/DL
WBC #/AREA URNS HPF: 86 /HPF (ref 0–5)

## 2024-03-02 PROCEDURE — 87086 URINE CULTURE/COLONY COUNT: CPT | Performed by: EMERGENCY MEDICINE

## 2024-03-02 PROCEDURE — 25000003 PHARM REV CODE 250: Performed by: EMERGENCY MEDICINE

## 2024-03-02 PROCEDURE — 99283 EMERGENCY DEPT VISIT LOW MDM: CPT

## 2024-03-02 PROCEDURE — 87077 CULTURE AEROBIC IDENTIFY: CPT | Performed by: EMERGENCY MEDICINE

## 2024-03-02 PROCEDURE — 81000 URINALYSIS NONAUTO W/SCOPE: CPT | Performed by: EMERGENCY MEDICINE

## 2024-03-02 PROCEDURE — 81025 URINE PREGNANCY TEST: CPT | Performed by: EMERGENCY MEDICINE

## 2024-03-02 PROCEDURE — 87186 SC STD MICRODIL/AGAR DIL: CPT | Performed by: EMERGENCY MEDICINE

## 2024-03-02 RX ORDER — NITROFURANTOIN 25; 75 MG/1; MG/1
100 CAPSULE ORAL
Status: COMPLETED | OUTPATIENT
Start: 2024-03-02 | End: 2024-03-02

## 2024-03-02 RX ORDER — NITROFURANTOIN 25; 75 MG/1; MG/1
100 CAPSULE ORAL 2 TIMES DAILY
Qty: 14 CAPSULE | Refills: 0 | Status: SHIPPED | OUTPATIENT
Start: 2024-03-02 | End: 2024-03-09

## 2024-03-02 RX ADMIN — NITROFURANTOIN MONOHYDRATE/MACROCRYSTALS 100 MG: 25; 75 CAPSULE ORAL at 10:03
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
## 2024-03-03 NOTE — ED NOTES
Rosa Crowley presents to the ED with c/o burning with urination. Patient reports that she has cystitis and gets urinary tract infections often.      .   Mucous membranes are pink and moist. Skin is warm, dry and intact.  MATTHEW COKER  Verified patient's name and date of birth.

## 2024-03-04 ENCOUNTER — PATIENT MESSAGE (OUTPATIENT)
Dept: RHEUMATOLOGY | Facility: CLINIC | Age: 34
End: 2024-03-04
Payer: MEDICAID

## 2024-03-04 DIAGNOSIS — M06.9 RHEUMATOID ARTHRITIS, INVOLVING UNSPECIFIED SITE, UNSPECIFIED WHETHER RHEUMATOID FACTOR PRESENT: Primary | ICD-10-CM

## 2024-03-04 RX ORDER — ETANERCEPT 50 MG/ML
50 SOLUTION SUBCUTANEOUS WEEKLY
Qty: 4 ML | Refills: 11 | Status: SHIPPED | OUTPATIENT
Start: 2024-03-04 | End: 2025-03-04

## 2024-03-05 LAB — BACTERIA UR CULT: ABNORMAL

## 2024-04-18 ENCOUNTER — HOSPITAL ENCOUNTER (EMERGENCY)
Facility: HOSPITAL | Age: 34
Discharge: HOME | End: 2024-04-18
Attending: EMERGENCY MEDICINE
Payer: MEDICAID

## 2024-04-18 VITALS
TEMPERATURE: 98 F | DIASTOLIC BLOOD PRESSURE: 103 MMHG | HEIGHT: 64 IN | OXYGEN SATURATION: 99 % | RESPIRATION RATE: 20 BRPM | WEIGHT: 190 LBS | BODY MASS INDEX: 32.44 KG/M2 | HEART RATE: 93 BPM | SYSTOLIC BLOOD PRESSURE: 138 MMHG

## 2024-04-18 DIAGNOSIS — K08.89 PAIN, DENTAL: Primary | ICD-10-CM

## 2024-04-18 PROCEDURE — 96372 THER/PROPH/DIAG INJ SC/IM: CPT | Performed by: NURSE PRACTITIONER

## 2024-04-18 PROCEDURE — 99284 EMERGENCY DEPT VISIT MOD MDM: CPT | Mod: 25

## 2024-04-18 PROCEDURE — 63600175 PHARM REV CODE 636 W HCPCS: Performed by: NURSE PRACTITIONER

## 2024-04-18 RX ORDER — KETOROLAC TROMETHAMINE 30 MG/ML
15 INJECTION, SOLUTION INTRAMUSCULAR; INTRAVENOUS
Status: COMPLETED | OUTPATIENT
Start: 2024-04-18 | End: 2024-04-18

## 2024-04-18 RX ORDER — CLINDAMYCIN HYDROCHLORIDE 150 MG/1
300 CAPSULE ORAL 4 TIMES DAILY
Qty: 56 CAPSULE | Refills: 0 | Status: SHIPPED | OUTPATIENT
Start: 2024-04-18 | End: 2024-04-25

## 2024-04-18 RX ADMIN — KETOROLAC TROMETHAMINE 15 MG: 30 INJECTION, SOLUTION INTRAMUSCULAR at 07:04

## 2024-04-19 NOTE — ED NOTES
Pt is a 34 yo female presenting in the er for dental pain x2 days worsening today. Pt reports taking Tylenol and Motrin without relief.
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
## 2024-05-13 ENCOUNTER — PATIENT MESSAGE (OUTPATIENT)
Dept: OBSTETRICS AND GYNECOLOGY | Facility: CLINIC | Age: 34
End: 2024-05-13
Payer: MEDICAID

## 2024-05-13 ENCOUNTER — PATIENT MESSAGE (OUTPATIENT)
Dept: RHEUMATOLOGY | Facility: CLINIC | Age: 34
End: 2024-05-13
Payer: MEDICAID

## 2024-05-13 DIAGNOSIS — M79.646 THUMB PAIN, UNSPECIFIED LATERALITY: Primary | ICD-10-CM

## 2024-05-16 ENCOUNTER — OFFICE VISIT (OUTPATIENT)
Dept: OBSTETRICS AND GYNECOLOGY | Facility: CLINIC | Age: 34
End: 2024-05-16
Payer: MEDICAID

## 2024-05-16 VITALS
BODY MASS INDEX: 31.81 KG/M2 | SYSTOLIC BLOOD PRESSURE: 120 MMHG | HEIGHT: 64 IN | DIASTOLIC BLOOD PRESSURE: 88 MMHG | WEIGHT: 186.31 LBS

## 2024-05-16 DIAGNOSIS — N63.11 MASS OF UPPER OUTER QUADRANT OF RIGHT BREAST: Primary | ICD-10-CM

## 2024-05-16 DIAGNOSIS — N60.41 DUCT ECTASIA OF BREAST, RIGHT: ICD-10-CM

## 2024-05-16 PROCEDURE — 3044F HG A1C LEVEL LT 7.0%: CPT | Mod: CPTII,,, | Performed by: STUDENT IN AN ORGANIZED HEALTH CARE EDUCATION/TRAINING PROGRAM

## 2024-05-16 PROCEDURE — 99214 OFFICE O/P EST MOD 30 MIN: CPT | Mod: S$PBB,,, | Performed by: STUDENT IN AN ORGANIZED HEALTH CARE EDUCATION/TRAINING PROGRAM

## 2024-05-16 PROCEDURE — 3079F DIAST BP 80-89 MM HG: CPT | Mod: CPTII,,, | Performed by: STUDENT IN AN ORGANIZED HEALTH CARE EDUCATION/TRAINING PROGRAM

## 2024-05-16 PROCEDURE — 99215 OFFICE O/P EST HI 40 MIN: CPT | Mod: PBBFAC,PN | Performed by: STUDENT IN AN ORGANIZED HEALTH CARE EDUCATION/TRAINING PROGRAM

## 2024-05-16 PROCEDURE — 99999 PR PBB SHADOW E&M-EST. PATIENT-LVL V: CPT | Mod: PBBFAC,,, | Performed by: STUDENT IN AN ORGANIZED HEALTH CARE EDUCATION/TRAINING PROGRAM

## 2024-05-16 PROCEDURE — 3074F SYST BP LT 130 MM HG: CPT | Mod: CPTII,,, | Performed by: STUDENT IN AN ORGANIZED HEALTH CARE EDUCATION/TRAINING PROGRAM

## 2024-05-16 PROCEDURE — 1159F MED LIST DOCD IN RCRD: CPT | Mod: CPTII,,, | Performed by: STUDENT IN AN ORGANIZED HEALTH CARE EDUCATION/TRAINING PROGRAM

## 2024-05-16 PROCEDURE — 3008F BODY MASS INDEX DOCD: CPT | Mod: CPTII,,, | Performed by: STUDENT IN AN ORGANIZED HEALTH CARE EDUCATION/TRAINING PROGRAM
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
## 2024-05-20 ENCOUNTER — PATIENT MESSAGE (OUTPATIENT)
Dept: UROLOGY | Facility: CLINIC | Age: 34
End: 2024-05-20
Payer: MEDICAID

## 2024-05-20 NOTE — TELEPHONE ENCOUNTER
If she is able to find a pharmacy that has it in stock we can change the Rx.  Otherwise if the amitriptyline is working well that is the primary medication I want her to use.  The Uribel is meant for flares.    Thanks, M

## 2024-05-21 ENCOUNTER — HOSPITAL ENCOUNTER (EMERGENCY)
Facility: HOSPITAL | Age: 34
Discharge: HOME | End: 2024-05-21
Attending: EMERGENCY MEDICINE
Payer: MEDICAID

## 2024-05-21 VITALS
RESPIRATION RATE: 18 BRPM | DIASTOLIC BLOOD PRESSURE: 94 MMHG | BODY MASS INDEX: 32.27 KG/M2 | SYSTOLIC BLOOD PRESSURE: 139 MMHG | TEMPERATURE: 98 F | OXYGEN SATURATION: 100 % | HEART RATE: 78 BPM | HEIGHT: 64 IN | WEIGHT: 189 LBS

## 2024-05-21 DIAGNOSIS — N30.90 CYSTITIS: Primary | ICD-10-CM

## 2024-05-21 LAB
ALBUMIN SERPL BCP-MCNC: 4 G/DL (ref 3.5–5.2)
ALP SERPL-CCNC: 57 U/L (ref 55–135)
ALT SERPL W/O P-5'-P-CCNC: 8 U/L (ref 10–44)
ANION GAP SERPL CALC-SCNC: 9 MMOL/L (ref 8–16)
AST SERPL-CCNC: 12 U/L (ref 10–40)
BASOPHILS # BLD AUTO: 0.02 K/UL (ref 0–0.2)
BASOPHILS NFR BLD: 0.4 % (ref 0–1.9)
BILIRUB SERPL-MCNC: 0.5 MG/DL (ref 0.1–1)
BILIRUB UR QL STRIP: NEGATIVE
BUN SERPL-MCNC: 7 MG/DL (ref 6–20)
CALCIUM SERPL-MCNC: 9 MG/DL (ref 8.7–10.5)
CHLORIDE SERPL-SCNC: 107 MMOL/L (ref 95–110)
CLARITY UR: ABNORMAL
CO2 SERPL-SCNC: 20 MMOL/L (ref 23–29)
COLOR UR: YELLOW
CREAT SERPL-MCNC: 0.7 MG/DL (ref 0.5–1.4)
DIFFERENTIAL METHOD BLD: ABNORMAL
EOSINOPHIL # BLD AUTO: 0.1 K/UL (ref 0–0.5)
EOSINOPHIL NFR BLD: 2.4 % (ref 0–8)
ERYTHROCYTE [DISTWIDTH] IN BLOOD BY AUTOMATED COUNT: 12.6 % (ref 11.5–14.5)
EST. GFR  (NO RACE VARIABLE): >60 ML/MIN/1.73 M^2
GLUCOSE SERPL-MCNC: 83 MG/DL (ref 70–110)
GLUCOSE UR QL STRIP: NEGATIVE
HCT VFR BLD AUTO: 36.2 % (ref 37–48.5)
HGB BLD-MCNC: 12.7 G/DL (ref 12–16)
HGB UR QL STRIP: NEGATIVE
IMM GRANULOCYTES # BLD AUTO: 0 K/UL (ref 0–0.04)
IMM GRANULOCYTES NFR BLD AUTO: 0 % (ref 0–0.5)
KETONES UR QL STRIP: NEGATIVE
LEUKOCYTE ESTERASE UR QL STRIP: NEGATIVE
LIPASE SERPL-CCNC: 27 U/L (ref 4–60)
LYMPHOCYTES # BLD AUTO: 1.7 K/UL (ref 1–4.8)
LYMPHOCYTES NFR BLD: 34.1 % (ref 18–48)
MCH RBC QN AUTO: 31.7 PG (ref 27–31)
MCHC RBC AUTO-ENTMCNC: 35.1 G/DL (ref 32–36)
MCV RBC AUTO: 90 FL (ref 82–98)
MONOCYTES # BLD AUTO: 0.5 K/UL (ref 0.3–1)
MONOCYTES NFR BLD: 9.8 % (ref 4–15)
NEUTROPHILS # BLD AUTO: 2.7 K/UL (ref 1.8–7.7)
NEUTROPHILS NFR BLD: 53.3 % (ref 38–73)
NITRITE UR QL STRIP: NEGATIVE
NRBC BLD-RTO: 0 /100 WBC
PH UR STRIP: 7 [PH] (ref 5–8)
PLATELET # BLD AUTO: 199 K/UL (ref 150–450)
PMV BLD AUTO: 9.3 FL (ref 9.2–12.9)
POTASSIUM SERPL-SCNC: 3.9 MMOL/L (ref 3.5–5.1)
PROT SERPL-MCNC: 6.8 G/DL (ref 6–8.4)
PROT UR QL STRIP: NEGATIVE
RBC # BLD AUTO: 4.01 M/UL (ref 4–5.4)
SODIUM SERPL-SCNC: 136 MMOL/L (ref 136–145)
SP GR UR STRIP: 1.01 (ref 1–1.03)
URN SPEC COLLECT METH UR: ABNORMAL
UROBILINOGEN UR STRIP-ACNC: NEGATIVE EU/DL
WBC # BLD AUTO: 5.01 K/UL (ref 3.9–12.7)

## 2024-05-21 PROCEDURE — 99285 EMERGENCY DEPT VISIT HI MDM: CPT | Mod: 25

## 2024-05-21 PROCEDURE — 80053 COMPREHEN METABOLIC PANEL: CPT | Performed by: PHYSICIAN ASSISTANT

## 2024-05-21 PROCEDURE — 85025 COMPLETE CBC W/AUTO DIFF WBC: CPT | Performed by: PHYSICIAN ASSISTANT

## 2024-05-21 PROCEDURE — 83690 ASSAY OF LIPASE: CPT | Performed by: PHYSICIAN ASSISTANT

## 2024-05-21 PROCEDURE — 25500020 PHARM REV CODE 255

## 2024-05-21 PROCEDURE — 81003 URINALYSIS AUTO W/O SCOPE: CPT | Performed by: PHYSICIAN ASSISTANT

## 2024-05-21 PROCEDURE — 36415 COLL VENOUS BLD VENIPUNCTURE: CPT | Performed by: PHYSICIAN ASSISTANT

## 2024-05-21 RX ORDER — CEFUROXIME AXETIL 500 MG/1
500 TABLET ORAL EVERY 12 HOURS
Qty: 10 TABLET | Refills: 0 | Status: SHIPPED | OUTPATIENT
Start: 2024-05-21 | End: 2024-05-26

## 2024-05-21 RX ORDER — GABAPENTIN 300 MG/1
300 CAPSULE ORAL 3 TIMES DAILY
Qty: 30 CAPSULE | Refills: 0 | Status: SHIPPED | OUTPATIENT
Start: 2024-05-21 | End: 2024-05-31

## 2024-05-21 RX ADMIN — IOHEXOL 100 ML: 350 INJECTION, SOLUTION INTRAVENOUS at 08:05

## 2024-05-21 NOTE — FIRST PROVIDER EVALUATION
Emergency Department TeleTriage Encounter Note      CHIEF COMPLAINT    Chief Complaint   Patient presents with    Abdominal Pain     Patient has been painful urination on Sunday states she has IC and it flares from time to time, bowel movement was all mucous and the pain is increasing        VITAL SIGNS   Initial Vitals [05/21/24 1754]   BP Pulse Resp Temp SpO2   (!) 178/100 100 20 98.4 °F (36.9 °C) 100 %      MAP       --            ALLERGIES    Review of patient's allergies indicates:   Allergen Reactions    Amoxicillin Hives    Corticosteroids (glucocorticoids) Other (See Comments)    Azithromycin Rash     Yeast infection       PROVIDER TRIAGE NOTE  Patient presents with lower abdominal pain and dysuria. No vaginal discharge. Also reports some constipation and feel like abdomen is swollen and painful on the left.       ORDERS  Labs Reviewed - No data to display    ED Orders (720h ago, onward)      None              Virtual Visit Note: The provider triage portion of this emergency department evaluation and documentation was performed via [employer], a HIPAA-compliant telemedicine application, in concert with a tele-presenter in the room. A face to face patient evaluation with one of my colleagues will occur once the patient is placed in an emergency department room.      DISCLAIMER: This note was prepared with [employer] voice recognition transcription software. Garbled syntax, mangled pronouns, and other bizarre constructions may be attributed to that software system.

## 2024-05-21 NOTE — ED NOTES
Assumed care from:  XXX:  Rosa Crowley is awake, alert and oriented x 3, skin warm and dry, in NAD.  Patient CO left sided abd pain since Sunday, also CO dysuria.    Patient identifiers for Rosa Crowley checked and correct.  LOC:  Rosa Crowley is awake, alert, and aware of environment with an appropriate affect. She is oriented x 3 and speaking appropriately.  APPEARANCE:  She is resting comfortably and in no acute distress. She is clean and well groomed, patient's clothing is properly fastened.  SKIN:  The skin is warm and dry. She has normal skin turgor and moist mucus membranes. Skin is intact; no bruising or breakdown noted.  MUSCULOSKELETAL:  She is moving all extremities well, no obvious deformities noted. Pulses intact.   RESPIRATORY:  Airway is open and patent. Respirations are spontaneous and non-labored with normal effort and rate.  CARDIAC:  She has a normal rate and rhythm. No peripheral edema noted. Capillary refill < 3 seconds.  ABDOMEN:  No distention noted.  Soft and non-tender upon palpation.  Abd pain, dysuria  NEUROLOGICAL:  PERRL. Facial expression is symmetrical. Hand grasps are equal bilaterally. Normal sensation in all extremities when touched with finger.  Allergies reported:    Review of patient's allergies indicates:   Allergen Reactions    Amoxicillin Hives    Corticosteroids (glucocorticoids) Other (See Comments)    Azithromycin Rash     Yeast infection

## 2024-05-22 ENCOUNTER — TELEPHONE (OUTPATIENT)
Dept: PRIMARY CARE CLINIC | Facility: CLINIC | Age: 34
End: 2024-05-22
Payer: MEDICAID

## 2024-05-22 ENCOUNTER — PATIENT MESSAGE (OUTPATIENT)
Dept: SURGERY | Facility: CLINIC | Age: 34
End: 2024-05-22
Payer: MEDICAID

## 2024-05-22 ENCOUNTER — OFFICE VISIT (OUTPATIENT)
Dept: PRIMARY CARE CLINIC | Facility: CLINIC | Age: 34
End: 2024-05-22
Payer: MEDICAID

## 2024-05-22 DIAGNOSIS — M06.9 RHEUMATOID ARTHRITIS, INVOLVING UNSPECIFIED SITE, UNSPECIFIED WHETHER RHEUMATOID FACTOR PRESENT: Primary | ICD-10-CM

## 2024-05-22 DIAGNOSIS — R10.30 LOWER ABDOMINAL PAIN: ICD-10-CM

## 2024-05-22 DIAGNOSIS — R16.2 HEPATOSPLENOMEGALY: ICD-10-CM

## 2024-05-22 DIAGNOSIS — K59.00 CONSTIPATION, UNSPECIFIED CONSTIPATION TYPE: ICD-10-CM

## 2024-05-22 DIAGNOSIS — R10.84 GENERALIZED ABDOMINAL PAIN: ICD-10-CM

## 2024-05-22 DIAGNOSIS — R16.1 SPLENOMEGALY: ICD-10-CM

## 2024-05-22 PROCEDURE — 1159F MED LIST DOCD IN RCRD: CPT | Mod: CPTII,95,, | Performed by: INTERNAL MEDICINE

## 2024-05-22 PROCEDURE — 1160F RVW MEDS BY RX/DR IN RCRD: CPT | Mod: CPTII,95,, | Performed by: INTERNAL MEDICINE

## 2024-05-22 PROCEDURE — 99214 OFFICE O/P EST MOD 30 MIN: CPT | Mod: 95,,, | Performed by: INTERNAL MEDICINE

## 2024-05-22 PROCEDURE — 3044F HG A1C LEVEL LT 7.0%: CPT | Mod: CPTII,95,, | Performed by: INTERNAL MEDICINE

## 2024-05-22 RX ORDER — HYDROCODONE BITARTRATE AND ACETAMINOPHEN 5; 325 MG/1; MG/1
1 TABLET ORAL EVERY 12 HOURS PRN
Qty: 14 TABLET | Refills: 0 | Status: SHIPPED | OUTPATIENT
Start: 2024-05-22
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
## 2024-05-22 NOTE — TELEPHONE ENCOUNTER
----- Message from Makenna Trujillo LPN sent at 5/22/2024 12:49 PM CDT -----  Regarding: FW: advice ER last night  Contact: [phone]  Patient is trying to speak to someone and they keep sending her message to us.  ----- Message -----  From: Tosha Denson  Sent: 5/22/2024   8:24 AM CDT  To: Aliyah Parsons Staff  Subject: advice ER last night                             The patient called requesting to speak to Nurse. Seen in ER last night. Please call at your earliest opportunity to advise     No further information provided    Patient can be contacted @# [phone]

## 2024-05-22 NOTE — PROGRESS NOTES
Subjective:    The patient location is: home  The chief complaint leading to consultation is: RA pressure in bladder constipation and back pain     Visit type: audiovisual    Face to Face time with patient: 20   minutes of total time spent on the encounter, which includes face to face time and non-face to face time preparing to see the patient (eg, review of tests), Obtaining and/or reviewing separately obtained history, Documenting clinical information in the electronic or other health record, Independently interpreting results (not separately reported) and communicating results to the patient/family/caregiver, or Care coordination (not separately reported).         Each patient to whom he or she provides medical services by telemedicine is:  (1) informed of the relationship between the physician and patient and the respective role of any other health care provider with respect to management of the patient; and (2) notified that he or she may decline to receive medical services by telemedicine and may withdraw from such care at any time.    Notes:     Patient ID: Rosa Crowley is a 33 y.o. female.    Chief Complaint: No chief complaint on file.    HPI  Pt with h/o RA  rt braest nodule await MRI ordered by GYN  IC anxiety  PTSD and ADD  pt has been seen by rheumatology pt today c/o constipation she try enema only mucous come out have to take laxative  she also c/o pressure in bladder she had hsyterectomy  2 yrs ago and c/o upper left side hurt in the back . She had CT abd pelvic recently with hepatospleenomegaly signifant worse than abdominal u/s couple months ago her lasb liver normal CBC normal  Review of Systems    Objective:      Physical Exam  Constitutional:       General: She is not in acute distress.     Appearance: Normal appearance.      Comments: Anxious nervous   Pulmonary:      Effort: Pulmonary effort is normal.   Abdominal:      Tenderness: There is no abdominal tenderness.    Musculoskeletal:         General: Tenderness (subjective tenderess left upper back) present.   Neurological:      Mental Status: She is alert and oriented to person, place, and time.   Psychiatric:         Mood and Affect: Mood normal.         Thought Content: Thought content normal.         Judgment: Judgment normal.         Assessment:       1. Rheumatoid arthritis, involving unspecified site, unspecified whether rheumatoid factor present    2. Splenomegaly    3. Lower abdominal pain    4. Hepatosplenomegaly    5. Generalized abdominal pain    6. Constipation, unspecified constipation type        Plan:       Rheumatoid arthritis, involving unspecified site, unspecified whether rheumatoid factor present  Comments:  already seen by rehumatology currently o NSAIDS not tolerating steroid psychosis  w/u in progress f/u with rheumatology    Splenomegaly  -     Ambulatory referral/consult to Hematology / Oncology; Future; Expected date: 05/23/2024    Lower abdominal pain  Comments:  U/A normal  CT scan no acute change in pelvic had hysterectomy    Hepatosplenomegaly  Comments:  repeat u/s in 3 months  Orders:  -     Ambulatory referral/consult to Hematology / Oncology; Future; Expected date: 05/23/2024    Generalized abdominal pain  -     Ambulatory referral/consult to Hematology / Oncology; Future; Expected date: 05/23/2024  -     HYDROcodone-acetaminophen (NORCO) 5-325 mg per tablet; Take 1 tablet by mouth every 12 (twelve) hours as needed for Pain.  Dispense: 14 tablet; Refill: 0    Constipation, unspecified constipation type  -     polyethylene glycol (COLYTE) 240-22.72-6.72 -5.84 gram SolR; 2000 ml po x 1 dose if no result in 2 hrs repeat another dose 2000 ml  Dispense: 4000 mL; Refill: 0        Medication List with Changes/Refills   New Medications    HYDROCODONE-ACETAMINOPHEN (NORCO) 5-325 MG PER TABLET    Take 1 tablet by mouth every 12 (twelve) hours as needed for Pain.    POLYETHYLENE GLYCOL (COLYTE)  240-22.72-6.72 -5.84 GRAM SOLR    2000 ml po x 1 dose if no result in 2 hrs repeat another dose 2000 ml   Current Medications    ALBUTEROL (PROVENTIL/VENTOLIN HFA) 90 MCG/ACTUATION INHALER    Inhale 2 puffs into the lungs every 4 (four) hours as needed for Shortness of Breath or Wheezing.    BUPROPION (WELLBUTRIN XL) 300 MG 24 HR TABLET    Take 450 mg by mouth every evening.    BUSPIRONE (BUSPAR) 15 MG TABLET    Take 1 tablet (15 mg total) by mouth 3 (three) times daily.    BUTALBITAL-ACETAMINOPHEN-CAFFEINE -40 MG (FIORICET, ESGIC) -40 MG PER TABLET    TAKE 1 TABLET BY MOUTH TWICE DAILY AS NEEDED FOR HEADACHE    CEFUROXIME (CEFTIN) 500 MG TABLET    Take 1 tablet (500 mg total) by mouth every 12 (twelve) hours. for 5 days    CETIRIZINE (ZYRTEC) 10 MG TABLET    Take 10 mg by mouth.    DEXTROAMPHETAMINE-AMPHETAMINE (ADDERALL) 20 MG TABLET    Take by mouth.    DEXTROAMPHETAMINE-AMPHETAMINE 30 MG TAB    Take 1 tablet by mouth 2 (two) times daily.    DICLOFENAC SODIUM (VOLTAREN) 1 % GEL    Apply to painful finger QID    ETANERCEPT (ENBREL SURECLICK) 50 MG/ML (1 ML)    Inject 1 mL (50 mg total) into the skin once a week.    GABAPENTIN (NEURONTIN) 300 MG CAPSULE    Take 1 capsule (300 mg total) by mouth 3 (three) times daily. for 10 days    HYDROXYZINE HCL (ATARAX) 25 MG TABLET    Take 1 tablet (25 mg total) by mouth 4 (four) times daily as needed for Itching.    ONDANSETRON (ZOFRAN-ODT) 8 MG TBDL    Take 1 tablet (8 mg total) by mouth 2 (two) times daily.    OXYBUTYNIN (DITROPAN) 5 MG TAB    Take 1 tablet (5 mg total) by mouth 3 (three) times daily as needed (Bladder spasm/pain).    URIBEL 118-10-40.8-36 MG CAP    Take 1 capsule by mouth 4 (four) times daily as needed (bladder pain).    VRAYLAR 3 MG CAP    Take 3 mg by mouth.   Discontinued Medications    ALLOPURINOL (ZYLOPRIM) 100 MG TABLET    Take 1 tablet (100 mg total) by mouth once daily.    BETAMETHASONE DIPROPIONATE 0.05 % CREAM    Apply topically 2  (two) times daily.    CLONAZEPAM (KLONOPIN) 1 MG TABLET    Take 1 mg by mouth 2 (two) times daily as needed for Anxiety.    ESTRADIOL (VIVELLE-DOT) 0.1 MG/24 HR PTSW    Place 1 patch onto the skin twice a week.    KETOROLAC (TORADOL) 10 MG TABLET    Take 1 tablet (10 mg total) by mouth every 8 (eight) hours as needed for Pain.    MONTELUKAST (SINGULAIR) 10 MG TABLET    Take 1 tablet (10 mg total) by mouth every evening.    TRAMADOL (ULTRAM) 50 MG TABLET    Take 1 tablet (50 mg total) by mouth every 12 (twelve) hours as needed for Pain.    TRIAMCINOLONE ACETONIDE 0.1% (KENALOG) 0.1 % CREAM    Apply topically 2 (two) times daily.    TRIAMCINOLONE ACETONIDE 0.5% (KENALOG) 0.5 % CREA    Apply topically 2 (two) times daily.

## 2024-05-23 ENCOUNTER — PATIENT MESSAGE (OUTPATIENT)
Dept: PRIMARY CARE CLINIC | Facility: CLINIC | Age: 34
End: 2024-05-23
Payer: MEDICAID

## 2024-05-23 ENCOUNTER — OFFICE VISIT (OUTPATIENT)
Dept: HEMATOLOGY/ONCOLOGY | Facility: CLINIC | Age: 34
End: 2024-05-23
Payer: MEDICAID

## 2024-05-23 DIAGNOSIS — R16.2 HEPATOSPLENOMEGALY: ICD-10-CM

## 2024-05-23 DIAGNOSIS — R10.84 GENERALIZED ABDOMINAL PAIN: ICD-10-CM

## 2024-05-23 DIAGNOSIS — R16.0 HEPATOMEGALY: Primary | ICD-10-CM

## 2024-05-23 DIAGNOSIS — R16.1 SPLENOMEGALY: ICD-10-CM

## 2024-05-23 PROCEDURE — 3044F HG A1C LEVEL LT 7.0%: CPT | Mod: CPTII,95,, | Performed by: INTERNAL MEDICINE

## 2024-05-23 PROCEDURE — 99204 OFFICE O/P NEW MOD 45 MIN: CPT | Mod: 95,,, | Performed by: INTERNAL MEDICINE

## 2024-05-23 NOTE — TELEPHONE ENCOUNTER
Called patient in regards to her message. Patient stated she went to see the Hem/Onc and the doctor told her she is not sure why she came to see her and that she can not do anything for you. Patient also said that you told her she need to stay out of work for now, but she is unsure how long you wanted her to stay out of work. Patient also stated she needed a note for work.

## 2024-05-24 ENCOUNTER — TELEPHONE (OUTPATIENT)
Dept: PRIMARY CARE CLINIC | Facility: CLINIC | Age: 34
End: 2024-05-24
Payer: MEDICAID

## 2024-05-24 ENCOUNTER — TELEPHONE (OUTPATIENT)
Dept: HEPATOLOGY | Facility: CLINIC | Age: 34
End: 2024-05-24
Payer: MEDICAID

## 2024-05-24 NOTE — TELEPHONE ENCOUNTER
Spoke with patient in regards to the message from Dr. Morocho in regards to her being referred to Hepatology. Patient verbalized understanding. Patient also would like to know if she can have a work note to returned to work and how long will she be out.

## 2024-05-24 NOTE — TELEPHONE ENCOUNTER
----- Message from Nini Keating sent at 5/24/2024 11:30 AM CDT -----   Hepatosplenomegaly [R16.2 please call patient back to schedule

## 2024-05-24 NOTE — TELEPHONE ENCOUNTER
----- Message from Sapna Davison sent at 5/23/2024  3:08 PM CDT -----  Contact: [phone]  [email or web address]     Patient is calling for Medical Advice regarding:speak to the office     How long has patient had these symptoms:    Pharmacy name and phone#:    Would like response via [facility]:  no     Comments:  Pt is asking for a call back she states she does not know what is going on rught now she states she spoke with the hematologist and she states they told her they could not help her
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
## 2024-05-28 ENCOUNTER — OFFICE VISIT (OUTPATIENT)
Dept: RHEUMATOLOGY | Facility: CLINIC | Age: 34
End: 2024-05-28
Payer: MEDICAID

## 2024-05-28 VITALS
BODY MASS INDEX: 31.57 KG/M2 | SYSTOLIC BLOOD PRESSURE: 133 MMHG | TEMPERATURE: 99 F | DIASTOLIC BLOOD PRESSURE: 98 MMHG | HEART RATE: 119 BPM | OXYGEN SATURATION: 98 % | RESPIRATION RATE: 18 BRPM | HEIGHT: 64 IN | WEIGHT: 184.94 LBS

## 2024-05-28 DIAGNOSIS — M77.8 TENDONITIS OF FINGER: ICD-10-CM

## 2024-05-28 DIAGNOSIS — R76.8 ANA POSITIVE: ICD-10-CM

## 2024-05-28 DIAGNOSIS — R16.1 SPLENOMEGALY: ICD-10-CM

## 2024-05-28 DIAGNOSIS — M06.9 RHEUMATOID ARTHRITIS, INVOLVING UNSPECIFIED SITE, UNSPECIFIED WHETHER RHEUMATOID FACTOR PRESENT: Primary | ICD-10-CM

## 2024-05-28 DIAGNOSIS — I73.00 RAYNAUD'S DISEASE WITHOUT GANGRENE: ICD-10-CM

## 2024-05-28 DIAGNOSIS — R16.0 HEPATOMEGALY: ICD-10-CM

## 2024-05-28 PROCEDURE — 99214 OFFICE O/P EST MOD 30 MIN: CPT | Mod: PBBFAC,PO | Performed by: STUDENT IN AN ORGANIZED HEALTH CARE EDUCATION/TRAINING PROGRAM

## 2024-05-28 PROCEDURE — 3080F DIAST BP >= 90 MM HG: CPT | Mod: CPTII,,, | Performed by: STUDENT IN AN ORGANIZED HEALTH CARE EDUCATION/TRAINING PROGRAM

## 2024-05-28 PROCEDURE — 99214 OFFICE O/P EST MOD 30 MIN: CPT | Mod: S$PBB,,, | Performed by: STUDENT IN AN ORGANIZED HEALTH CARE EDUCATION/TRAINING PROGRAM

## 2024-05-28 PROCEDURE — 1159F MED LIST DOCD IN RCRD: CPT | Mod: CPTII,,, | Performed by: STUDENT IN AN ORGANIZED HEALTH CARE EDUCATION/TRAINING PROGRAM

## 2024-05-28 PROCEDURE — 3044F HG A1C LEVEL LT 7.0%: CPT | Mod: CPTII,,, | Performed by: STUDENT IN AN ORGANIZED HEALTH CARE EDUCATION/TRAINING PROGRAM

## 2024-05-28 PROCEDURE — 99999 PR PBB SHADOW E&M-EST. PATIENT-LVL IV: CPT | Mod: PBBFAC,,, | Performed by: STUDENT IN AN ORGANIZED HEALTH CARE EDUCATION/TRAINING PROGRAM

## 2024-05-28 PROCEDURE — 3008F BODY MASS INDEX DOCD: CPT | Mod: CPTII,,, | Performed by: STUDENT IN AN ORGANIZED HEALTH CARE EDUCATION/TRAINING PROGRAM

## 2024-05-28 PROCEDURE — 3075F SYST BP GE 130 - 139MM HG: CPT | Mod: CPTII,,, | Performed by: STUDENT IN AN ORGANIZED HEALTH CARE EDUCATION/TRAINING PROGRAM

## 2024-05-28 NOTE — PROGRESS NOTES
Subjective:       Rosa Crowley is a 33 y.o. female who was referred by Selvin Morocho for evaluation of rheumatoid arthritis.  Symptoms have been present for 6 year. Onset was sudden. Symptoms include joint pain (worse at night), joint stiffness (worse in the AM), sleep disturbance due to pain, fatigue, and joint swelling in ankles, knees and hands and are severe in nature. Patient denies eye symptoms and skin nodules. Symptoms are made worse by: cold exposure, lying down, movement, overhead work, overuse, and resting.  Symptoms are helped by OTC pain medications (Ibuprofen 800mg and Tramadol- they help her sleep at night but do not take all discomfort away). Associated symptoms include arthralgia, fatigue, joint pain, morning stiffness, nausea, oral ulcers, rashes/photosensitive, and Raynaud's. Patient denies associated arthralgia, depression, fatigue, joint pain, morning stiffness, nausea, oral ulcers, rashes/photosensitive, and Raynaud's in the toes not in fingers. Overall disease activity is characterized as worse.  Limitation on activities include difficulty performing her job(works at ) and too fatigue to do anything after work - she has 2 girls and this has affected them too.   Cannot take steroids due to bipolar disease- steroid psychosis     Today: Patient doing Enbrel and is working with less fatigue and less joint pain. She is having photosensitive rash everyday. Also feel early satiety and bloated stomach - went to ED and CT abdomen showing hepatosplenomegaly with normal LFTs and CBC. Hematology had a virtual appointment and told patient that there is nothing that was concerning to them. Has appt with hepatology     Disease History:  Seropositive:  yes  Erosive:  unknown will send proper work up today   Radiographic stage:  unknown will send for xrays today   Functional class:  II    Previous therapies: Patient has had a course of DMARDs in the past- methotrexate but did not  improve disease  Disease activity: assessed this visit.  Functional status: assessed this visit.  Disease prognosis: good.    Previous Report(s) Reviewed: ER records, imaging reports:  lab reports, and x-ray reports        Current Outpatient Medications   Medication Sig Dispense Refill    albuterol (PROVENTIL/VENTOLIN HFA) 90 mcg/actuation inhaler Inhale 2 puffs into the lungs every 4 (four) hours as needed for Shortness of Breath or Wheezing. 18 g 3    buPROPion (WELLBUTRIN XL) 300 MG 24 hr tablet Take 450 mg by mouth every evening.      butalbital-acetaminophen-caffeine -40 mg (FIORICET, ESGIC) -40 mg per tablet TAKE 1 TABLET BY MOUTH TWICE DAILY AS NEEDED FOR HEADACHE 30 tablet 0    cetirizine (ZYRTEC) 10 MG tablet Take 10 mg by mouth.      dextroamphetamine-amphetamine (ADDERALL) 20 mg tablet Take by mouth.      dextroamphetamine-amphetamine 30 mg Tab Take 1 tablet by mouth 2 (two) times daily.      diclofenac sodium (VOLTAREN) 1 % Gel Apply to painful finger  g 2    etanercept (ENBREL SURECLICK) 50 mg/mL (1 mL) Inject 1 mL (50 mg total) into the skin once a week. 4 mL 11    gabapentin (NEURONTIN) 300 MG capsule Take 1 capsule (300 mg total) by mouth 3 (three) times daily. for 10 days 30 capsule 0    HYDROcodone-acetaminophen (NORCO) 5-325 mg per tablet Take 1 tablet by mouth every 12 (twelve) hours as needed for Pain. 14 tablet 0    hydrOXYzine HCL (ATARAX) 25 MG tablet Take 1 tablet (25 mg total) by mouth 4 (four) times daily as needed for Itching. 45 tablet 2    ondansetron (ZOFRAN-ODT) 8 MG TbDL Take 1 tablet (8 mg total) by mouth 2 (two) times daily. 20 tablet 0    oxybutynin (DITROPAN) 5 MG Tab Take 1 tablet (5 mg total) by mouth 3 (three) times daily as needed (Bladder spasm/pain). 30 tablet 5    polyethylene glycol (COLYTE) 240-22.72-6.72 -5.84 gram SolR 2000 ml po x 1 dose if no result in 2 hrs repeat another dose 2000 ml 4000 mL 0    URIBEL 118-10-40.8-36 mg Cap Take 1 capsule by  "mouth 4 (four) times daily as needed (bladder pain). 30 capsule 11    VRAYLAR 3 mg Cap Take 3 mg by mouth.      busPIRone (BUSPAR) 15 MG tablet Take 1 tablet (15 mg total) by mouth 3 (three) times daily. 60 tablet 1     No current facility-administered medications for this visit.     Facility-Administered Medications Ordered in Other Visits   Medication Dose Route Frequency Provider Last Rate Last Admin    diphenhydrAMINE injection 25 mg  25 mg Intravenous Q6H PRN Selvin Villarreal MD        HYDROmorphone injection 0.5 mg  0.5 mg Intravenous Q5 Min PRN Selvin Villarreal MD   0.5 mg at 12/14/21 1337    lorazepam injection 0.25 mg  0.25 mg Intravenous Once PRN Selvin Villarreal MD        sodium chloride 0.9% bolus 250 mL  250 mL Intravenous Once Selvin Villarreal MD           Review of Systems    Answers submitted by the patient for this visit:  Rheumatology Questionnaire (Submitted on 2/20/2024)  fever: No  eye redness: No  mouth sores: No  headaches: Yes  shortness of breath: No  chest pain: No  trouble swallowing: No  diarrhea: No  constipation: No  unexpected weight change: No  genital sore: No  dysuria: No  During the last 3 days, have you had a skin rash?: Yes  Bruises or bleeds easily: No  cough: No     Objective:      BP (!) 133/98 (BP Location: Left arm, Patient Position: Sitting, BP Method: Large (Automatic))   Pulse (!) 119   Temp 98.6 °F (37 °C) (Oral)   Resp 18   Ht 5' 4" (1.626 m)   Wt 83.9 kg (184 lb 15.5 oz)   LMP 02/10/2022   SpO2 98%   BMI 31.75 kg/m²   General: alert, appears stated age, cooperative, and fatigued   Lungs:  clear to auscultation bilaterally    Heart:   regular rate and rhythm, S1, S2 normal, no murmur, click, rub or gallop, regularly irregular rhythm, and S1, S2 normal   Abdomen:   normal findings: no bruits heard   Swollen joints:   yes - 2,4,5 MCP on L hand and 2,3,R hand MCP   Tender joints:   yes - same as swollen    Joint Review:   ( 0=Absent, 1=Present " )  Shoulder:   Left: Pain-1; Swelling-0, Right:  Pain-1; Swelling-0   Elbow:   Left: Pain-1; Swelling-1, Right:  Pain-1; Swelling-0   Wrist:   Left: Pain-0; Swelling-0, Right: Pain-0; Swelling-0   MCP I:  Left: Pain-0; Swelling-0, Right: Pain-0; Swelling-0   MCP II:  Left: Pain-1; Swelling-1, Right:  Pain-1; Swelling-1   MCP III:  Left: Pain-0; Swelling-0, Right:  Pain-1; Swelling-1   MCP IV:  Left: Pain-1; Swelling-1, Right: Pain-0; Swelling-0   MCP V:  Left: Pain-1; Swelling-1, Right: Pain-0; Swelling-0   PIP I:  Left: Pain-0; Swelling-0, Right: Pain-0; Swelling-0   PIP II:  Left: Pain-0; Swelling-0, Right: Pain-0; Swelling-0   PIP III:  Left: Pain-0; Swelling-0, Right: Pain-0; Swelling-0   PIP IV:  Left: Pain-0; Swelling-0, Right: Pain-0; Swelling-0   PIP V:  Left: Pain-0; Swelling-0, Right: Pain-0; Swelling-0   Knee:   Left: Pain-1; Swelling-1, Right:  Pain-1; Swelling-1   Ankle:   Left: Pain-1; Swelling-1, Right:  Pain-1; Swelling-1   MTP I:  Left: Pain-1; Swelling-0   MTP II:  Right:  Pain-1; Swelling-0   MTP III:  Right:  Pain-1; Swelling-0   MTP IV:  Right:  Pain-1; Swelling-0   MTP V:  Right:  Pain-1; Swelling-0       strength:  right and left difficulty making a fist    Tinels Test +:   Right:  not done  Left:  not done   Change in Nodules  no   Normal Neck ROM:  yes     Imaging  No results found for this or any previous visit.    No results found for this or any previous visit.    Results for orders placed during the hospital encounter of 01/09/24    X-Ray Chest PA And Lateral    Narrative  EXAMINATION:  XR CHEST PA AND LATERAL    CLINICAL HISTORY:  Rheumatoid arthritis, unspecified    TECHNIQUE:  PA and lateral views of the chest were performed.    COMPARISON:  06/13/2023    FINDINGS:  There is no consolidation, effusion, or pneumothorax.  Cardiomediastinal silhouette is unremarkable.  Regional osseous structures are unremarkable.    Impression  No acute cardiopulmonary process.      Electronically  signed by: Joshua Oseguera MD  Date:    01/09/2024  Time:    16:48      Lab Review    Results for orders placed or performed during the hospital encounter of 05/21/24   CBC W/ AUTO DIFFERENTIAL   Result Value Ref Range    WBC 5.01 3.90 - 12.70 K/uL    RBC 4.01 4.00 - 5.40 M/uL    Hemoglobin 12.7 12.0 - 16.0 g/dL    Hematocrit 36.2 (L) 37.0 - 48.5 %    MCV 90 82 - 98 fL    MCH 31.7 (H) 27.0 - 31.0 pg    MCHC 35.1 32.0 - 36.0 g/dL    RDW 12.6 11.5 - 14.5 %    Platelets 199 150 - 450 K/uL    MPV 9.3 9.2 - 12.9 fL    Immature Granulocytes 0.0 0.0 - 0.5 %    Gran # (ANC) 2.7 1.8 - 7.7 K/uL    Immature Grans (Abs) 0.00 0.00 - 0.04 K/uL    Lymph # 1.7 1.0 - 4.8 K/uL    Mono # 0.5 0.3 - 1.0 K/uL    Eos # 0.1 0.0 - 0.5 K/uL    Baso # 0.02 0.00 - 0.20 K/uL    nRBC 0 0 /100 WBC    Gran % 53.3 38.0 - 73.0 %    Lymph % 34.1 18.0 - 48.0 %    Mono % 9.8 4.0 - 15.0 %    Eosinophil % 2.4 0.0 - 8.0 %    Basophil % 0.4 0.0 - 1.9 %    Differential Method Automated      Results for orders placed or performed during the hospital encounter of 05/21/24   Comp. Metabolic Panel   Result Value Ref Range    Sodium 136 136 - 145 mmol/L    Potassium 3.9 3.5 - 5.1 mmol/L    Chloride 107 95 - 110 mmol/L    CO2 20 (L) 23 - 29 mmol/L    Glucose 83 70 - 110 mg/dL    BUN 7 6 - 20 mg/dL    Creatinine 0.7 0.5 - 1.4 mg/dL    Calcium 9.0 8.7 - 10.5 mg/dL    Total Protein 6.8 6.0 - 8.4 g/dL    Albumin 4.0 3.5 - 5.2 g/dL    Total Bilirubin 0.5 0.1 - 1.0 mg/dL    Alkaline Phosphatase 57 55 - 135 U/L    AST 12 10 - 40 U/L    ALT 8 (L) 10 - 44 U/L    eGFR >60 >60 mL/min/1.73 m^2    Anion Gap 9 8 - 16 mmol/L     Results for orders placed or performed in visit on 02/27/24   Sedimentation rate   Result Value Ref Range    Sed Rate 14 0 - 36 mm/Hr     Results for orders placed or performed in visit on 02/27/24   C-Reactive Protein   Result Value Ref Range    CRP 3.2 0.0 - 8.2 mg/L     No results found for this or any previous visit.  Results for orders placed or  performed in visit on 07/06/23   ELAINE Screen w/Reflex   Result Value Ref Range    ELAINE Screen Positive (A) Negative <1:80     Results for orders placed or performed in visit on 01/09/24   Rheumatoid Factor   Result Value Ref Range    Rheumatoid Factor 32.0 (H) 0.0 - 15.0 IU/mL       Eye Exam:  If On Plaquenil - not on HCQ         Assessment:      Rheumatoid Arthritis worsening  Remission:  no  CHRISTAL-DI: done      Plan:   33 yr old female with pmhx of RA diagnosed 6 years ago, placed on methotrexate with no improvement:   RA   ELAINE positive   Myofascial Pain Syndrome   RP  Rash    -  Medications: at this time I advised to not take any NSAIDs until hepatosplenomegaly is resolved.  add Etodolac since Ibuprofen, Naproxen, Diclofenac and tramadol did not work to see if it will help with inflammation (she is getting  this week) without causing side effects - see Rx below, discussed risks (side effects) and benefits of medication & encouraged to read about medication  - she has photosensitive rash that could be explained by a lupus like reaction but medication(Enbrel ) is wirking and does not affect liver.   - I will change medication until we have more info about hepatosplenomegaly   - Labs:  CBC, CRP, Hepatitis Panel, ELAINE, Anti-Smith, Anti RNP, Anti-DS DNA, C3, C4, and sed rate and crp and TB testing - all normal   - Imaging: X-ray hands for baseline disease-- normal   - Discussion and guidance: Discussed importance of taking medication as directed, Discussed pros and cons and risks of joint injections, Recommended rest for painful and swollen joints, Recommended regular exercise program once pain and swelling is dec, also explained why we have to wait to start immunosuppression until after we received blood work back   Follow-up In 3 months.  Call if worsening or not improving.  The patient does not have a documented glucocorticoid management plan within the past 12 months. -- she has contraindication to steroids-  steroid psychosis     Method of contact with patient concerns: [facility]     Time spent: 30 minutes in face to face discussion concerning diagnosis, prognosis, review of lab and test results, benefits of treatment as well as management of disease, counseling of patient and coordination of care between various health care providers.  Greater than half the time spent was used for coordination of care and counseling of patient.     Radha Rodrigues M.D.  Rheumatology Department   [facility]      Answers submitted by the patient for this visit:  Rheumatology Questionnaire (Submitted on 5/21/2024)  fever: No  eye redness: No  mouth sores: No  headaches: Yes  shortness of breath: No  chest pain: No  trouble swallowing: No  diarrhea: No  constipation: Yes  unexpected weight change: No  genital sore: No  dysuria: Yes  During the last 3 days, have you had a skin rash?: No  Bruises or bleeds easily: No  cough: No

## 2024-05-30 ENCOUNTER — PATIENT MESSAGE (OUTPATIENT)
Dept: RHEUMATOLOGY | Facility: CLINIC | Age: 34
End: 2024-05-30
Payer: MEDICAID

## 2024-05-30 ENCOUNTER — TELEPHONE (OUTPATIENT)
Dept: HEPATOLOGY | Facility: CLINIC | Age: 34
End: 2024-05-30
Payer: MEDICAID

## 2024-05-30 NOTE — TELEPHONE ENCOUNTER
I called the pt and acknowledge her concerned.And I informed her that I raised this concerned to Ms Fitzgerald I'm waiting for the response.I told her to call her again as soon as I got the answer,probably tomorrow or on Monday for update.

## 2024-06-03 ENCOUNTER — TELEPHONE (OUTPATIENT)
Dept: HEPATOLOGY | Facility: CLINIC | Age: 34
End: 2024-06-03
Payer: MEDICAID

## 2024-06-03 NOTE — TELEPHONE ENCOUNTER
----- Message from Maria Isabel William sent at 6/3/2024  2:32 PM CDT -----  Regarding: Consult/Advisory  Contact: Rosa  Consult/Advisory     Name Of Caller:Rosa Crowley          Contact Preference: Contact Information  [phone] (Mobile)            Nature of call:pt is calling to check on the updated status of a phone call for a sooner appt. Please advise. Requesting a call back.

## 2024-06-03 NOTE — TELEPHONE ENCOUNTER
Returned call to the patient.  Rescheduled appt to 7/26/2024 virtual visit with Ms. Shandavicente.  Patient confirmed and agreed with the appt.  Reminder letter mailed.

## 2024-06-08 ENCOUNTER — OFFICE VISIT (OUTPATIENT)
Dept: URGENT CARE | Facility: CLINIC | Age: 34
End: 2024-06-08
Payer: MEDICAID

## 2024-06-08 VITALS
TEMPERATURE: 98 F | BODY MASS INDEX: 30.73 KG/M2 | DIASTOLIC BLOOD PRESSURE: 92 MMHG | RESPIRATION RATE: 16 BRPM | SYSTOLIC BLOOD PRESSURE: 131 MMHG | OXYGEN SATURATION: 98 % | HEIGHT: 64 IN | WEIGHT: 180 LBS | HEART RATE: 103 BPM

## 2024-06-08 DIAGNOSIS — B96.89 BACTERIAL SINUSITIS: Primary | ICD-10-CM

## 2024-06-08 DIAGNOSIS — R05.9 COUGH, UNSPECIFIED TYPE: ICD-10-CM

## 2024-06-08 DIAGNOSIS — J32.9 BACTERIAL SINUSITIS: Primary | ICD-10-CM

## 2024-06-08 LAB
CTP QC/QA: YES
FLUAV AG NPH QL: NEGATIVE
FLUBV AG NPH QL: NEGATIVE
S PYO RRNA THROAT QL PROBE: NEGATIVE
SARS-COV-2 AG RESP QL IA.RAPID: NEGATIVE

## 2024-06-08 PROCEDURE — 87880 STREP A ASSAY W/OPTIC: CPT | Mod: QW,,, | Performed by: NURSE PRACTITIONER

## 2024-06-08 PROCEDURE — 99204 OFFICE O/P NEW MOD 45 MIN: CPT | Mod: S$GLB,,, | Performed by: NURSE PRACTITIONER

## 2024-06-08 PROCEDURE — 87811 SARS-COV-2 COVID19 W/OPTIC: CPT | Mod: QW,S$GLB,, | Performed by: NURSE PRACTITIONER

## 2024-06-08 PROCEDURE — 87804 INFLUENZA ASSAY W/OPTIC: CPT | Mod: 59,QW,, | Performed by: NURSE PRACTITIONER

## 2024-06-08 RX ORDER — CEFDINIR 300 MG/1
300 CAPSULE ORAL 2 TIMES DAILY
Qty: 14 CAPSULE | Refills: 0 | Status: SHIPPED | OUTPATIENT
Start: 2024-06-08 | End: 2024-06-15

## 2024-06-08 NOTE — PROGRESS NOTES
"Subjective:      Patient ID: Rosa Crowley is a 33 y.o. female.    Vitals:  height is 5' 4" (1.626 m) and weight is 81.6 kg (180 lb). Her temperature is 98.4 °F (36.9 °C). Her blood pressure is 131/92 (abnormal) and her pulse is 103. Her respiration is 16 and oxygen saturation is 98%.     Chief Complaint: Cough    Rosa Crowley is a 33 year old female presenting to the clinic with a 6 day history of sore throat, headache, cough, congestion, fever, green mucus. Pt states she is taking immuno suppressants. Has been taking tylenol and OTC cough/cold medication with some improvement. Feels symptoms are worsening.     Cough  This is a new problem. Episode onset: x6 days. Associated symptoms include chills, a fever, headaches, myalgias and a sore throat.       Constitution: Positive for chills and fever.   HENT:  Positive for congestion and sore throat.    Neck: neck negative.   Cardiovascular: Negative.    Respiratory:  Positive for cough.    Gastrointestinal: Negative.    Genitourinary: Negative.    Musculoskeletal:  Positive for muscle ache.   Skin: Negative.    Neurological:  Positive for headaches.      Objective:     Physical Exam   Constitutional: She is oriented to person, place, and time. She appears well-developed. She is cooperative.  Non-toxic appearance. She does not appear ill. No distress.   HENT:   Head: Normocephalic and atraumatic.   Ears:   Right Ear: Hearing, tympanic membrane, external ear and ear canal normal.   Left Ear: Hearing, tympanic membrane, external ear and ear canal normal.   Nose: Nose normal. No mucosal edema, rhinorrhea or nasal deformity. No epistaxis. Right sinus exhibits no maxillary sinus tenderness and no frontal sinus tenderness. Left sinus exhibits no maxillary sinus tenderness and no frontal sinus tenderness.   Mouth/Throat: Uvula is midline and mucous membranes are normal. No trismus in the jaw. Normal dentition. No uvula swelling. Posterior " oropharyngeal erythema present. No oropharyngeal exudate or posterior oropharyngeal edema.   Eyes: Conjunctivae and lids are normal. No scleral icterus.   Neck: Trachea normal and phonation normal. Neck supple. No edema present. No erythema present. No neck rigidity present.   Cardiovascular: Normal rate, regular rhythm, normal heart sounds and normal pulses.   Pulmonary/Chest: Effort normal and breath sounds normal. No respiratory distress. She has no decreased breath sounds. She has no rhonchi.   Abdominal: Normal appearance.   Musculoskeletal: Normal range of motion.         General: No deformity. Normal range of motion.   Neurological: She is alert and oriented to person, place, and time. She exhibits normal muscle tone. Coordination normal.   Skin: Skin is warm, dry, intact, not diaphoretic and not pale.   Psychiatric: Her speech is normal and behavior is normal. Judgment and thought content normal.   Nursing note and vitals reviewed.      Assessment:     1. Bacterial sinusitis    2. Cough, unspecified type        Plan:   Patient is on immunosuppressants and has had fever and worsening upper respiratory symptoms x 7 days. Will start cefdinir for suspected bacterial sinusitis. Strict ED precautions discussed and she verbalized understanding. No adventitious lung sounds or hypoxia.     Bacterial sinusitis    Cough, unspecified type  -     SARS Coronavirus 2 Antigen, POCT Manual Read  -     POCT Influenza A/B Rapid Antigen  -     POCT rapid strep A    Other orders  -     cefdinir (OMNICEF) 300 MG capsule; Take 1 capsule (300 mg total) by mouth 2 (two) times daily. for 7 days  Dispense: 14 capsule; Refill: 0

## 2024-06-14 ENCOUNTER — PATIENT MESSAGE (OUTPATIENT)
Dept: RHEUMATOLOGY | Facility: CLINIC | Age: 34
End: 2024-06-14
Payer: MEDICAID

## 2024-06-17 PROBLEM — N64.52 DISCHARGE FROM RIGHT NIPPLE: Status: ACTIVE | Noted: 2024-06-17

## 2024-06-26 ENCOUNTER — HOSPITAL ENCOUNTER (OUTPATIENT)
Dept: RADIOLOGY | Facility: HOSPITAL | Age: 34
Discharge: HOME | End: 2024-06-26
Attending: INTERNAL MEDICINE
Payer: MEDICAID

## 2024-06-26 DIAGNOSIS — R16.2 HEPATOSPLENOMEGALY: ICD-10-CM

## 2024-06-26 PROCEDURE — 76700 US EXAM ABDOM COMPLETE: CPT | Mod: 26,,, | Performed by: RADIOLOGY

## 2024-06-26 PROCEDURE — 76700 US EXAM ABDOM COMPLETE: CPT | Mod: TC

## 2024-07-03 ENCOUNTER — TELEPHONE (OUTPATIENT)
Dept: HEPATOLOGY | Facility: CLINIC | Age: 34
End: 2024-07-03
Payer: MEDICAID

## 2024-07-03 ENCOUNTER — OFFICE VISIT (OUTPATIENT)
Dept: HEPATOLOGY | Facility: CLINIC | Age: 34
End: 2024-07-03
Payer: MEDICAID

## 2024-07-03 DIAGNOSIS — E66.09 CLASS 1 OBESITY DUE TO EXCESS CALORIES WITH SERIOUS COMORBIDITY AND BODY MASS INDEX (BMI) OF 30.0 TO 30.9 IN ADULT: ICD-10-CM

## 2024-07-03 DIAGNOSIS — R16.2 HEPATOSPLENOMEGALY: Primary | ICD-10-CM

## 2024-07-03 NOTE — TELEPHONE ENCOUNTER
----- Message from Russell Juarez NP sent at 7/3/2024  1:49 PM CDT -----  Please schedule patient for   1. Follow up in 2 months with fibroscan same day

## 2024-07-03 NOTE — PROGRESS NOTES
The patient location is: [place], [place]  The chief complaint leading to consultation is: Hepatosplenomegaly    Visit type: audiovisual    Face to Face time with patient: 22  45 minutes of total time spent on the encounter, which includes face to face time and non-face to face time preparing to see the patient (eg, review of tests), Obtaining and/or reviewing separately obtained history, Documenting clinical information in the electronic or other health record, Independently interpreting results (not separately reported) and communicating results to the patient/family/caregiver, or Care coordination (not separately reported).         Each patient to whom he or she provides medical services by telemedicine is:  (1) informed of the relationship between the physician and patient and the respective role of any other health care provider with respect to management of the patient; and (2) notified that he or she may decline to receive medical services by telemedicine and may withdraw from such care at any time.    Notes:      [facility] - New Patient    PCP: Selvin Morocho MD     Chief Complaint:  Hepatosplenomegaly     HISTORY       HPI: This is a 33 y.o. patient with PMH noted below, presenting for evaluation of  fatty liver disease     Previous serologic w/u negative for viral hepatitis     Prior serologic workup:   Lab Results   Component Value Date    ANASCREEN Positive (A) 07/06/2023    HEPBSAG Negative 07/12/2019    HEPCAB Non-reactive 02/14/2024    HEPAIGM Negative 07/12/2019       Interval HPI: Presents today alone. Hx of Hepatosplenomegaly since 2013 that will return to normal size and then become enlarged again. Most recent US both spleen and liver with WNL. Previous CT completed 5/2024 noted hepatosplenomegaly. History of RA & raynaud's and is followed by rheum. MTX use in past, stopped 6 years ago.       LABS & DIAGNOSTIC STUDIES      Labs done 5/2024 show normal transaminase levels   Platelets WNL,  alk phos WNL  Synthetic liver functioning WNL    Lab Results   Component Value Date    ALT 8 (L) 05/21/2024    AST 12 05/21/2024    ALKPHOS 57 05/21/2024    BILITOT 0.5 05/21/2024    ALBUMIN 4.0 05/21/2024    INR 1.0 06/13/2023     05/21/2024     Imaging:  Abd U/S done 6/2024 noted  FINDINGS:  Pancreas: The visualized portions of pancreas appear normal.     Aorta: No aneurysm.     Liver: 16 cm, normal in size. Homogeneous parenchymal echotexture. No focal lesions.     Gallbladder: The gallbladder is surgically absent.     Biliary system: 5.9 mm common bile duct.  No intrahepatic ductal dilatation.     Inferior vena cava: Normal in appearance.     Right kidney: 9.9 cm. No hydronephrosis.     Left kidney: 10.8 cm. No hydronephrosis.     Spleen: 12 cm.  Normal in size with homogeneous echotexture.     Miscellaneous: No ascites.     Impression:     Prior cholecystectomy.  Otherwise negative abdomen ultrasound    Liver fibrosis staging:  -- fibroscan - will obtain     No alcohol consumption, see below  Social History     Substance and Sexual Activity   Alcohol Use Never       Immunity to Hep A and B - will check with next labs     Denies family history of liver disease.        Allergy and medication list reviewed and updated     PMHX:  has a past medical history of Abnormal Pap smear of cervix, Anemia, Breast disorder, Cancer, History of bilateral tubal ligation, Interstitial cystitis, Mental disorder, Osteoarthritis, PTSD (post-traumatic stress disorder), Rheumatoid arteritis, and Vulvar intraepithelial neoplasia (BREE).    PSHX:  has a past surgical history that includes Anterior vaginal repair (2010); Salpingectomy (Bilateral, 2014); Vulva surgery; Tonsillectomy (Bilateral, 12/18/2019); Tonsillectomy; Diagnostic laparoscopy (N/A, 12/14/2021); Hysteroscopy with dilation and curettage of uterus (N/A, 12/14/2021); Cystoscopy (N/A, 12/14/2021); Hysteroscopic polypectomy of uterus (N/A, 12/14/2021); Laparoscopic  "total hysterectomy (N/A, 03/22/2022); Oophorectomy (Left, 03/22/2022); Hysterectomy (N/A, 2022); egd, with closed biopsy; Esophagogastroduodenoscopy (N/A, 09/07/2023); Laparoscopic cholecystectomy (09/27/2023); and Laparoscopic cholecystectomy (N/A, 9/27/2023).    FAMILY HISTORY: Updated and reviewed in EPIC    ROS:   GENERAL: Denies fatigue  CARDIOVASCULAR: Denies edema  GI: Denies abdominal pain  SKIN: Denies rash, itching   NEURO: Denies confusion, memory loss, or mood changes    PHYSICAL EXAM:   In no acute distress; alert and oriented to person, place and time  VITALS: Ht 5' 4" (1.626 m)   Wt 81.6 kg (180 lb)   LMP 02/10/2022   BMI 30.90 kg/m²   EYES: Sclerae anicteric  GI: Soft, non-tender, non-distended. No ascites.  EXTREMITIES:  No edema.  SKIN: Warm and dry. No jaundice. No telangectasias noted. No palmar erythema.  NEURO:  No asterixis.  PSYCH:  Thought and speech pattern appropriate. Behavior normal        ASSESSMENT & PLAN        EDUCATION:  See instructions discussed with patient in Instructions section of the After Visit Summary     ASSESSMENT & PLAN:  33 y.o. female with:  1.  Hepatosplenomegaly  -- CT done 5/2024 noted hepatosplenomegaly  -- US done 6/2024 both liver and spleen were WNL  -- no elevation in liver enzymes  -- synthetic liver function WNL  -- will obtain fibroscan      Follow up in about 2 months (around 9/3/2024). with fibroscan before  No orders of the defined types were placed in this encounter.       Thank you for allowing me to participate in the care of BARBARA oWrkman, FNP-C  Nurse Practitioner  [facility]     I spent a total of 45 minutes on the day of the visit.This includes face to face time and non-face to face time preparing to see the patient (eg, review of tests), obtaining and/or reviewing separately obtained history, documenting clinical information in the electronic or other health " record, independently interpreting results and communicating results to the patient/family/caregiver, and coordinating care.         CC'ed note to:   Selvin Morocho MD

## 2024-07-03 NOTE — TELEPHONE ENCOUNTER
I Called the patient and I scheduled her follow up visit on 9/11/2024 Clinic visit same as her fibroscan at 1:30 pm

## 2024-07-05 VITALS — BODY MASS INDEX: 30.73 KG/M2 | HEIGHT: 64 IN | WEIGHT: 180 LBS

## 2024-07-05 PROBLEM — R16.2 HEPATOSPLENOMEGALY: Status: ACTIVE | Noted: 2024-05-23

## 2024-07-05 PROBLEM — E66.09 CLASS 1 OBESITY DUE TO EXCESS CALORIES WITH SERIOUS COMORBIDITY AND BODY MASS INDEX (BMI) OF 30.0 TO 30.9 IN ADULT: Status: ACTIVE | Noted: 2024-07-05

## 2024-07-05 PROBLEM — E66.811 CLASS 1 OBESITY DUE TO EXCESS CALORIES WITH SERIOUS COMORBIDITY AND BODY MASS INDEX (BMI) OF 30.0 TO 30.9 IN ADULT: Status: ACTIVE | Noted: 2024-07-05

## 2024-07-15 ENCOUNTER — PATIENT MESSAGE (OUTPATIENT)
Dept: OBSTETRICS AND GYNECOLOGY | Facility: CLINIC | Age: 34
End: 2024-07-15
Payer: MEDICAID

## 2024-07-22 PROBLEM — N64.89 PSEUDOANGIOMATOUS STROMAL HYPERPLASIA OF BREAST: Status: ACTIVE | Noted: 2024-07-22

## 2024-07-23 ENCOUNTER — OFFICE VISIT (OUTPATIENT)
Dept: OBSTETRICS AND GYNECOLOGY | Facility: CLINIC | Age: 34
End: 2024-07-23
Payer: MEDICAID

## 2024-07-23 VITALS
SYSTOLIC BLOOD PRESSURE: 126 MMHG | DIASTOLIC BLOOD PRESSURE: 70 MMHG | BODY MASS INDEX: 32.06 KG/M2 | HEIGHT: 64 IN | WEIGHT: 187.81 LBS

## 2024-07-23 DIAGNOSIS — R92.8 ABNORMAL MAMMOGRAM: Primary | ICD-10-CM

## 2024-07-23 PROCEDURE — 3074F SYST BP LT 130 MM HG: CPT | Mod: CPTII,,, | Performed by: STUDENT IN AN ORGANIZED HEALTH CARE EDUCATION/TRAINING PROGRAM

## 2024-07-23 PROCEDURE — 3078F DIAST BP <80 MM HG: CPT | Mod: CPTII,,, | Performed by: STUDENT IN AN ORGANIZED HEALTH CARE EDUCATION/TRAINING PROGRAM

## 2024-07-23 PROCEDURE — 3044F HG A1C LEVEL LT 7.0%: CPT | Mod: CPTII,,, | Performed by: STUDENT IN AN ORGANIZED HEALTH CARE EDUCATION/TRAINING PROGRAM

## 2024-07-23 PROCEDURE — 1159F MED LIST DOCD IN RCRD: CPT | Mod: CPTII,,, | Performed by: STUDENT IN AN ORGANIZED HEALTH CARE EDUCATION/TRAINING PROGRAM

## 2024-07-23 PROCEDURE — 3008F BODY MASS INDEX DOCD: CPT | Mod: CPTII,,, | Performed by: STUDENT IN AN ORGANIZED HEALTH CARE EDUCATION/TRAINING PROGRAM

## 2024-07-23 PROCEDURE — 99214 OFFICE O/P EST MOD 30 MIN: CPT | Mod: PBBFAC,PN | Performed by: STUDENT IN AN ORGANIZED HEALTH CARE EDUCATION/TRAINING PROGRAM

## 2024-07-23 PROCEDURE — 99999 PR PBB SHADOW E&M-EST. PATIENT-LVL IV: CPT | Mod: PBBFAC,,, | Performed by: STUDENT IN AN ORGANIZED HEALTH CARE EDUCATION/TRAINING PROGRAM

## 2024-07-23 PROCEDURE — 99213 OFFICE O/P EST LOW 20 MIN: CPT | Mod: S$PBB,,, | Performed by: STUDENT IN AN ORGANIZED HEALTH CARE EDUCATION/TRAINING PROGRAM
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
## 2024-08-19 PROBLEM — N64.52 BILATERAL NIPPLE DISCHARGE: Status: ACTIVE | Noted: 2024-08-19

## 2024-08-28 ENCOUNTER — OFFICE VISIT (OUTPATIENT)
Dept: RHEUMATOLOGY | Facility: CLINIC | Age: 34
End: 2024-08-28
Payer: MEDICAID

## 2024-08-28 VITALS
WEIGHT: 192.44 LBS | HEART RATE: 117 BPM | OXYGEN SATURATION: 98 % | RESPIRATION RATE: 18 BRPM | SYSTOLIC BLOOD PRESSURE: 152 MMHG | TEMPERATURE: 99 F | HEIGHT: 64 IN | BODY MASS INDEX: 32.85 KG/M2 | DIASTOLIC BLOOD PRESSURE: 95 MMHG

## 2024-08-28 DIAGNOSIS — R76.8 ANA POSITIVE: ICD-10-CM

## 2024-08-28 DIAGNOSIS — M06.9 RHEUMATOID ARTHRITIS, INVOLVING UNSPECIFIED SITE, UNSPECIFIED WHETHER RHEUMATOID FACTOR PRESENT: ICD-10-CM

## 2024-08-28 DIAGNOSIS — M32.9 SLE (SYSTEMIC LUPUS ERYTHEMATOSUS RELATED SYNDROME): Primary | ICD-10-CM

## 2024-08-28 DIAGNOSIS — I73.00 RAYNAUD'S DISEASE WITHOUT GANGRENE: ICD-10-CM

## 2024-08-28 DIAGNOSIS — R16.0 HEPATOMEGALY: ICD-10-CM

## 2024-08-28 DIAGNOSIS — M79.18 MYOFASCIAL PAIN SYNDROME: ICD-10-CM

## 2024-08-28 DIAGNOSIS — R21 RASH: ICD-10-CM

## 2024-08-28 PROCEDURE — 99214 OFFICE O/P EST MOD 30 MIN: CPT | Mod: S$PBB,,, | Performed by: STUDENT IN AN ORGANIZED HEALTH CARE EDUCATION/TRAINING PROGRAM

## 2024-08-28 PROCEDURE — 3008F BODY MASS INDEX DOCD: CPT | Mod: CPTII,,, | Performed by: STUDENT IN AN ORGANIZED HEALTH CARE EDUCATION/TRAINING PROGRAM

## 2024-08-28 PROCEDURE — 99999 PR PBB SHADOW E&M-EST. PATIENT-LVL V: CPT | Mod: PBBFAC,,, | Performed by: STUDENT IN AN ORGANIZED HEALTH CARE EDUCATION/TRAINING PROGRAM

## 2024-08-28 PROCEDURE — 3077F SYST BP >= 140 MM HG: CPT | Mod: CPTII,,, | Performed by: STUDENT IN AN ORGANIZED HEALTH CARE EDUCATION/TRAINING PROGRAM

## 2024-08-28 PROCEDURE — 1159F MED LIST DOCD IN RCRD: CPT | Mod: CPTII,,, | Performed by: STUDENT IN AN ORGANIZED HEALTH CARE EDUCATION/TRAINING PROGRAM

## 2024-08-28 PROCEDURE — 99215 OFFICE O/P EST HI 40 MIN: CPT | Mod: PBBFAC,PO | Performed by: STUDENT IN AN ORGANIZED HEALTH CARE EDUCATION/TRAINING PROGRAM

## 2024-08-28 PROCEDURE — 3044F HG A1C LEVEL LT 7.0%: CPT | Mod: CPTII,,, | Performed by: STUDENT IN AN ORGANIZED HEALTH CARE EDUCATION/TRAINING PROGRAM

## 2024-08-28 PROCEDURE — 3080F DIAST BP >= 90 MM HG: CPT | Mod: CPTII,,, | Performed by: STUDENT IN AN ORGANIZED HEALTH CARE EDUCATION/TRAINING PROGRAM

## 2024-08-28 NOTE — PROGRESS NOTES
Subjective:       Rosa Crowley is a 33 y.o. female who was referred by Selvin Morocho for evaluation of rheumatoid arthritis.  Symptoms have been present for 6 year. Onset was sudden. Symptoms include joint pain (worse at night), joint stiffness (worse in the AM), sleep disturbance due to pain, fatigue, and joint swelling in ankles, knees and hands and are severe in nature. Patient denies eye symptoms and skin nodules. Symptoms are made worse by: cold exposure, lying down, movement, overhead work, overuse, and resting.  Symptoms are helped by OTC pain medications (Ibuprofen 800mg and Tramadol- they help her sleep at night but do not take all discomfort away). Associated symptoms include arthralgia, fatigue, joint pain, morning stiffness, nausea, oral ulcers, rashes/photosensitive, and Raynaud's. Patient denies associated arthralgia, depression, fatigue, joint pain, morning stiffness, nausea, oral ulcers, rashes/photosensitive, and Raynaud's in the toes not in fingers. Overall disease activity is characterized as worse.  Limitation on activities include difficulty performing her job(works at ) and too fatigue to do anything after work - she has 2 girls and this has affected them too.   Cannot take steroids due to bipolar disease- steroid psychosis     Last appt:Patient doing Enbrel and is working with less fatigue and less joint pain. She is having photosensitive rash everyday. Also feel early satiety and bloated stomach - went to ED and CT abdomen showing hepatosplenomegaly with normal LFTs and CBC. Hematology had a virtual appointment and told patient that there is nothing that was concerning to them. Has appt with hepatology     Today: Patient here fro follow up of SLE and RA  Patient still on Enbrel but having R elbow and wrist pain and swelling   She just had breast duct removal in both breasts- she is in significant discomfort     Disease History:  Seropositive:  yes  Erosive:  unknown  will send proper work up today   Radiographic stage:  unknown will send for xrays today   Functional class:  II    Previous therapies: Patient has had a course of DMARDs in the past- methotrexate but did not improve disease  Disease activity: assessed this visit.  Functional status: assessed this visit.  Disease prognosis: good.    Previous Report(s) Reviewed: ER records, imaging reports:  lab reports, and x-ray reports        Current Outpatient Medications   Medication Sig Dispense Refill    albuterol (PROVENTIL/VENTOLIN HFA) 90 mcg/actuation inhaler Inhale 2 puffs into the lungs every 4 (four) hours as needed for Shortness of Breath or Wheezing. 18 g 3    amitriptyline (ELAVIL) 10 MG tablet Take 10 mg by mouth every evening.      buPROPion (WELLBUTRIN XL) 150 MG TB24 tablet Take 150 mg by mouth every evening.      busPIRone (BUSPAR) 15 MG tablet Take 1 tablet (15 mg total) by mouth 3 (three) times daily. (Patient taking differently: Take 15 mg by mouth as needed.) 60 tablet 1    butalbital-acetaminophen-caffeine -40 mg (FIORICET, ESGIC) -40 mg per tablet TAKE 1 TABLET BY MOUTH TWICE DAILY AS NEEDED FOR HEADACHE 30 tablet 0    clonazePAM (KLONOPIN) 0.5 MG tablet Take 0.5 mg by mouth 2 (two) times daily as needed.      dextroamphetamine-amphetamine (ADDERALL) 10 mg Tab Take 10 mg by mouth before evening meal. 400pm      dextroamphetamine-amphetamine 30 mg Tab Take 1 tablet by mouth 2 (two) times daily.      diclofenac sodium (VOLTAREN) 1 % Gel Apply to painful finger QID (Patient taking differently: as needed. Apply to painful finger QID) 100 g 2    etanercept (ENBREL SURECLICK) 50 mg/mL (1 mL) Inject 1 mL (50 mg total) into the skin once a week. 4 mL 11    gabapentin (NEURONTIN) 300 MG capsule Take 1 capsule (300 mg total) by mouth 3 (three) times daily. for 10 days (Patient taking differently: Take 300 mg by mouth as needed.) 30 capsule 0    HYDROcodone-acetaminophen (NORCO)  mg per tablet Take 1  tablet by mouth every 6 (six) hours as needed for Pain. 20 tablet 0    hydrOXYzine HCL (ATARAX) 25 MG tablet Take 1 tablet (25 mg total) by mouth 4 (four) times daily as needed for Itching. (Patient taking differently: Take 25 mg by mouth every evening.) 45 tablet 2    ondansetron (ZOFRAN-ODT) 8 MG TbDL Take 1 tablet (8 mg total) by mouth 2 (two) times daily. 20 tablet 0    oxybutynin (DITROPAN) 5 MG Tab Take 1 tablet (5 mg total) by mouth 3 (three) times daily as needed (Bladder spasm/pain). 30 tablet 5    URIBEL 118-10-40.8-36 mg Cap Take 1 capsule by mouth 4 (four) times daily as needed (bladder pain). 30 capsule 11    VRAYLAR 3 mg Cap Take 3 mg by mouth every evening.       No current facility-administered medications for this visit.     Facility-Administered Medications Ordered in Other Visits   Medication Dose Route Frequency Provider Last Rate Last Admin    diphenhydrAMINE injection 25 mg  25 mg Intravenous Q6H PRN Selvin Villarreal MD        HYDROmorphone injection 0.5 mg  0.5 mg Intravenous Q5 Min PRN Selvin Villarreal MD   0.5 mg at 12/14/21 1337    LIDOcaine-EPINEPHrine (PF) 1%-1:200,000 injection 20 mL  20 mL Subcutaneous Once Tessie Donald MD        lorazepam injection 0.25 mg  0.25 mg Intravenous Once PRN Selvin Villarreal MD        sodium chloride 0.9% bolus 250 mL  250 mL Intravenous Once Selvin Villarreal MD           Review of Systems    Answers submitted by the patient for this visit:  Rheumatology Questionnaire (Submitted on 2/20/2024)  fever: No  eye redness: No  mouth sores: No  headaches: Yes  shortness of breath: No  chest pain: No  trouble swallowing: No  diarrhea: No  constipation: No  unexpected weight change: No  genital sore: No  dysuria: No  During the last 3 days, have you had a skin rash?: Yes  Bruises or bleeds easily: No  cough: No     Objective:      LMP 02/10/2022   General: alert, appears stated age, cooperative, and fatigued   Lungs:  clear to  auscultation bilaterally    Heart:   regular rate and rhythm, S1, S2 normal, no murmur, click, rub or gallop, regularly irregular rhythm, and S1, S2 normal   Abdomen:   normal findings: no bruits heard   Swollen joints:   yes - 2,4,5 MCP on L hand and 2,3,R hand MCP   Tender joints:   yes - same as swollen    Joint Review:   ( 0=Absent, 1=Present )  Shoulder:   Left: Pain-1; Swelling-0, Right:  Pain-1; Swelling-0   Elbow:   Left: Pain-1; Swelling-1, Right:  Pain-1; Swelling-0   Wrist:   Left: Pain-0; Swelling-0, Right: Pain-0; Swelling-0   MCP I:  Left: Pain-0; Swelling-0, Right: Pain-0; Swelling-0   MCP II:  Left: Pain-1; Swelling-1, Right:  Pain-1; Swelling-1   MCP III:  Left: Pain-0; Swelling-0, Right:  Pain-1; Swelling-1   MCP IV:  Left: Pain-1; Swelling-1, Right: Pain-0; Swelling-0   MCP V:  Left: Pain-1; Swelling-1, Right: Pain-0; Swelling-0   PIP I:  Left: Pain-0; Swelling-0, Right: Pain-0; Swelling-0   PIP II:  Left: Pain-0; Swelling-0, Right: Pain-0; Swelling-0   PIP III:  Left: Pain-0; Swelling-0, Right: Pain-0; Swelling-0   PIP IV:  Left: Pain-0; Swelling-0, Right: Pain-0; Swelling-0   PIP V:  Left: Pain-0; Swelling-0, Right: Pain-0; Swelling-0   Knee:   Left: Pain-1; Swelling-1, Right:  Pain-1; Swelling-1   Ankle:   Left: Pain-1; Swelling-1, Right:  Pain-1; Swelling-1   MTP I:  Left: Pain-1; Swelling-0   MTP II:  Right:  Pain-1; Swelling-0   MTP III:  Right:  Pain-1; Swelling-0   MTP IV:  Right:  Pain-1; Swelling-0   MTP V:  Right:  Pain-1; Swelling-0       strength:  right and left difficulty making a fist    Tinels Test +:   Right:  not done  Left:  not done   Change in Nodules  no   Normal Neck ROM:  yes     Imaging  No results found for this or any previous visit.    No results found for this or any previous visit.    Results for orders placed during the hospital encounter of 01/09/24    X-Ray Chest PA And Lateral    Narrative  EXAMINATION:  XR CHEST PA AND LATERAL    CLINICAL  HISTORY:  Rheumatoid arthritis, unspecified    TECHNIQUE:  PA and lateral views of the chest were performed.    COMPARISON:  06/13/2023    FINDINGS:  There is no consolidation, effusion, or pneumothorax.  Cardiomediastinal silhouette is unremarkable.  Regional osseous structures are unremarkable.    Impression  No acute cardiopulmonary process.      Electronically signed by: Joshua Oseguera MD  Date:    01/09/2024  Time:    16:48      Lab Review    Results for orders placed or performed during the hospital encounter of 05/21/24   CBC W/ AUTO DIFFERENTIAL   Result Value Ref Range    WBC 5.01 3.90 - 12.70 K/uL    RBC 4.01 4.00 - 5.40 M/uL    Hemoglobin 12.7 12.0 - 16.0 g/dL    Hematocrit 36.2 (L) 37.0 - 48.5 %    MCV 90 82 - 98 fL    MCH 31.7 (H) 27.0 - 31.0 pg    MCHC 35.1 32.0 - 36.0 g/dL    RDW 12.6 11.5 - 14.5 %    Platelets 199 150 - 450 K/uL    MPV 9.3 9.2 - 12.9 fL    Immature Granulocytes 0.0 0.0 - 0.5 %    Gran # (ANC) 2.7 1.8 - 7.7 K/uL    Immature Grans (Abs) 0.00 0.00 - 0.04 K/uL    Lymph # 1.7 1.0 - 4.8 K/uL    Mono # 0.5 0.3 - 1.0 K/uL    Eos # 0.1 0.0 - 0.5 K/uL    Baso # 0.02 0.00 - 0.20 K/uL    nRBC 0 0 /100 WBC    Gran % 53.3 38.0 - 73.0 %    Lymph % 34.1 18.0 - 48.0 %    Mono % 9.8 4.0 - 15.0 %    Eosinophil % 2.4 0.0 - 8.0 %    Basophil % 0.4 0.0 - 1.9 %    Differential Method Automated      Results for orders placed or performed during the hospital encounter of 05/21/24   Comp. Metabolic Panel   Result Value Ref Range    Sodium 136 136 - 145 mmol/L    Potassium 3.9 3.5 - 5.1 mmol/L    Chloride 107 95 - 110 mmol/L    CO2 20 (L) 23 - 29 mmol/L    Glucose 83 70 - 110 mg/dL    BUN 7 6 - 20 mg/dL    Creatinine 0.7 0.5 - 1.4 mg/dL    Calcium 9.0 8.7 - 10.5 mg/dL    Total Protein 6.8 6.0 - 8.4 g/dL    Albumin 4.0 3.5 - 5.2 g/dL    Total Bilirubin 0.5 0.1 - 1.0 mg/dL    Alkaline Phosphatase 57 55 - 135 U/L    AST 12 10 - 40 U/L    ALT 8 (L) 10 - 44 U/L    eGFR >60 >60 mL/min/1.73 m^2    Anion Gap 9 8 - 16  mmol/L     Results for orders placed or performed in visit on 02/27/24   Sedimentation rate   Result Value Ref Range    Sed Rate 14 0 - 36 mm/Hr     Results for orders placed or performed in visit on 02/27/24   C-Reactive Protein   Result Value Ref Range    CRP 3.2 0.0 - 8.2 mg/L     No results found for this or any previous visit.  Results for orders placed or performed in visit on 07/06/23   ELAINE Screen w/Reflex   Result Value Ref Range    ELAINE Screen Positive (A) Negative <1:80     Results for orders placed or performed in visit on 01/09/24   Rheumatoid Factor   Result Value Ref Range    Rheumatoid Factor 32.0 (H) 0.0 - 15.0 IU/mL       Eye Exam:  If On Plaquenil - not on HCQ         Assessment:      Rheumatoid Arthritis worsening  Remission:  no  CHRISTAL-DI: done      Plan:   33 yr old female with pmhx of RA diagnosed 6 years ago, placed on methotrexate with no improvement:   RA   ELAINE positive   Myofascial Pain Syndrome   RP  Rash    -  Medications: at this time I advised to not take any NSAIDs until hepatosplenomegaly is resolved.  add Etodolac since Ibuprofen, Naproxen, Diclofenac and tramadol did not work to see if it will help with inflammation (she is getting  this week) without causing side effects - see Rx below, discussed risks (side effects) and benefits of medication & encouraged to read about medication  - she has photosensitive rash that could be explained by a lupus like reaction   - Will start Benlysta for SLE and arthritis   - I will change medication until we have more info about hepatosplenomegaly -- she has appt in 2 mo [facility] for fibroscan   - Labs:  CBC, CRP, Hepatitis Panel, ELAINE, Anti-Smith, Anti RNP, Anti-DS DNA, C3, C4, and sed rate and crp and TB testing - all normal   - Imaging: X-ray hands for baseline disease-- normal   - Discussion and guidance: Discussed importance of taking medication as directed, Discussed pros and cons and risks of joint injections, Recommended rest for painful and  swollen joints, Recommended regular exercise program once pain and swelling is dec, also explained why we have to wait to start immunosuppression until after we received blood work back     Follow-up In 3 months.  Call if worsening or not improving.  The patient does not have a documented glucocorticoid management plan within the past 12 months. 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  Greater than half the time spent was used for coordination of care and counseling of patient.     Radha Rodrigues M.D.  Rheumatology Department   [facility]      Answers submitted by the patient for this visit:  Rheumatology Questionnaire (Submitted on 5/21/2024)  fever: No  eye redness: No  mouth sores: No  headaches: Yes  shortness of breath: No  chest pain: No  trouble swallowing: No  diarrhea: No  constipation: Yes  unexpected weight change: No  genital sore: No  dysuria: Yes  During the last 3 days, have you had a skin rash?: No  Bruises or bleeds easily: No  cough: No      Answers submitted by the patient for this visit:  Rheumatology Questionnaire (Submitted on 8/22/2024)  fever: No  eye redness: Yes  mouth sores: Yes  headaches: Yes  shortness of breath: No  chest pain: No  trouble swallowing: Yes  diarrhea: No  constipation: No  unexpected weight change: Yes  genital sore: No  dysuria: No  During the last 3 days, have you had a skin rash?: No  Bruises or bleeds easily: No  cough: No

## 2024-08-29 PROBLEM — M32.9 SLE (SYSTEMIC LUPUS ERYTHEMATOSUS RELATED SYNDROME): Status: ACTIVE | Noted: 2024-08-29

## 2024-08-30 ENCOUNTER — PATIENT MESSAGE (OUTPATIENT)
Dept: RHEUMATOLOGY | Facility: CLINIC | Age: 34
End: 2024-08-30
Payer: MEDICAID

## 2024-08-30 RX ORDER — HYDROXYCHLOROQUINE SULFATE 200 MG/1
400 TABLET, FILM COATED ORAL DAILY
Qty: 60 TABLET | Refills: 11 | Status: SHIPPED | OUTPATIENT
Start: 2024-08-30

## 2024-09-04 ENCOUNTER — PATIENT MESSAGE (OUTPATIENT)
Dept: PRIMARY CARE CLINIC | Facility: CLINIC | Age: 34
End: 2024-09-04
Payer: MEDICAID

## 2024-09-05 ENCOUNTER — OFFICE VISIT (OUTPATIENT)
Dept: PRIMARY CARE CLINIC | Facility: CLINIC | Age: 34
End: 2024-09-05
Payer: MEDICAID

## 2024-09-05 DIAGNOSIS — F31.9 BIPOLAR 1 DISORDER: ICD-10-CM

## 2024-09-05 DIAGNOSIS — F41.9 ANXIETY: ICD-10-CM

## 2024-09-05 DIAGNOSIS — Z13.6 ENCOUNTER FOR LIPID SCREENING FOR CARDIOVASCULAR DISEASE: ICD-10-CM

## 2024-09-05 DIAGNOSIS — R63.5 WEIGHT GAIN: ICD-10-CM

## 2024-09-05 DIAGNOSIS — K59.00 CONSTIPATION, UNSPECIFIED CONSTIPATION TYPE: Primary | ICD-10-CM

## 2024-09-05 DIAGNOSIS — R10.84 GENERALIZED ABDOMINAL PAIN: ICD-10-CM

## 2024-09-05 DIAGNOSIS — Z13.220 ENCOUNTER FOR LIPID SCREENING FOR CARDIOVASCULAR DISEASE: ICD-10-CM

## 2024-09-05 DIAGNOSIS — M06.9 RHEUMATOID ARTHRITIS, INVOLVING UNSPECIFIED SITE, UNSPECIFIED WHETHER RHEUMATOID FACTOR PRESENT: ICD-10-CM

## 2024-09-05 PROCEDURE — 1160F RVW MEDS BY RX/DR IN RCRD: CPT | Mod: CPTII,95,, | Performed by: INTERNAL MEDICINE

## 2024-09-05 PROCEDURE — 1159F MED LIST DOCD IN RCRD: CPT | Mod: CPTII,95,, | Performed by: INTERNAL MEDICINE

## 2024-09-05 PROCEDURE — 99213 OFFICE O/P EST LOW 20 MIN: CPT | Mod: 95,,, | Performed by: INTERNAL MEDICINE

## 2024-09-05 PROCEDURE — 3044F HG A1C LEVEL LT 7.0%: CPT | Mod: CPTII,95,, | Performed by: INTERNAL MEDICINE

## 2024-09-05 NOTE — PROGRESS NOTES
Subjective:    The attending portion of this evaluation, treatment, and documentation was performed per Selvin Morocho MD via Telemedicine AudioVisual using the secure Box[employer] software platform with two-way audio/video. The provider was located off-site and the patient is located in the hospital. The aforementioned video software was utilized to document the relevant history and physical exam.    Patient ID: Rosa Crowley is a 33 y.o. female.    Chief Complaint: No chief complaint on file.    HPI  patient with history of bipolar disorder posttraumatic stress disorder rheumatoid arthritis lumpectomy for left breast patient visit today with complaint of constipation which has been going on for while but getting worse in the last 1 week has a use suppository and fleets enema and stool stopped the nurse still constipated she also complained of abdominal pain sometime mostly in the right lower quadrant no nausea vomiting no bloody stool  Review of Systems   Constitutional:  Negative for unexpected weight change.   Respiratory:  Negative for shortness of breath.    Cardiovascular:  Negative for chest pain.   Gastrointestinal:  Positive for abdominal pain and constipation.   Musculoskeletal:  Negative for arthralgias and back pain.   Psychiatric/Behavioral:  Positive for dysphoric mood.        Objective:      Physical Exam  Constitutional:       General: She is not in acute distress.     Appearance: Normal appearance.   HENT:      Head: Normocephalic and atraumatic.   Eyes:      Extraocular Movements: Extraocular movements intact.   Pulmonary:      Effort: Pulmonary effort is normal.   Abdominal:      General: There is no distension.      Tenderness: There is no abdominal tenderness.   Neurological:      Mental Status: She is alert and oriented to person, place, and time.   Psychiatric:         Mood and Affect: Mood normal.         Thought Content: Thought content normal.         Judgment: Judgment normal.          Assessment:       1. Constipation, unspecified constipation type    2. Weight gain    3. Anxiety    4. Generalized abdominal pain    5. Rheumatoid arthritis, involving unspecified site, unspecified whether rheumatoid factor present    6. Encounter for lipid screening for cardiovascular disease    7. Bipolar 1 disorder        Plan:       Constipation, unspecified constipation type  Comments:  Will get x-ray of the abdomen before treatment  Orders:  -     X-Ray Abdomen Flat And Erect; Future; Expected date: 09/05/2024    Weight gain  -     TSH; Future; Expected date: 09/05/2024  -     T4, Free; Future; Expected date: 09/05/2024    Anxiety  -     TSH; Future; Expected date: 09/05/2024  -     T4, Free; Future; Expected date: 09/05/2024    Generalized abdominal pain  -     CBC Auto Differential; Future; Expected date: 09/05/2024  -     Comprehensive Metabolic Panel; Future; Expected date: 09/05/2024  -     Urinalysis; Future; Expected date: 09/05/2024  -     Lipase; Future; Expected date: 09/05/2024    Rheumatoid arthritis, involving unspecified site, unspecified whether rheumatoid factor present    Encounter for lipid screening for cardiovascular disease  -     Lipid Panel; Future; Expected date: 09/05/2024    Bipolar 1 disorder  Comments:  Stable on medication follow-up with psych        Medication List with Changes/Refills   New Medications    DOCUSATE SODIUM (COLACE) 100 MG CAPSULE    Take 1 capsule (100 mg total) by mouth 2 (two) times daily.   Current Medications    ALBUTEROL (PROVENTIL/VENTOLIN HFA) 90 MCG/ACTUATION INHALER    Inhale 2 puffs into the lungs every 4 (four) hours as needed for Shortness of Breath or Wheezing.    AMITRIPTYLINE (ELAVIL) 10 MG TABLET    Take 10 mg by mouth every evening.    BELIMUMAB (BENLYSTA) 200 MG/ML ATIN    Inject 1 mL (200 mg total) into the skin every 7 days.    BUPROPION (WELLBUTRIN XL) 150 MG TB24 TABLET    Take 150 mg by mouth every evening.    BUSPIRONE (BUSPAR) 15 MG  TABLET    Take 1 tablet (15 mg total) by mouth 3 (three) times daily.    BUTALBITAL-ACETAMINOPHEN-CAFFEINE -40 MG (FIORICET, ESGIC) -40 MG PER TABLET    TAKE 1 TABLET BY MOUTH TWICE DAILY AS NEEDED FOR HEADACHE    CLONAZEPAM (KLONOPIN) 0.5 MG TABLET    Take 0.5 mg by mouth 2 (two) times daily as needed.    DEXTROAMPHETAMINE-AMPHETAMINE (ADDERALL) 10 MG TAB    Take 10 mg by mouth before evening meal. 400pm    DEXTROAMPHETAMINE-AMPHETAMINE 30 MG TAB    Take 1 tablet by mouth 2 (two) times daily.    DICLOFENAC SODIUM (VOLTAREN) 1 % GEL    Apply to painful finger QID    ETANERCEPT (ENBREL SURECLICK) 50 MG/ML (1 ML)    Inject 1 mL (50 mg total) into the skin once a week.    GABAPENTIN (NEURONTIN) 300 MG CAPSULE    Take 1 capsule (300 mg total) by mouth 3 (three) times daily. for 10 days    HYDROXYCHLOROQUINE (PLAQUENIL) 200 MG TABLET    Take 2 tablets (400 mg total) by mouth once daily.    HYDROXYZINE HCL (ATARAX) 25 MG TABLET    Take 1 tablet (25 mg total) by mouth 4 (four) times daily as needed for Itching.    ONDANSETRON (ZOFRAN-ODT) 8 MG TBDL    Take 1 tablet (8 mg total) by mouth 2 (two) times daily.    OXYBUTYNIN (DITROPAN) 5 MG TAB    Take 1 tablet (5 mg total) by mouth 3 (three) times daily as needed (Bladder spasm/pain).    URIBEL 118-10-40.8-36 MG CAP    Take 1 capsule by mouth 4 (four) times daily as needed (bladder pain).    VRAYLAR 3 MG CAP    Take 3 mg by mouth every evening.   Discontinued Medications    HYDROCODONE-ACETAMINOPHEN (NORCO)  MG PER TABLET    Take 1 tablet by mouth every 6 (six) hours as needed for Pain.

## 2024-09-06 ENCOUNTER — HOSPITAL ENCOUNTER (OUTPATIENT)
Dept: RADIOLOGY | Facility: HOSPITAL | Age: 34
Discharge: HOME | End: 2024-09-06
Attending: INTERNAL MEDICINE
Payer: MEDICAID

## 2024-09-06 ENCOUNTER — NURSE TRIAGE (OUTPATIENT)
Dept: ADMINISTRATIVE | Facility: CLINIC | Age: 34
End: 2024-09-06
Payer: MEDICAID

## 2024-09-06 ENCOUNTER — PATIENT MESSAGE (OUTPATIENT)
Dept: PRIMARY CARE CLINIC | Facility: CLINIC | Age: 34
End: 2024-09-06
Payer: MEDICAID

## 2024-09-06 ENCOUNTER — HOSPITAL ENCOUNTER (EMERGENCY)
Facility: HOSPITAL | Age: 34
Discharge: HOME | End: 2024-09-07
Attending: EMERGENCY MEDICINE
Payer: MEDICAID

## 2024-09-06 VITALS
DIASTOLIC BLOOD PRESSURE: 93 MMHG | HEIGHT: 64 IN | TEMPERATURE: 99 F | SYSTOLIC BLOOD PRESSURE: 154 MMHG | HEART RATE: 72 BPM | RESPIRATION RATE: 20 BRPM | BODY MASS INDEX: 31.92 KG/M2 | WEIGHT: 187 LBS | OXYGEN SATURATION: 100 %

## 2024-09-06 DIAGNOSIS — K59.00 CONSTIPATION, UNSPECIFIED CONSTIPATION TYPE: Primary | ICD-10-CM

## 2024-09-06 DIAGNOSIS — K59.00 CONSTIPATION, UNSPECIFIED CONSTIPATION TYPE: ICD-10-CM

## 2024-09-06 PROCEDURE — 74019 RADEX ABDOMEN 2 VIEWS: CPT | Mod: 26,,, | Performed by: RADIOLOGY

## 2024-09-06 PROCEDURE — 99282 EMERGENCY DEPT VISIT SF MDM: CPT | Mod: 25

## 2024-09-06 PROCEDURE — 25000003 PHARM REV CODE 250: Performed by: EMERGENCY MEDICINE

## 2024-09-06 PROCEDURE — 74019 RADEX ABDOMEN 2 VIEWS: CPT | Mod: TC

## 2024-09-06 RX ORDER — DOCUSATE SODIUM 100 MG/1
100 CAPSULE, LIQUID FILLED ORAL
Status: COMPLETED | OUTPATIENT
Start: 2024-09-06 | End: 2024-09-06

## 2024-09-06 RX ORDER — DOCUSATE SODIUM 100 MG/1
100 CAPSULE, LIQUID FILLED ORAL 2 TIMES DAILY
Qty: 60 CAPSULE | Refills: 0 | Status: SHIPPED | OUTPATIENT
Start: 2024-09-06

## 2024-09-06 RX ORDER — SYRING-NEEDL,DISP,INSUL,0.3 ML 29 G X1/2"
296 SYRINGE, EMPTY DISPOSABLE MISCELLANEOUS
Status: COMPLETED | OUTPATIENT
Start: 2024-09-06 | End: 2024-09-06

## 2024-09-06 RX ORDER — SYRING-NEEDL,DISP,INSUL,0.3 ML 29 G X1/2"
296 SYRINGE, EMPTY DISPOSABLE MISCELLANEOUS ONCE
Qty: 1 EACH | Refills: 0 | Status: SHIPPED | OUTPATIENT
Start: 2024-09-06 | End: 2024-09-06

## 2024-09-06 RX ORDER — ONDANSETRON 4 MG/1
4 TABLET, ORALLY DISINTEGRATING ORAL
Status: DISCONTINUED | OUTPATIENT
Start: 2024-09-06 | End: 2024-09-07 | Stop reason: HOSPADM

## 2024-09-06 RX ADMIN — MAGNESIUM CITRATE 296 ML: 1.75 LIQUID ORAL at 11:09

## 2024-09-06 RX ADMIN — DOCUSATE SODIUM 100 MG: 100 CAPSULE, LIQUID FILLED ORAL at 11:09
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
## 2024-09-07 NOTE — TELEPHONE ENCOUNTER
Pt has been having constipation for over 1 week. Pt had an xray and bloodwork done today but did not hear back with results from dr. Morocho's office. Pt still has not had a BM. Last BM over a week ago. Pt vomited once today about 30 min ago. Also c/o upper abd pain. Rates pain 7/10 currently and very tender to touch.     Dispo- see provider within 4 hours. Pt advised and VU. Asked about results, advised I am not able to discuss any test results with her, but given her current symptoms and that things appear to be getting worse, she needs to be evaluated. She VU.  Reason for Disposition   [1] Vomiting AND [2] abdomen looks much more swollen than usual    Additional Information   Negative: [1] Vomiting AND [2] contains bile (green color)   Negative: Patient sounds very sick or weak to the triager    Protocols used: Constipation-A-AH

## 2024-09-09 ENCOUNTER — PROCEDURE VISIT (OUTPATIENT)
Dept: HEPATOLOGY | Facility: CLINIC | Age: 34
End: 2024-09-09
Payer: MEDICAID

## 2024-09-09 ENCOUNTER — PATIENT MESSAGE (OUTPATIENT)
Dept: HEPATOLOGY | Facility: CLINIC | Age: 34
End: 2024-09-09

## 2024-09-09 DIAGNOSIS — R16.2 HEPATOSPLENOMEGALY: Primary | ICD-10-CM

## 2024-09-09 PROCEDURE — 91200 LIVER ELASTOGRAPHY: CPT | Mod: PBBFAC

## 2024-09-09 PROCEDURE — 91200 LIVER ELASTOGRAPHY: CPT | Mod: 26,S$PBB,,

## 2024-09-09 NOTE — PROCEDURES
FibroScan Other Locations    Date/Time: 9/9/2024 11:30 AM    Performed by: Russell Juarez NP  Authorized by: Russell Juarez NP    Diagnosis:  NAFLD    Probe:  XL    Universal Protocol: Patient's identity, procedure and site were verified, confirmatory pause was performed.  Discussed procedure including risks and potential complications.  Questions answered.  Patient verbalizes understanding and wishes to proceed with VCTE.     Procedure: After providing explanations of the procedure, patient was placed in the supine position with right arm in maximum abduction to allow optimal exposure of right lateral abdomen.  Patient was briefly assessed, Testing was performed in the mid-axillary location, 50Hz Shear Wave pulses were applied and the resulting Shear Wave and Propagation Speed detected with a 3.5 MHz ultrasonic signal, using the FibroScan probe, Skin to liver capsule distance and liver parenchyma were accessed during the entire examination with the FibroScan probe, Patient was instructed to breathe normally and to abstain from sudden movements during the procedure, allowing for random measurements of liver stiffness. At least 10 Shear Waves were produced, Individual measurements of each Shear Wave were calculated.  Patient tolerated the procedure well with no complications.  Meets discharge criteria as was dismissed.  Rates pain 0 out of 10.  Patient will follow up with ordering provider to review results.    Findings  Median liver stiffness score:  3.9  CAP Reading: dB/m:  235    IQR/med %:  27  Interpretation  Fibrosis interpretation is based on medial liver stiffness - Kilopascal (kPa).    Fibrosis Stage:  F 0-1  Steatosis interpretation is based on controlled attenuation parameter - (dB/m).    Steatosis Grade:  <S1

## 2024-09-12 ENCOUNTER — OFFICE VISIT (OUTPATIENT)
Dept: HEPATOLOGY | Facility: CLINIC | Age: 34
End: 2024-09-12
Payer: MEDICAID

## 2024-09-12 VITALS — BODY MASS INDEX: 31.24 KG/M2 | HEIGHT: 64 IN | WEIGHT: 183 LBS

## 2024-09-12 DIAGNOSIS — Z87.898 HISTORY OF HEPATOMEGALY: Primary | ICD-10-CM

## 2024-09-12 NOTE — PROGRESS NOTES
The patient location is: [place], [place]  The chief complaint leading to consultation is: Hepatosplenomegaly    Visit type: audiovisual    Face to Face time with patient: 8 minutes   20 minutes of total time spent on the encounter, which includes face to face time and non-face to face time preparing to see the patient (eg, review of tests), Obtaining and/or reviewing separately obtained history, Documenting clinical information in the electronic or other health record, Independently interpreting results (not separately reported) and communicating results to the patient/family/caregiver, or Care coordination (not separately reported).       Each patient to whom he or she provides medical services by telemedicine is:  (1) informed of the relationship between the physician and patient and the respective role of any other health care provider with respect to management of the patient; and (2) notified that he or she may decline to receive medical services by telemedicine and may withdraw from such care at any time.    Notes:      [facility]    PCP: Selvin Morocho MD     Chief Complaint:  Hepatosplenomegaly     HISTORY       HPI: This is a 33 y.o. patient with PMH noted below, presenting for follow up of hepatosplenomegaly     History of RA & raynaud's and is followed by rheum. MTX use in past, stopped 6 years ago.     Previous serologic w/u negative for viral hepatitis     Prior serologic workup:   Lab Results   Component Value Date    ANASCREEN Positive (A) 07/06/2023    HEPBSAG Negative 07/12/2019    HEPCAB Non-reactive 02/14/2024    HEPAIGM Negative 07/12/2019       Interval HPI: Presents today alone. Current weight 183#. No new complaints other than she is recovering from a lumpectomy that she had about 2 weeks ago and is struggling with some constipation due to anesthesia.      LABS & DIAGNOSTIC STUDIES      Labs done 9/2024 show normal transaminase levels   Platelets WNL, alk phos  WNL  Synthetic liver functioning WNL    Lab Results   Component Value Date    ALT 10 09/06/2024    AST 12 09/06/2024    ALKPHOS 66 09/06/2024    BILITOT 0.9 09/06/2024    ALBUMIN 4.2 09/06/2024    INR 1.0 08/23/2024     09/06/2024       Imaging:  Abd U/S done 6/2024 noted  FINDINGS:  Pancreas: The visualized portions of pancreas appear normal.     Aorta: No aneurysm.     Liver: 16 cm, normal in size. Homogeneous parenchymal echotexture. No focal lesions.     Gallbladder: The gallbladder is surgically absent.     Biliary system: 5.9 mm common bile duct.  No intrahepatic ductal dilatation.     Inferior vena cava: Normal in appearance.     Right kidney: 9.9 cm. No hydronephrosis.     Left kidney: 10.8 cm. No hydronephrosis.     Spleen: 12 cm.  Normal in size with homogeneous echotexture.     Miscellaneous: No ascites.     Impression:     Prior cholecystectomy.  Otherwise negative abdomen ultrasound    Liver fibrosis staging:  -- Fibroscan 9/2024 noted <S1, F0 (, kPa 3.9)     No alcohol consumption, see below  Social History     Substance and Sexual Activity   Alcohol Use Never       Immunity to Hep A and B - will check with next labs     Denies family history of liver disease.        Allergy and medication list reviewed and updated     PMHX:  has a past medical history of Abnormal Pap smear of cervix, Anemia, Breast disorder, Cancer, History of bilateral tubal ligation, Interstitial cystitis, Mental disorder, Osteoarthritis, PTSD (post-traumatic stress disorder), Rheumatoid arteritis, Tachycardia, and Vulvar intraepithelial neoplasia (BREE).    PSHX:  has a past surgical history that includes Anterior vaginal repair (2010); Salpingectomy (Bilateral, 2014); Vulva surgery; Tonsillectomy (Bilateral, 12/18/2019); Tonsillectomy; Diagnostic laparoscopy (N/A, 12/14/2021); Hysteroscopy with dilation and curettage of uterus (N/A, 12/14/2021); Cystoscopy (N/A, 12/14/2021); Hysteroscopic polypectomy of uterus (N/A,  "12/14/2021); Laparoscopic total hysterectomy (N/A, 03/22/2022); Oophorectomy (Left, 03/22/2022); Hysterectomy (N/A, 2022); egd, with closed biopsy; Esophagogastroduodenoscopy (N/A, 09/07/2023); Laparoscopic cholecystectomy (09/27/2023); Laparoscopic cholecystectomy (N/A, 09/27/2023); Breast biopsy (Right, 07/11/2024); biopsy, breast, with radar localization using derik  (Right, 8/27/2024); and Excisional biopsy (Left, 8/27/2024).    FAMILY HISTORY: Updated and reviewed in EPIC    ROS:   GENERAL: Denies fatigue  CARDIOVASCULAR: Denies edema  GI: Denies abdominal pain  SKIN: Denies rash, itching   NEURO: Denies confusion, memory loss, or mood changes    PHYSICAL EXAM:   In no acute distress; alert and oriented to person, place and time  VITALS: Ht 5' 4" (1.626 m)   Wt 83 kg (183 lb)   LMP 02/10/2022   BMI 31.41 kg/m²   EYES: Sclerae anicteric  GI: Soft, non-tender, non-distended. No ascites.  EXTREMITIES:  No edema.  SKIN: Warm and dry. No jaundice. No telangectasias noted. No palmar erythema.  NEURO:  No asterixis.  PSYCH:  Thought and speech pattern appropriate. Behavior normal        ASSESSMENT & PLAN        EDUCATION:  See instructions discussed with patient in Instructions section of the After Visit Summary     ASSESSMENT & PLAN:  33 y.o. female with:  1.  Hepatosplenomegaly  -- CT done 5/2024 noted hepatosplenomegaly  -- US done 6/2024 both liver and spleen were WNL  -- no elevation in liver enzymes  -- synthetic liver function WNL  -- fibroscan noted no significant steatosis and no fibrosis      Follow up if symptoms worsen or fail to improve.   No orders of the defined types were placed in this encounter.       Thank you for allowing me to participate in the care of Rosa Donya HernandezBARBARA Cramer, FNP-C  Nurse Practitioner  [facility]     I spent a total of 30 minutes on the day of the visit.This includes face to face time and non-face to " face time preparing to see the patient (eg, review of tests), obtaining and/or reviewing separately obtained history, documenting clinical information in the electronic or other health record, independently interpreting results and communicating results to the patient/family/caregiver, and coordinating care.         CC'ed note to:   Selvin Morocho MD

## 2024-09-12 NOTE — PATIENT INSTRUCTIONS
Given normal lab values, no fibrosis on fibroscan - no need for hepatology f/u. Recommend f/u yearly with PCP with liver labs and to schedule f/u appt if liver enzymes increase above normal in the future

## 2024-09-16 DIAGNOSIS — L50.9 URTICARIA: ICD-10-CM

## 2024-09-17 RX ORDER — HYDROXYZINE HYDROCHLORIDE 25 MG/1
25 TABLET, FILM COATED ORAL 4 TIMES DAILY PRN
Qty: 45 TABLET | Refills: 2 | Status: SHIPPED | OUTPATIENT
Start: 2024-09-17

## 2024-09-17 NOTE — TELEPHONE ENCOUNTER
Refill Routing Note   Medication(s) are not appropriate for processing by [facility] for the following reason(s):        Outside of protocol    [facility] action(s):  Route             Appointments  past 12m or future 3m with PCP    Date Provider   Last Visit   9/5/2024 Selvin Morocho MD   Next Visit   Visit date not found Selvin Morocho MD   ED visits in past 90 days: 1        Note composed:7:57 AM 09/17/2024

## 2024-09-19 ENCOUNTER — TELEPHONE (OUTPATIENT)
Dept: RHEUMATOLOGY | Facility: CLINIC | Age: 34
End: 2024-09-19
Payer: MEDICAID

## 2024-09-19 NOTE — TELEPHONE ENCOUNTER
----- Message from Litzy Westbrook sent at 9/19/2024 10:15 AM CDT -----  .Type:  Needs Medical Advice    Who Called: Maggy with [facility]    Would the patient rather a call back or a response via [facility]? Call back  Best Call Back Number:   Additional Information:     Maggy stated a fax was sent over for a PA for pt medication and would like a call back to confirm that the fax was received

## 2024-09-20 ENCOUNTER — PATIENT MESSAGE (OUTPATIENT)
Dept: PRIMARY CARE CLINIC | Facility: CLINIC | Age: 34
End: 2024-09-20
Payer: MEDICAID

## 2024-09-20 DIAGNOSIS — K59.00 CONSTIPATION, UNSPECIFIED CONSTIPATION TYPE: Primary | ICD-10-CM

## 2024-10-04 ENCOUNTER — PATIENT MESSAGE (OUTPATIENT)
Dept: PRIMARY CARE CLINIC | Facility: CLINIC | Age: 34
End: 2024-10-04
Payer: MEDICAID

## 2024-10-04 NOTE — TELEPHONE ENCOUNTER
Hi Mrs. Terrazas can you schedule this patient for GI or send a message to that department for them to contact this patient to schedule an appointment.

## 2024-10-06 NOTE — TELEPHONE ENCOUNTER
No care due was identified.  [facility] Embedded Care Due Messages. Reference number: [id number].   10/05/2024 10:34:23 PM CDT

## 2024-10-07 RX ORDER — ALBUTEROL SULFATE 90 UG/1
2 INHALANT RESPIRATORY (INHALATION) EVERY 4 HOURS PRN
Qty: 18 G | Refills: 11 | Status: SHIPPED | OUTPATIENT
Start: 2024-10-07

## 2024-10-07 NOTE — TELEPHONE ENCOUNTER
Refill Decision Note   Rosa Crowley  is requesting a refill authorization.  Brief Assessment and Rationale for Refill:  Approve     Medication Therapy Plan:  Last PCP Visit: 9/5/2024   Last ED Visit: 9/6/2024  for constipation leading to emesis. ED notes reviewed      Extended chart review required: Yes   Comments:     Note composed:10:29 AM 10/07/2024

## 2024-10-11 ENCOUNTER — NURSE TRIAGE (OUTPATIENT)
Dept: ADMINISTRATIVE | Facility: CLINIC | Age: 34
End: 2024-10-11
Payer: MEDICAID

## 2024-10-11 ENCOUNTER — HOSPITAL ENCOUNTER (EMERGENCY)
Facility: HOSPITAL | Age: 34
Discharge: HOME | End: 2024-10-12
Attending: EMERGENCY MEDICINE
Payer: MEDICAID

## 2024-10-11 ENCOUNTER — OFFICE VISIT (OUTPATIENT)
Dept: URGENT CARE | Facility: CLINIC | Age: 34
End: 2024-10-11
Payer: MEDICAID

## 2024-10-11 VITALS
HEIGHT: 64 IN | OXYGEN SATURATION: 99 % | HEART RATE: 107 BPM | WEIGHT: 183 LBS | SYSTOLIC BLOOD PRESSURE: 136 MMHG | DIASTOLIC BLOOD PRESSURE: 96 MMHG | BODY MASS INDEX: 31.24 KG/M2 | TEMPERATURE: 99 F | RESPIRATION RATE: 16 BRPM

## 2024-10-11 DIAGNOSIS — K59.00 CONSTIPATION, UNSPECIFIED CONSTIPATION TYPE: Primary | ICD-10-CM

## 2024-10-11 DIAGNOSIS — K59.00 CONSTIPATION, UNSPECIFIED CONSTIPATION TYPE: ICD-10-CM

## 2024-10-11 DIAGNOSIS — R10.9 ABDOMINAL PAIN, UNSPECIFIED ABDOMINAL LOCATION: ICD-10-CM

## 2024-10-11 DIAGNOSIS — R10.84 GENERALIZED ABDOMINAL PAIN: Primary | ICD-10-CM

## 2024-10-11 LAB
ALBUMIN SERPL BCP-MCNC: 4.4 G/DL (ref 3.5–5.2)
ALP SERPL-CCNC: 66 U/L (ref 55–135)
ALT SERPL W/O P-5'-P-CCNC: 8 U/L (ref 10–44)
ANION GAP SERPL CALC-SCNC: 7 MMOL/L (ref 8–16)
AST SERPL-CCNC: 10 U/L (ref 10–40)
BASOPHILS # BLD AUTO: 0.03 K/UL (ref 0–0.2)
BASOPHILS NFR BLD: 0.6 % (ref 0–1.9)
BILIRUB SERPL-MCNC: 0.7 MG/DL (ref 0.1–1)
BUN SERPL-MCNC: 8 MG/DL (ref 6–20)
CALCIUM SERPL-MCNC: 8.6 MG/DL (ref 8.7–10.5)
CHLORIDE SERPL-SCNC: 108 MMOL/L (ref 95–110)
CO2 SERPL-SCNC: 23 MMOL/L (ref 23–29)
CREAT SERPL-MCNC: 0.7 MG/DL (ref 0.5–1.4)
DIFFERENTIAL METHOD BLD: ABNORMAL
EOSINOPHIL # BLD AUTO: 0.2 K/UL (ref 0–0.5)
EOSINOPHIL NFR BLD: 3.6 % (ref 0–8)
ERYTHROCYTE [DISTWIDTH] IN BLOOD BY AUTOMATED COUNT: 13.2 % (ref 11.5–14.5)
EST. GFR  (NO RACE VARIABLE): >60 ML/MIN/1.73 M^2
GLUCOSE SERPL-MCNC: 93 MG/DL (ref 70–110)
HCT VFR BLD AUTO: 32.8 % (ref 37–48.5)
HGB BLD-MCNC: 11.4 G/DL (ref 12–16)
IMM GRANULOCYTES # BLD AUTO: 0.02 K/UL (ref 0–0.04)
IMM GRANULOCYTES NFR BLD AUTO: 0.4 % (ref 0–0.5)
LIPASE SERPL-CCNC: 27 U/L (ref 4–60)
LYMPHOCYTES # BLD AUTO: 1.4 K/UL (ref 1–4.8)
LYMPHOCYTES NFR BLD: 28.3 % (ref 18–48)
MCH RBC QN AUTO: 31.1 PG (ref 27–31)
MCHC RBC AUTO-ENTMCNC: 34.8 G/DL (ref 32–36)
MCV RBC AUTO: 90 FL (ref 82–98)
MONOCYTES # BLD AUTO: 0.5 K/UL (ref 0.3–1)
MONOCYTES NFR BLD: 9 % (ref 4–15)
NEUTROPHILS # BLD AUTO: 2.9 K/UL (ref 1.8–7.7)
NEUTROPHILS NFR BLD: 58.1 % (ref 38–73)
NRBC BLD-RTO: 0 /100 WBC
PLATELET # BLD AUTO: 221 K/UL (ref 150–450)
PMV BLD AUTO: 9.1 FL (ref 9.2–12.9)
POTASSIUM SERPL-SCNC: 3.7 MMOL/L (ref 3.5–5.1)
PROT SERPL-MCNC: 6.8 G/DL (ref 6–8.4)
RBC # BLD AUTO: 3.66 M/UL (ref 4–5.4)
SODIUM SERPL-SCNC: 138 MMOL/L (ref 136–145)
WBC # BLD AUTO: 5.02 K/UL (ref 3.9–12.7)

## 2024-10-11 PROCEDURE — 25500020 PHARM REV CODE 255: Performed by: EMERGENCY MEDICINE

## 2024-10-11 PROCEDURE — 85025 COMPLETE CBC W/AUTO DIFF WBC: CPT | Performed by: EMERGENCY MEDICINE

## 2024-10-11 PROCEDURE — 96361 HYDRATE IV INFUSION ADD-ON: CPT

## 2024-10-11 PROCEDURE — 63600175 PHARM REV CODE 636 W HCPCS: Performed by: EMERGENCY MEDICINE

## 2024-10-11 PROCEDURE — 96375 TX/PRO/DX INJ NEW DRUG ADDON: CPT

## 2024-10-11 PROCEDURE — 99284 EMERGENCY DEPT VISIT MOD MDM: CPT | Mod: 25

## 2024-10-11 PROCEDURE — 80053 COMPREHEN METABOLIC PANEL: CPT | Performed by: EMERGENCY MEDICINE

## 2024-10-11 PROCEDURE — 96374 THER/PROPH/DIAG INJ IV PUSH: CPT

## 2024-10-11 PROCEDURE — 83690 ASSAY OF LIPASE: CPT | Performed by: EMERGENCY MEDICINE

## 2024-10-11 RX ORDER — HYOSCYAMINE SULFATE 0.5 MG/ML
0.25 INJECTION, SOLUTION SUBCUTANEOUS
Status: COMPLETED | OUTPATIENT
Start: 2024-10-11 | End: 2024-10-11

## 2024-10-11 RX ORDER — HYDROMORPHONE HYDROCHLORIDE 1 MG/ML
0.5 INJECTION, SOLUTION INTRAMUSCULAR; INTRAVENOUS; SUBCUTANEOUS
Status: COMPLETED | OUTPATIENT
Start: 2024-10-11 | End: 2024-10-11

## 2024-10-11 RX ORDER — ONDANSETRON HYDROCHLORIDE 2 MG/ML
8 INJECTION, SOLUTION INTRAVENOUS
Status: DISCONTINUED | OUTPATIENT
Start: 2024-10-11 | End: 2024-10-12 | Stop reason: HOSPADM

## 2024-10-11 RX ORDER — PANTOPRAZOLE SODIUM 40 MG/10ML
40 INJECTION, POWDER, LYOPHILIZED, FOR SOLUTION INTRAVENOUS
Status: COMPLETED | OUTPATIENT
Start: 2024-10-11 | End: 2024-10-11

## 2024-10-11 RX ADMIN — SODIUM CHLORIDE, POTASSIUM CHLORIDE, SODIUM LACTATE AND CALCIUM CHLORIDE 1000 ML: 600; 310; 30; 20 INJECTION, SOLUTION INTRAVENOUS at 09:10

## 2024-10-11 RX ADMIN — HYDROMORPHONE HYDROCHLORIDE 0.5 MG: 0.5 INJECTION, SOLUTION INTRAMUSCULAR; INTRAVENOUS; SUBCUTANEOUS at 09:10

## 2024-10-11 RX ADMIN — HYOSCYAMINE SULFATE 0.25 MG: 0.5 INJECTION, SOLUTION SUBCUTANEOUS at 09:10

## 2024-10-11 RX ADMIN — PANTOPRAZOLE SODIUM 40 MG: 40 INJECTION, POWDER, LYOPHILIZED, FOR SOLUTION INTRAVENOUS at 09:10

## 2024-10-11 RX ADMIN — IOHEXOL 100 ML: 350 INJECTION, SOLUTION INTRAVENOUS at 10:10

## 2024-10-11 NOTE — PROGRESS NOTES
"Subjective:      Patient ID: Rosa Crowley is a 33 y.o. female.    Vitals:  height is 5' 4" (1.626 m) and weight is 83 kg (183 lb). Her oral temperature is 98.7 °F (37.1 °C). Her blood pressure is 136/96 (abnormal) and her pulse is 107. Her respiration is 16 and oxygen saturation is 99%.     Chief Complaint: Abdominal Pain    Patient is a 31-year-old female with a past medical history anxiety, depression, PTSD, Raynaud's, RA, lupus, anemia, and valvular cancer who presents to clinic for evaluation of abdominal pain.  Patient reports symptoms for multiple months.  Patient reports has a appointment with a GI and 2 months.  Patient states symptoms can not wait that long.  Patient states that she hurts all throughout her abdomen.  Patient states sometimes dull and cramping sensation sometimes sharp and stabbing.  Patient states that she has constipation.  Patient states has not had a bowel movement in many weeks.  Patient states may have had a small 1 last week however does not believes so.  Patient states has been in the emergency department multiple times.  Patient states lactulose, magnesium citrate, MiraLax, milk of Mag and other medicines do not help.  Patient states that pain causes her to have decreased appetite.  Patient states has intermittent nausea.  Patient states has sweats, dizziness and shortness breath because of constipation and abdominal pain.  Patient states does not want to eat because she becomes short of breath.  Patient states that she has not noticed any urinary symptoms, headaches, body aches, chest pain or palpitations, cough, or rectal bleeding.  Patient does report that she has mucus in her stool.    Abdominal Pain  This is a recurrent problem. Episode onset: x 2 months. The problem has been gradually worsening. Associated symptoms include constipation and nausea. Pertinent negatives include no diarrhea, dysuria, frequency, headaches or vomiting.       Constitution: Positive " for sweating and fatigue.   HENT: Negative.     Neck: neck negative.   Cardiovascular: Negative.  Negative for chest pain and palpitations.   Eyes: Negative.    Respiratory:  Positive for shortness of breath. Negative for chest tightness and cough.    Gastrointestinal:  Positive for abdominal pain, nausea and constipation. Negative for vomiting and diarrhea.   Endocrine: negative.   Genitourinary: Negative.  Negative for dysuria, frequency and urgency.   Musculoskeletal: Negative.    Skin: Negative.  Negative for color change, pale, rash and erythema.   Allergic/Immunologic: Negative.    Neurological:  Positive for dizziness. Negative for light-headedness, passing out, headaches, disorientation and altered mental status.   Hematologic/Lymphatic: Negative.    Psychiatric/Behavioral: Negative.  Negative for altered mental status, disorientation and confusion.       Objective:     Physical Exam   Constitutional: She is oriented to person, place, and time. She appears well-developed. She is cooperative.  Non-toxic appearance. She appears ill (Uncomfortable appearing). No distress.   HENT:   Head: Normocephalic and atraumatic.   Ears:   Right Ear: Hearing and external ear normal.   Left Ear: Hearing and external ear normal.   Nose: Nose normal. No mucosal edema or nasal deformity. No epistaxis. Right sinus exhibits no maxillary sinus tenderness and no frontal sinus tenderness. Left sinus exhibits no maxillary sinus tenderness and no frontal sinus tenderness.   Mouth/Throat: Uvula is midline, oropharynx is clear and moist and mucous membranes are normal. No trismus in the jaw. Normal dentition. No uvula swelling.   Eyes: Conjunctivae and lids are normal. Pupils are equal, round, and reactive to light.   Neck: Trachea normal and phonation normal. Neck supple.   Cardiovascular: Regular rhythm, normal heart sounds and normal pulses. Tachycardia present.   Pulmonary/Chest: Effort normal and breath sounds normal. No  respiratory distress. She has no wheezes. She has no rhonchi. She has no rales.   Abdominal: Normal appearance and bowel sounds are normal. She exhibits no distension. Soft. There is abdominal tenderness (Diffuse tenderness with palpation).   Musculoskeletal: Normal range of motion.         General: Normal range of motion.   Neurological: She is alert and oriented to person, place, and time. She exhibits normal muscle tone.   Skin: Skin is warm, dry, intact, not diaphoretic, not pale and no rash. Capillary refill takes less than 2 seconds. No erythema   Psychiatric: Her speech is normal and behavior is normal. Judgment and thought content normal.   Nursing note and vitals reviewed.      Assessment:     1. Generalized abdominal pain    2. Constipation, unspecified constipation type        Plan:       Generalized abdominal pain  -     POCT Urinalysis, Dipstick, Manual, W/O Scope  -     XR KUB; Future; Expected date: 10/11/2024    Constipation, unspecified constipation type  -     POCT Urinalysis, Dipstick, Manual, W/O Scope  -     XR KUB; Future; Expected date: 10/11/2024                Labs: UA: Patient states unable to provide sample as she urinated before arrival to clinic.  Patient states also with trying to strain to urinate this causes increased abdominal pain and discomfort.  KUB: Interpreted by myself with evidence of constipation.  Appearance surgical clip for cholecystectomy.  History and physical discussed with patient.  With associated symptoms and patient is concerned with small-bowel obstruction recommend patient present to emergency department for further evaluation and treatment.  Patient was offered outpatient treatment however states multiple previously prescribed an over-the-counter medications do not work for her.  Patient states can not wait until December to follow-up with GI.  To assist with treatment recommend patient present to emergency department for treatment and further evaluation.     Close follow-up with PCP once discharged.    Follow-up with GI once scheduled; call to see if sooner appointment available.    Patient advised to present straight emergency department not to stop or have anything to eat or drink prior to arrival.  Patient in agreement with plan of care.  Patient offered EMS however refused.  Risk versus benefits including possibility of death explained to patient.    DISCLAIMER: Please note that my documentation in this Electronic Healthcare Record was produced using speech recognition software and therefore may contain errors related to that software system.These could include grammar, punctuation and spelling errors or the inclusion/exclusion of phrases that were not intended. Garbled syntax, mangled pronouns, and other bizarre constructions may be attributed to that software system.

## 2024-10-11 NOTE — TELEPHONE ENCOUNTER
Pt c/o being constipated. Pt now c/o nausea, headache, tenderness to abdomen and can not remember last BM. Pt states that she believes it has been longer than 4 day and has blood/mucus when wiping. Advised to be seen today per protocol. Unable to schedule appt per protocol. [facility] offered and accepted. Pt also states that when she takes a laxative it still takes 3-4 days to have a BM.  Encounter routed to provider.   Reason for Disposition   Constant abdominal pain lasting > 2 hours    Additional Information   Negative: Vomiting bile (green color)   Negative: Patient sounds very sick or weak to the triager    Protocols used: Constipation-A-OH

## 2024-10-11 NOTE — TELEPHONE ENCOUNTER
She is very constipated.  She has an office visit with GI in December.  When  she does have a bm , it is mucous.  Please advise.

## 2024-10-12 VITALS
DIASTOLIC BLOOD PRESSURE: 78 MMHG | RESPIRATION RATE: 16 BRPM | TEMPERATURE: 98 F | BODY MASS INDEX: 30.73 KG/M2 | WEIGHT: 180 LBS | SYSTOLIC BLOOD PRESSURE: 122 MMHG | HEIGHT: 64 IN | HEART RATE: 85 BPM | OXYGEN SATURATION: 100 %

## 2024-10-12 LAB
BILIRUB UR QL STRIP: NEGATIVE
CLARITY UR: CLEAR
COLOR UR: YELLOW
GLUCOSE UR QL STRIP: NEGATIVE
HGB UR QL STRIP: NEGATIVE
KETONES UR QL STRIP: NEGATIVE
LEUKOCYTE ESTERASE UR QL STRIP: NEGATIVE
NITRITE UR QL STRIP: NEGATIVE
PH UR STRIP: 7 [PH] (ref 5–8)
PROT UR QL STRIP: NEGATIVE
SP GR UR STRIP: >1.03 (ref 1–1.03)
URN SPEC COLLECT METH UR: ABNORMAL
UROBILINOGEN UR STRIP-ACNC: NEGATIVE EU/DL

## 2024-10-12 PROCEDURE — 25000003 PHARM REV CODE 250: Performed by: EMERGENCY MEDICINE

## 2024-10-12 PROCEDURE — 81003 URINALYSIS AUTO W/O SCOPE: CPT | Performed by: EMERGENCY MEDICINE

## 2024-10-12 RX ORDER — SYRING-NEEDL,DISP,INSUL,0.3 ML 29 G X1/2"
296 SYRINGE, EMPTY DISPOSABLE MISCELLANEOUS ONCE
Qty: 296 ML | Refills: 0 | Status: SHIPPED | OUTPATIENT
Start: 2024-10-12 | End: 2024-10-12

## 2024-10-12 RX ORDER — ACETAMINOPHEN 500 MG
1000 TABLET ORAL
Status: COMPLETED | OUTPATIENT
Start: 2024-10-12 | End: 2024-10-12

## 2024-10-12 RX ORDER — HYOSCYAMINE SULFATE 0.125 MG
125 TABLET ORAL EVERY 4 HOURS PRN
Qty: 30 TABLET | Refills: 0 | Status: SHIPPED | OUTPATIENT
Start: 2024-10-12

## 2024-10-12 RX ORDER — DOCUSATE SODIUM 100 MG/1
100 CAPSULE, LIQUID FILLED ORAL 3 TIMES DAILY PRN
Qty: 60 CAPSULE | Refills: 0 | Status: SHIPPED | OUTPATIENT
Start: 2024-10-12

## 2024-10-12 RX ADMIN — ACETAMINOPHEN 1000 MG: 500 TABLET, FILM COATED ORAL at 12:10
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
## 2024-10-12 NOTE — DISCHARGE INSTRUCTIONS
Drink lots of fluids.  Rest.  Return to emergency department for worsening symptoms or any problems.  Follow-up with gastroenterologist as scheduled.

## 2024-10-14 ENCOUNTER — PATIENT MESSAGE (OUTPATIENT)
Dept: PRIMARY CARE CLINIC | Facility: CLINIC | Age: 34
End: 2024-10-14
Payer: MEDICAID

## 2024-10-14 ENCOUNTER — TELEPHONE (OUTPATIENT)
Dept: CARDIOLOGY | Facility: CLINIC | Age: 34
End: 2024-10-14
Payer: MEDICAID

## 2024-10-14 ENCOUNTER — PATIENT MESSAGE (OUTPATIENT)
Dept: RHEUMATOLOGY | Facility: CLINIC | Age: 34
End: 2024-10-14
Payer: MEDICAID

## 2024-10-14 NOTE — TELEPHONE ENCOUNTER
Gave patient appt with Indy Brock for 10/15/2024        ----- Message from Med Assistant Romy sent at 10/14/2024  9:45 AM CDT -----  Regarding: FW: Appt  Contact: pt [phone]    ----- Message -----  From: Joan Werner  Sent: 10/14/2024   9:41 AM CDT  To: [facility] Staff  Subject: Appt                                             Pt is requesting call back to schedule sooner appt, pt is currently scheduled for 12/18, pt is having constipation, please call [phone]

## 2024-10-15 ENCOUNTER — OFFICE VISIT (OUTPATIENT)
Dept: GASTROENTEROLOGY | Facility: CLINIC | Age: 34
End: 2024-10-15
Payer: MEDICAID

## 2024-10-15 ENCOUNTER — PATIENT MESSAGE (OUTPATIENT)
Dept: GASTROENTEROLOGY | Facility: CLINIC | Age: 34
End: 2024-10-15
Payer: MEDICAID

## 2024-10-15 ENCOUNTER — PATIENT MESSAGE (OUTPATIENT)
Dept: GASTROENTEROLOGY | Facility: CLINIC | Age: 34
End: 2024-10-15

## 2024-10-15 DIAGNOSIS — R10.84 GENERALIZED ABDOMINAL PAIN: ICD-10-CM

## 2024-10-15 DIAGNOSIS — K59.04 CHRONIC IDIOPATHIC CONSTIPATION: Primary | ICD-10-CM

## 2024-10-15 PROCEDURE — 3044F HG A1C LEVEL LT 7.0%: CPT | Mod: CPTII,95,, | Performed by: NURSE PRACTITIONER

## 2024-10-15 PROCEDURE — 99214 OFFICE O/P EST MOD 30 MIN: CPT | Mod: 95,,, | Performed by: NURSE PRACTITIONER

## 2024-10-15 PROCEDURE — 1160F RVW MEDS BY RX/DR IN RCRD: CPT | Mod: CPTII,95,, | Performed by: NURSE PRACTITIONER

## 2024-10-15 PROCEDURE — 1159F MED LIST DOCD IN RCRD: CPT | Mod: CPTII,95,, | Performed by: NURSE PRACTITIONER

## 2024-10-15 RX ORDER — SODIUM, POTASSIUM,MAG SULFATES 17.5-3.13G
1 SOLUTION, RECONSTITUTED, ORAL ORAL DAILY
Qty: 1 KIT | Refills: 0 | Status: SHIPPED | OUTPATIENT
Start: 2024-10-15

## 2024-10-15 NOTE — PROGRESS NOTES
GASTROENTEROLOGY CLINIC NOTE    The patient location is: [place]  The chief complaint leading to consultation is: Constipation    Visit type: audiovisual    Face to Face time with patient: 18:23  30 minutes of total time spent on the encounter, which includes face to face time and non-face to face time preparing to see the patient (eg, review of tests), Obtaining and/or reviewing separately obtained history, Documenting clinical information in the electronic or other health record, Independently interpreting results (not separately reported) and communicating results to the patient/family/caregiver, or Care coordination (not separately reported).     Each patient to whom he or she provides medical services by telemedicine is:  (1) informed of the relationship between the physician and patient and the respective role of any other health care provider with respect to management of the patient; and (2) notified that he or she may decline to receive medical services by telemedicine and may withdraw from such care at any time.        Chief Complaint: The primary encounter diagnosis was Chronic idiopathic constipation. A diagnosis of Generalized abdominal pain was also pertinent to this visit.  Referring provider/PCP: Selvin Morocho MD HPI Mindy Marie Sherwood Brodmyer is a 33 y.o. female who is a new patient to me who presents via telemedicine encounter for constipation.   Has hx of constipation but it has been worsening the last two months.   Constipation accompanied by nausea and decreased appetite   Recently went to urgent care who advised she go to ER. Abdominal xray and CT significant for stool burden.     Constipation     How Long:    Worsening constipation over last two months     Frequency of Bowel Movements:   Has had 4-5 bowel movements since breast biopsy on  8/27   Straining:   yes   Complete Evacuation:   no   Hematochezia:   blood mixed in with mucus    Abdominal Pain:  Right sided abdominal  pain with abdominal distension   Unexplained Weight Loss/Change in Bowel Habits:   no   Water Intake:   good   Daily Fiber Supplement:  Metamucil started last one week ago     Treatments: Mg citrate, enemas, laxatives, suppositories, milk of magnesia  Takes about 3-4 days to produce bowel movement but when it does it is small     GLP-1s: No  NSAIDs: No  Anticoagulation or Antiplatelet: No    History of H.pylori:  H.pylori Treatment:  Prior Upper Endoscopy: 2023 with Dr. Oneill  Impression:            - Normal esophagus.                          - Erythematous mucosa in the antrum. Biopsied.                          - Normal examined duodenum.     Pathology:  STOMACH, BIOPSY:   - Gastric mucosa (antral and oxyntic-types) with mild chronic inflammation   - No evidence of Helicobacter-like organisms (an H. pylori immunohistochemical study is negative)     Prior Colonoscopy:   About 15 years ago d/t hematochezia and constipation  Unsure of findings    Family h/o Colon Cancer: No  Family h/o Crohn's Disease or Ulcerative Colitis: No  Family h/o Celiac Sprue: No  Abdominal Surgeries: casandra      Review of Systems   Constitutional:  Negative for weight loss.   HENT:  Negative for sore throat.    Eyes:  Negative for blurred vision.   Respiratory:  Negative for cough.    Cardiovascular:  Negative for chest pain.   Gastrointestinal:  Positive for abdominal pain, constipation and nausea. Negative for blood in stool, diarrhea, heartburn, melena and vomiting.   Genitourinary:  Negative for dysuria.   Musculoskeletal:  Negative for myalgias.   Skin:  Negative for rash.   Neurological:  Negative for headaches.   Endo/Heme/Allergies:  Negative for environmental allergies.   Psychiatric/Behavioral:  Negative for suicidal ideas. The patient is not nervous/anxious.        Past Medical History: has a past medical history of Abnormal Pap smear of cervix, Anemia, Breast disorder, Cancer, History of bilateral tubal ligation, Interstitial  cystitis, Mental disorder, Osteoarthritis, PTSD (post-traumatic stress disorder), Rheumatoid arteritis, Tachycardia, and Vulvar intraepithelial neoplasia (BREE).    Past Surgical History: has a past surgical history that includes Anterior vaginal repair (2010); Salpingectomy (Bilateral, 2014); Vulva surgery; Tonsillectomy (Bilateral, 12/18/2019); Tonsillectomy; Diagnostic laparoscopy (N/A, 12/14/2021); Hysteroscopy with dilation and curettage of uterus (N/A, 12/14/2021); Cystoscopy (N/A, 12/14/2021); Hysteroscopic polypectomy of uterus (N/A, 12/14/2021); Laparoscopic total hysterectomy (N/A, 03/22/2022); Oophorectomy (Left, 03/22/2022); Hysterectomy (N/A, 2022); egd, with closed biopsy; Esophagogastroduodenoscopy (N/A, 09/07/2023); Laparoscopic cholecystectomy (09/27/2023); Laparoscopic cholecystectomy (N/A, 09/27/2023); Breast biopsy (Right, 07/11/2024); biopsy, breast, with radar localization using derik  (Right, 8/27/2024); and Excisional biopsy (Left, 8/27/2024).    Family History:family history includes Bladder Cancer in her mother; Breast cancer in her paternal grandmother and another family member; Cervical cancer in her mother; Lymphoma in her maternal grandmother; Multiple myeloma in her maternal aunt; Other cancer in her brother; Ovarian cancer in her mother; Throat cancer in her father and paternal grandfather; Tongue cancer in her paternal grandmother; Uterine cancer in her mother.    Allergies:   Review of patient's allergies indicates:   Allergen Reactions    Amoxicillin Hives    Corticosteroids (glucocorticoids) Other (See Comments)    Azithromycin Rash     Yeast infection       Social History: reports that she has never smoked. She has never used smokeless tobacco. She reports that she does not drink alcohol and does not use drugs.    Home medications:   Current Outpatient Medications on File Prior to Visit   Medication Sig Dispense Refill    albuterol (PROVENTIL/VENTOLIN HFA) 90 mcg/actuation  inhaler Inhale 2 puffs into the lungs every 4 (four) hours as needed for Shortness of Breath or Wheezing. 18 g 11    amitriptyline (ELAVIL) 10 MG tablet Take 10 mg by mouth every evening.      belimumab (BENLYSTA) 200 mg/mL AtIn Inject 1 mL (200 mg total) into the skin every 7 days. 4 mL 11    buPROPion (WELLBUTRIN XL) 150 MG TB24 tablet Take 150 mg by mouth every evening.      busPIRone (BUSPAR) 15 MG tablet Take 1 tablet (15 mg total) by mouth 3 (three) times daily. (Patient taking differently: Take 15 mg by mouth as needed.) 60 tablet 1    butalbital-acetaminophen-caffeine -40 mg (FIORICET, ESGIC) -40 mg per tablet TAKE 1 TABLET BY MOUTH TWICE DAILY AS NEEDED FOR HEADACHE 30 tablet 0    clonazePAM (KLONOPIN) 0.5 MG tablet Take 0.5 mg by mouth 2 (two) times daily as needed.      dextroamphetamine-amphetamine (ADDERALL) 10 mg Tab Take 10 mg by mouth before evening meal. 400pm      dextroamphetamine-amphetamine 30 mg Tab Take 1 tablet by mouth 2 (two) times daily.      diclofenac sodium (VOLTAREN) 1 % Gel Apply to painful finger QID (Patient taking differently: as needed. Apply to painful finger QID) 100 g 2    docusate sodium (COLACE) 100 MG capsule Take 1 capsule (100 mg total) by mouth 3 (three) times daily as needed. 60 capsule 0    etanercept (ENBREL SURECLICK) 50 mg/mL (1 mL) Inject 1 mL (50 mg total) into the skin once a week. 4 mL 11    gabapentin (NEURONTIN) 300 MG capsule Take 1 capsule (300 mg total) by mouth 3 (three) times daily. for 10 days (Patient taking differently: Take 300 mg by mouth as needed.) 30 capsule 0    hydroxychloroquine (PLAQUENIL) 200 mg tablet Take 2 tablets (400 mg total) by mouth once daily. 60 tablet 11    hydrOXYzine HCL (ATARAX) 25 MG tablet Take 1 tablet (25 mg total) by mouth 4 (four) times daily as needed for Itching. 45 tablet 2    hyoscyamine (ANASPAZ,LEVSIN) 0.125 mg Tab Take 1 tablet (125 mcg total) by mouth every 4 (four) hours as needed. 30 tablet 0     "ondansetron (ZOFRAN-ODT) 8 MG TbDL Take 1 tablet (8 mg total) by mouth 2 (two) times daily. 20 tablet 0    oxybutynin (DITROPAN) 5 MG Tab Take 1 tablet (5 mg total) by mouth 3 (three) times daily as needed (Bladder spasm/pain). 30 tablet 5    URIBEL 118-10-40.8-36 mg Cap Take 1 capsule by mouth 4 (four) times daily as needed (bladder pain). 30 capsule 11    VRAYLAR 3 mg Cap Take 3 mg by mouth every evening.       Current Facility-Administered Medications on File Prior to Visit   Medication Dose Route Frequency Provider Last Rate Last Admin    diphenhydrAMINE injection 25 mg  25 mg Intravenous Q6H PRN Selvin Villarreal MD        HYDROmorphone injection 0.5 mg  0.5 mg Intravenous Q5 Min PRN Selvin Villarreal MD   0.5 mg at 12/14/21 1337    LIDOcaine-EPINEPHrine (PF) 1%-1:200,000 injection 20 mL  20 mL Subcutaneous Once Tessie Donald MD        lorazepam injection 0.25 mg  0.25 mg Intravenous Once PRN Selvin Villarreal MD        sodium chloride 0.9% bolus 250 mL  250 mL Intravenous Once Selvin Villarreal MD           Vital signs:  LMP 02/10/2022     Physical Exam  Constitutional:       Appearance: Normal appearance. She is not ill-appearing.      Comments: Limited Physical Exam d/t Telemedicine Encounter   HENT:      Head: Normocephalic.   Pulmonary:      Effort: Pulmonary effort is normal. No respiratory distress.   Neurological:      Mental Status: She is alert and oriented to person, place, and time.   Psychiatric:         Mood and Affect: Mood normal.         Behavior: Behavior normal.         Thought Content: Thought content normal.         Judgment: Judgment normal.         Routine labs:  Lab Results   Component Value Date    WBC 5.02 10/11/2024    HGB 11.4 (L) 10/11/2024    HCT 32.8 (L) 10/11/2024    MCV 90 10/11/2024     10/11/2024     Lab Results   Component Value Date    INR 1.0 08/23/2024     No results found for: "IRON", "FERRITIN", "TIBC", "FESATURATED"  Lab Results " "  Component Value Date     10/11/2024    K 3.7 10/11/2024     10/11/2024    CO2 23 10/11/2024    BUN 8 10/11/2024    CREATININE 0.7 10/11/2024     Lab Results   Component Value Date    ALBUMIN 4.4 10/11/2024    ALT 8 (L) 10/11/2024    AST 10 10/11/2024    ALKPHOS 66 10/11/2024    BILITOT 0.7 10/11/2024     No results found for: "GLUCOSE"  Lab Results   Component Value Date    TSH 0.505 09/06/2024     Lab Results   Component Value Date    CALCIUM 8.6 (L) 10/11/2024    PHOS 3.8 02/07/2017       Imaging:  CT Abdomen Pelvis With IV Contrast NO Oral Contrast  Narrative: EXAMINATION:  CT ABDOMEN PELVIS WITH IV CONTRAST    CLINICAL HISTORY:  Bowel obstruction suspected;    TECHNIQUE:  Low dose axial images, sagittal and coronal reformations were obtained from the lung bases to the pubic symphysis following the IV administration of 100 mL of Omnipaque 350 .  Oral contrast was not administered.    COMPARISON:  05/21/2024.    FINDINGS:  Abdomen:    - Lower thorax:Unremarkable.    - Lung bases: No infiltrates and no nodules.    - Liver: Mild cardiomegaly, similar to prior.    - Gallbladder: Surgically absent.    - Bile Ducts: No evidence of intra or extra hepatic biliary ductal dilation.    - Spleen: Mild splenomegaly, similar to prior.    - Kidneys: No mass or hydronephrosis.    - Adrenals: Unremarkable.    - Pancreas: No mass or peripancreatic fat stranding.    - Retroperitoneum:  No significant adenopathy.    - Vascular: No abdominal aortic aneurysm.    - Abdominal wall:  Unremarkable.    Pelvis:    No pelvic mass, adenopathy, or free fluid.  Absent uterus.    Bowel/Mesentery:    No evidence of bowel obstruction or inflammation.  Moderate stool in the right colon.    Bones:  No acute osseous abnormality and no suspicious lytic or blastic lesion.  Impression: Prior cholecystectomy and hysterectomy.    Mild hepatosplenomegaly, similar to prior.    No bowel obstruction.  No acute findings identified.    Moderate " stool right colon.  Correlate clinically for constipation.    Electronically signed by: Ted Mcduffie MD  Date:    10/11/2024  Time:    23:28  XR KUB  Narrative: EXAMINATION:  XR KUB    CLINICAL HISTORY:  Generalized abdominal pain    TECHNIQUE:  Single AP supine view of the abdomen (KUB) was performed    COMPARISON:  09/06/2024    FINDINGS:  Scattered gas throughout the stomach small and large bowel.  Moderate degree of stool in the right colon and rectum.  There are a few scattered bowel fluid fluid levels and there are a few prominent gas-filled small bowel loops in the left abdomen without overt dilation.  Cholecystectomy clips.  No pathologic abdominal calcification.  Impression: Nonspecific bowel gas pattern.  CT may add further characterization.    Electronically signed by: Dima Camilo  Date:    10/11/2024  Time:    17:30      I have reviewed prior labs, imaging, and notes.      Assessment:  1. Chronic idiopathic constipation    2. Generalized abdominal pain      Worsening constipation over last two months. Incomplete emptying with bowel movements.   Passing bloody mucus at times. Generalized abdominal pain worse on right side.   Recent CT and abdominal xray with moderate stool burden.   Has tried mg citrate, stool softener, fiber, laxatives, and enemas.     Plan:  Orders Placed This Encounter    sodium,potassium,mag sulfates (SUPREP BOWEL PREP KIT) 17.5-3.13-1.6 gram SolR    linaCLOtide (LINZESS) 145 mcg Cap capsule    Case Request Endoscopy: COLONOSCOPY     Mini cleanout   Linzess 145mcg daily  Colonoscopy. Suprep.     Plan of care discussed with patient who is in agreement and verbalized understanding.     I have explained the planned procedures to the patient.The risks, benefits and alternatives of the procedure were also explained in detail. Patient verbalized understanding, all questions were answered. The patient agrees to proceed as planned    Follow Up:           Indy De Paz,  APRN,FNP-BC  [facility]    (Portions of this note were dictated using voice recognition software and may contain dictation related errors in spelling/grammar/syntax not found on text review)

## 2024-10-23 ENCOUNTER — PATIENT MESSAGE (OUTPATIENT)
Dept: PRIMARY CARE CLINIC | Facility: CLINIC | Age: 34
End: 2024-10-23
Payer: MEDICAID

## 2024-10-30 ENCOUNTER — TELEPHONE (OUTPATIENT)
Dept: PRIMARY CARE CLINIC | Facility: CLINIC | Age: 34
End: 2024-10-30
Payer: MEDICAID

## 2024-10-30 ENCOUNTER — PATIENT MESSAGE (OUTPATIENT)
Dept: PRIMARY CARE CLINIC | Facility: CLINIC | Age: 34
End: 2024-10-30
Payer: MEDICAID

## 2024-10-31 ENCOUNTER — PATIENT MESSAGE (OUTPATIENT)
Dept: RHEUMATOLOGY | Facility: CLINIC | Age: 34
End: 2024-10-31
Payer: MEDICAID

## 2024-11-18 ENCOUNTER — PATIENT MESSAGE (OUTPATIENT)
Dept: RHEUMATOLOGY | Facility: CLINIC | Age: 34
End: 2024-11-18
Payer: MEDICAID

## 2024-11-20 ENCOUNTER — HOSPITAL ENCOUNTER (EMERGENCY)
Facility: HOSPITAL | Age: 34
Discharge: LEFT BEFORE DISCHARGE | End: 2024-11-21
Attending: EMERGENCY MEDICINE
Payer: MEDICAID

## 2024-11-20 DIAGNOSIS — M54.6 THORACIC BACK PAIN: ICD-10-CM

## 2024-11-20 PROCEDURE — 63600175 PHARM REV CODE 636 W HCPCS: Performed by: EMERGENCY MEDICINE

## 2024-11-20 PROCEDURE — 84484 ASSAY OF TROPONIN QUANT: CPT | Performed by: EMERGENCY MEDICINE

## 2024-11-20 PROCEDURE — 36415 COLL VENOUS BLD VENIPUNCTURE: CPT | Performed by: EMERGENCY MEDICINE

## 2024-11-20 PROCEDURE — 83690 ASSAY OF LIPASE: CPT | Performed by: EMERGENCY MEDICINE

## 2024-11-20 PROCEDURE — 80053 COMPREHEN METABOLIC PANEL: CPT | Performed by: EMERGENCY MEDICINE

## 2024-11-20 PROCEDURE — 93010 ELECTROCARDIOGRAM REPORT: CPT | Mod: ,,, | Performed by: GENERAL PRACTICE

## 2024-11-20 PROCEDURE — 85025 COMPLETE CBC W/AUTO DIFF WBC: CPT | Performed by: EMERGENCY MEDICINE

## 2024-11-20 PROCEDURE — 96374 THER/PROPH/DIAG INJ IV PUSH: CPT

## 2024-11-20 PROCEDURE — 99285 EMERGENCY DEPT VISIT HI MDM: CPT | Mod: 25

## 2024-11-20 PROCEDURE — 96375 TX/PRO/DX INJ NEW DRUG ADDON: CPT

## 2024-11-20 PROCEDURE — 93005 ELECTROCARDIOGRAM TRACING: CPT

## 2024-11-20 RX ORDER — KETOROLAC TROMETHAMINE 30 MG/ML
30 INJECTION, SOLUTION INTRAMUSCULAR; INTRAVENOUS
Status: COMPLETED | OUTPATIENT
Start: 2024-11-20 | End: 2024-11-20

## 2024-11-20 RX ORDER — MORPHINE SULFATE 4 MG/ML
4 INJECTION, SOLUTION INTRAMUSCULAR; INTRAVENOUS
Status: COMPLETED | OUTPATIENT
Start: 2024-11-20 | End: 2024-11-20

## 2024-11-20 RX ADMIN — KETOROLAC TROMETHAMINE 30 MG: 30 INJECTION, SOLUTION INTRAMUSCULAR; INTRAVENOUS at 11:11

## 2024-11-20 RX ADMIN — MORPHINE SULFATE 4 MG: 4 INJECTION INTRAVENOUS at 11:11

## 2024-11-20 NOTE — LETTER
Patient: Rosa Crowley  YOB: 1990  Date: 11/21/2024 Time: 2:36 AM  Location: [facility]    Leaving the Hospital Against Medical Advice    Chart #:[id number]    This will certify that I, the undersigned,    ______________________________________________________________________    A patient in the above named medical center, having requested discharge and removal from the medical center against the advice of my attending physician(s), hereby release [facility], its physicians, officers and employees, severally and individually, from any and all liability of any nature whatsoever for any injury or harm or complication of any kind that may result directly or indirectly, by reason of my terminating my stay as a patient at [facility] and my departure from [facility], and hereby waive any and all rights of action I may now have or later acquire as a result of my voluntary departure from [facility] and the termination of my stay as a patient therein.    This release is made with the full knowledge of the danger that may result from the action which I am taking.      Date:_______________________                         ___________________________                                                                                    Patient/Legal Representative    Witness:        ____________________________                          ___________________________  Nurse                                                                        Physician

## 2024-11-21 ENCOUNTER — PATIENT MESSAGE (OUTPATIENT)
Dept: OBSTETRICS AND GYNECOLOGY | Facility: CLINIC | Age: 34
End: 2024-11-21
Payer: MEDICAID

## 2024-11-21 VITALS
BODY MASS INDEX: 32.03 KG/M2 | TEMPERATURE: 99 F | OXYGEN SATURATION: 96 % | HEIGHT: 64 IN | RESPIRATION RATE: 20 BRPM | WEIGHT: 187.63 LBS | SYSTOLIC BLOOD PRESSURE: 112 MMHG | HEART RATE: 91 BPM | DIASTOLIC BLOOD PRESSURE: 73 MMHG

## 2024-11-21 LAB
ALBUMIN SERPL BCP-MCNC: 4.1 G/DL (ref 3.5–5.2)
ALP SERPL-CCNC: 67 U/L (ref 40–150)
ALT SERPL W/O P-5'-P-CCNC: 12 U/L (ref 10–44)
ANION GAP SERPL CALC-SCNC: 10 MMOL/L (ref 8–16)
AST SERPL-CCNC: 12 U/L (ref 10–40)
BACTERIA #/AREA URNS HPF: NORMAL /HPF
BASOPHILS # BLD AUTO: 0.04 K/UL (ref 0–0.2)
BASOPHILS NFR BLD: 0.6 % (ref 0–1.9)
BILIRUB SERPL-MCNC: 0.8 MG/DL (ref 0.1–1)
BILIRUB UR QL STRIP: NEGATIVE
BUN SERPL-MCNC: 11 MG/DL (ref 6–20)
CALCIUM SERPL-MCNC: 9.4 MG/DL (ref 8.7–10.5)
CHLORIDE SERPL-SCNC: 107 MMOL/L (ref 95–110)
CLARITY UR: ABNORMAL
CO2 SERPL-SCNC: 20 MMOL/L (ref 23–29)
COLOR UR: YELLOW
CREAT SERPL-MCNC: 0.8 MG/DL (ref 0.5–1.4)
DIFFERENTIAL METHOD BLD: ABNORMAL
EOSINOPHIL # BLD AUTO: 0.3 K/UL (ref 0–0.5)
EOSINOPHIL NFR BLD: 4.5 % (ref 0–8)
ERYTHROCYTE [DISTWIDTH] IN BLOOD BY AUTOMATED COUNT: 13.9 % (ref 11.5–14.5)
EST. GFR  (NO RACE VARIABLE): >60 ML/MIN/1.73 M^2
GLUCOSE SERPL-MCNC: 97 MG/DL (ref 70–110)
GLUCOSE UR QL STRIP: NEGATIVE
HCT VFR BLD AUTO: 35 % (ref 37–48.5)
HGB BLD-MCNC: 12.3 G/DL (ref 12–16)
HGB UR QL STRIP: NEGATIVE
HYALINE CASTS #/AREA URNS LPF: 0 /LPF
IMM GRANULOCYTES # BLD AUTO: 0.01 K/UL (ref 0–0.04)
IMM GRANULOCYTES NFR BLD AUTO: 0.1 % (ref 0–0.5)
KETONES UR QL STRIP: NEGATIVE
LEUKOCYTE ESTERASE UR QL STRIP: NEGATIVE
LIPASE SERPL-CCNC: 28 U/L (ref 4–60)
LYMPHOCYTES # BLD AUTO: 2.1 K/UL (ref 1–4.8)
LYMPHOCYTES NFR BLD: 30.8 % (ref 18–48)
MCH RBC QN AUTO: 32.2 PG (ref 27–31)
MCHC RBC AUTO-ENTMCNC: 35.1 G/DL (ref 32–36)
MCV RBC AUTO: 92 FL (ref 82–98)
MICROSCOPIC COMMENT: NORMAL
MONOCYTES # BLD AUTO: 0.6 K/UL (ref 0.3–1)
MONOCYTES NFR BLD: 8.3 % (ref 4–15)
NEUTROPHILS # BLD AUTO: 3.8 K/UL (ref 1.8–7.7)
NEUTROPHILS NFR BLD: 55.7 % (ref 38–73)
NITRITE UR QL STRIP: NEGATIVE
NRBC BLD-RTO: 0 /100 WBC
OHS QRS DURATION: 96 MS
OHS QTC CALCULATION: 457 MS
PH UR STRIP: 7 [PH] (ref 5–8)
PLATELET # BLD AUTO: 208 K/UL (ref 150–450)
PMV BLD AUTO: 9.6 FL (ref 9.2–12.9)
POTASSIUM SERPL-SCNC: 4.2 MMOL/L (ref 3.5–5.1)
PROT SERPL-MCNC: 7 G/DL (ref 6–8.4)
PROT UR QL STRIP: ABNORMAL
RBC # BLD AUTO: 3.82 M/UL (ref 4–5.4)
RBC #/AREA URNS HPF: 4 /HPF (ref 0–4)
SODIUM SERPL-SCNC: 137 MMOL/L (ref 136–145)
SP GR UR STRIP: 1.03 (ref 1–1.03)
SQUAMOUS #/AREA URNS HPF: 4 /HPF
TROPONIN I SERPL DL<=0.01 NG/ML-MCNC: <0.006 NG/ML (ref 0–0.03)
URN SPEC COLLECT METH UR: ABNORMAL
UROBILINOGEN UR STRIP-ACNC: NEGATIVE EU/DL
WBC # BLD AUTO: 6.85 K/UL (ref 3.9–12.7)
WBC #/AREA URNS HPF: 1 /HPF (ref 0–5)

## 2024-11-21 PROCEDURE — 25500020 PHARM REV CODE 255

## 2024-11-21 PROCEDURE — 81000 URINALYSIS NONAUTO W/SCOPE: CPT | Performed by: EMERGENCY MEDICINE

## 2024-11-21 RX ADMIN — IOHEXOL 100 ML: 350 INJECTION, SOLUTION INTRAVENOUS at 12:11
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
## 2024-11-21 NOTE — ED NOTES
Dr García aware of pt leaving AMA. CTA resulted but CT not resulted yet. Relayed this info to pt and she still requests to leave AMA.

## 2024-11-21 NOTE — ED NOTES
Pt reports she wants to leave. Educated pt that she will be leaving AMA. Pt verbalized understanding and signed AMA form. Pt reports she just wants to go home, she is uncomfortable and can be more comfortable at home. Pt also reports she has to get her daughter to an event at 0730 and she is tired.

## 2024-11-25 ENCOUNTER — TELEPHONE (OUTPATIENT)
Dept: UROLOGY | Facility: CLINIC | Age: 34
End: 2024-11-25
Payer: MEDICAID

## 2024-12-04 ENCOUNTER — OFFICE VISIT (OUTPATIENT)
Dept: UROLOGY | Facility: CLINIC | Age: 34
End: 2024-12-04
Payer: MEDICAID

## 2024-12-04 DIAGNOSIS — N30.10 INTERSTITIAL CYSTITIS: Primary | ICD-10-CM

## 2024-12-04 NOTE — PROGRESS NOTES
The patient location is: [place]  The chief complaint leading to consultation is: medication refill     Visit type: audiovisual    Face to Face time with patient: 5   10  minutes of total time spent on the encounter, which includes face to face time and non-face to face time preparing to see the patient (eg, review of tests), Obtaining and/or reviewing separately obtained history, Documenting clinical information in the electronic or other health record, Independently interpreting results (not separately reported) and communicating results to the patient/family/caregiver, or Care coordination (not separately reported).         Each patient to whom he or she provides medical services by telemedicine is:  (1) informed of the relationship between the physician and patient and the respective role of any other health care provider with respect to management of the patient; and (2) notified that he or she may decline to receive medical services by telemedicine and may withdraw from such care at any time.    Notes:    Subjective:      Rosa Crowley is a 34 y.o. female who returns today regarding medication refill.    History of IC; symptoms well controlled with lifestyle changes, daily amitriptyline 10 mg and uribel PRN.   Last UTI in March, e.coli; symptoms resolved with Macrobid  Recently diagnosed with Lupus and RA      The following portions of the patient's history were reviewed and updated as appropriate: allergies, current medications, past family history, past medical history, past social history, past surgical history and problem list.    Review of Systems  A comprehensive multipoint review of systems was negative except as otherwise stated in the HPI.     Objective:   Vitals: LMP 02/10/2022       Physical Exam   General: alert and oriented, no acute distress  Head: normocephalic, atraumatic  Neck: supple, no lymphadenopathy, normal ROM, no masses      Lab Review   Urinalysis demonstrates   Lab  Results   Component Value Date    WBC 6.85 11/20/2024    HGB 12.3 11/20/2024    HCT 35.0 (L) 11/20/2024    MCV 92 11/20/2024     11/20/2024     Lab Results   Component Value Date    CREATININE 0.8 11/20/2024    BUN 11 11/20/2024       Imaging     CT Renal Stone Study ABD Pelvis WO    Narrative  EXAMINATION:  CT RENAL STONE STUDY ABD PELVIS WO    CLINICAL HISTORY:  Flank pain, kidney stone suspected;    TECHNIQUE:  Low dose axial images, sagittal and coronal reformations were obtained from the lung bases to the pubic symphysis.  Contrast was not administered.    COMPARISON:  CT abdomen and pelvis with contrast dated 03/31/2020.    FINDINGS:  The lung bases are clear.  No pleural effusions are identified.  Bony structures appear intact.  There is no evidence for acute fracture or bone destruction.    There is an elongated right lobe of the liver which can be a normal finding and this is unchanged.  There is a subtle subcentimeter hypodensity within the caudal right lobe of the liver which may represent a small cyst and this was also noted on the prior CT examination dated 01/31/2013 with little interval change.  No new liver lesions are identified.  The gallbladder is present and appears grossly unremarkable.  No biliary dilatation is appreciated on this noncontrast examination.  The spleen is mildly enlarged as before and homogeneous in density with no focal splenic lesions identified.  The stomach and pancreas both appear grossly unremarkable.  The adrenal glands are not enlarged.  The kidneys are normal in size, position, and contour.  There is no evidence for abnormal renal masses or hydronephrosis.  There is no evidence for renal or ureteral calculi.  The abdominal aorta tapers normally without aneurysmal dilatation.  No para-aortic lymphadenopathy is identified.  The appendix is present and appears grossly unremarkable.  No dilated loops of bowel are evident.  The urinary bladder appears grossly  unremarkable.  The uterus is absent.  No abnormal adnexal masses are identified.  No ascites is identified.  There is no evidence for pelvic or inguinal lymphadenopathy.  There is a small fat containing umbilical hernia.    Impression  No renal or ureteral calculi identified.    Subcentimeter hepatic hypodensity, likely a small cyst and stable dating back to 01/31/2013.    Mild splenomegaly.    S/p hysterectomy.    Tiny fat containing umbilical hernia.      Electronically signed by: Juan Carlos Barker MD  Date:    02/28/2023  Time:    09:07          Assessment and Plan:   1. Interstitial cystitis  --continue amitriptyline   --continue Uribel PRN   --standing UC order in place     --rtc in 1 year; sooner for new or worsening symptoms       This note is dictated on M*Modal word recognition program.  There are word recognition mistakes that are occasionally missed on review.

## 2024-12-09 ENCOUNTER — PATIENT MESSAGE (OUTPATIENT)
Dept: RHEUMATOLOGY | Facility: CLINIC | Age: 34
End: 2024-12-09
Payer: MEDICAID

## 2024-12-10 ENCOUNTER — OFFICE VISIT (OUTPATIENT)
Dept: OBSTETRICS AND GYNECOLOGY | Facility: CLINIC | Age: 34
End: 2024-12-10
Payer: MEDICAID

## 2024-12-10 VITALS
HEIGHT: 64 IN | DIASTOLIC BLOOD PRESSURE: 72 MMHG | BODY MASS INDEX: 32.63 KG/M2 | SYSTOLIC BLOOD PRESSURE: 110 MMHG | WEIGHT: 191.13 LBS

## 2024-12-10 DIAGNOSIS — R10.2 PELVIC PAIN: Primary | ICD-10-CM

## 2024-12-10 PROCEDURE — 99214 OFFICE O/P EST MOD 30 MIN: CPT | Mod: PBBFAC,PN | Performed by: STUDENT IN AN ORGANIZED HEALTH CARE EDUCATION/TRAINING PROGRAM

## 2024-12-10 PROCEDURE — 99214 OFFICE O/P EST MOD 30 MIN: CPT | Mod: S$PBB,,, | Performed by: STUDENT IN AN ORGANIZED HEALTH CARE EDUCATION/TRAINING PROGRAM

## 2024-12-10 PROCEDURE — 3008F BODY MASS INDEX DOCD: CPT | Mod: CPTII,,, | Performed by: STUDENT IN AN ORGANIZED HEALTH CARE EDUCATION/TRAINING PROGRAM

## 2024-12-10 PROCEDURE — 3078F DIAST BP <80 MM HG: CPT | Mod: CPTII,,, | Performed by: STUDENT IN AN ORGANIZED HEALTH CARE EDUCATION/TRAINING PROGRAM

## 2024-12-10 PROCEDURE — 1159F MED LIST DOCD IN RCRD: CPT | Mod: CPTII,,, | Performed by: STUDENT IN AN ORGANIZED HEALTH CARE EDUCATION/TRAINING PROGRAM

## 2024-12-10 PROCEDURE — 3074F SYST BP LT 130 MM HG: CPT | Mod: CPTII,,, | Performed by: STUDENT IN AN ORGANIZED HEALTH CARE EDUCATION/TRAINING PROGRAM

## 2024-12-10 PROCEDURE — 99999 PR PBB SHADOW E&M-EST. PATIENT-LVL IV: CPT | Mod: PBBFAC,,, | Performed by: STUDENT IN AN ORGANIZED HEALTH CARE EDUCATION/TRAINING PROGRAM

## 2024-12-10 PROCEDURE — 3044F HG A1C LEVEL LT 7.0%: CPT | Mod: CPTII,,, | Performed by: STUDENT IN AN ORGANIZED HEALTH CARE EDUCATION/TRAINING PROGRAM
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
## 2024-12-11 ENCOUNTER — LAB VISIT (OUTPATIENT)
Dept: LAB | Facility: HOSPITAL | Age: 34
End: 2024-12-11
Payer: MEDICAID

## 2024-12-11 DIAGNOSIS — M32.9 SLE (SYSTEMIC LUPUS ERYTHEMATOSUS RELATED SYNDROME): ICD-10-CM

## 2024-12-11 LAB
ALBUMIN SERPL BCP-MCNC: 4.4 G/DL (ref 3.5–5.2)
ALP SERPL-CCNC: 68 U/L (ref 55–135)
ALT SERPL W/O P-5'-P-CCNC: 9 U/L (ref 10–44)
ANION GAP SERPL CALC-SCNC: 5 MMOL/L (ref 8–16)
AST SERPL-CCNC: 10 U/L (ref 10–40)
BASOPHILS # BLD AUTO: 0.03 K/UL (ref 0–0.2)
BASOPHILS NFR BLD: 0.7 % (ref 0–1.9)
BILIRUB SERPL-MCNC: 0.8 MG/DL (ref 0.1–1)
BUN SERPL-MCNC: 8 MG/DL (ref 6–20)
CALCIUM SERPL-MCNC: 9.1 MG/DL (ref 8.7–10.5)
CHLORIDE SERPL-SCNC: 107 MMOL/L (ref 95–110)
CO2 SERPL-SCNC: 26 MMOL/L (ref 23–29)
CREAT SERPL-MCNC: 0.7 MG/DL (ref 0.5–1.4)
CRP SERPL-MCNC: 0.1 MG/DL
DIFFERENTIAL METHOD BLD: ABNORMAL
EOSINOPHIL # BLD AUTO: 0.3 K/UL (ref 0–0.5)
EOSINOPHIL NFR BLD: 6.9 % (ref 0–8)
ERYTHROCYTE [DISTWIDTH] IN BLOOD BY AUTOMATED COUNT: 13.3 % (ref 11.5–14.5)
ERYTHROCYTE [SEDIMENTATION RATE] IN BLOOD BY WESTERGREN METHOD: 1 MM/HR (ref 0–20)
EST. GFR  (NO RACE VARIABLE): >60 ML/MIN/1.73 M^2
GLUCOSE SERPL-MCNC: 94 MG/DL (ref 70–110)
HCT VFR BLD AUTO: 35.7 % (ref 37–48.5)
HGB BLD-MCNC: 12.1 G/DL (ref 12–16)
IMM GRANULOCYTES # BLD AUTO: 0 K/UL (ref 0–0.04)
IMM GRANULOCYTES NFR BLD AUTO: 0 % (ref 0–0.5)
LYMPHOCYTES # BLD AUTO: 1.4 K/UL (ref 1–4.8)
LYMPHOCYTES NFR BLD: 32.8 % (ref 18–48)
MCH RBC QN AUTO: 31 PG (ref 27–31)
MCHC RBC AUTO-ENTMCNC: 33.9 G/DL (ref 32–36)
MCV RBC AUTO: 92 FL (ref 82–98)
MONOCYTES # BLD AUTO: 0.4 K/UL (ref 0.3–1)
MONOCYTES NFR BLD: 9.7 % (ref 4–15)
NEUTROPHILS # BLD AUTO: 2.2 K/UL (ref 1.8–7.7)
NEUTROPHILS NFR BLD: 49.9 % (ref 38–73)
NRBC BLD-RTO: 0 /100 WBC
PLATELET # BLD AUTO: 207 K/UL (ref 150–450)
PMV BLD AUTO: 9.4 FL (ref 9.2–12.9)
POTASSIUM SERPL-SCNC: 4.4 MMOL/L (ref 3.5–5.1)
PROT SERPL-MCNC: 7 G/DL (ref 6–8.4)
RBC # BLD AUTO: 3.9 M/UL (ref 4–5.4)
SODIUM SERPL-SCNC: 138 MMOL/L (ref 136–145)
WBC # BLD AUTO: 4.33 K/UL (ref 3.9–12.7)

## 2024-12-11 PROCEDURE — 86140 C-REACTIVE PROTEIN: CPT | Performed by: STUDENT IN AN ORGANIZED HEALTH CARE EDUCATION/TRAINING PROGRAM

## 2024-12-11 PROCEDURE — 85651 RBC SED RATE NONAUTOMATED: CPT | Performed by: STUDENT IN AN ORGANIZED HEALTH CARE EDUCATION/TRAINING PROGRAM

## 2024-12-11 PROCEDURE — 36415 COLL VENOUS BLD VENIPUNCTURE: CPT | Performed by: STUDENT IN AN ORGANIZED HEALTH CARE EDUCATION/TRAINING PROGRAM

## 2024-12-11 PROCEDURE — 80053 COMPREHEN METABOLIC PANEL: CPT | Performed by: STUDENT IN AN ORGANIZED HEALTH CARE EDUCATION/TRAINING PROGRAM

## 2024-12-11 PROCEDURE — 85025 COMPLETE CBC W/AUTO DIFF WBC: CPT | Performed by: STUDENT IN AN ORGANIZED HEALTH CARE EDUCATION/TRAINING PROGRAM

## 2024-12-12 ENCOUNTER — TELEPHONE (OUTPATIENT)
Dept: RHEUMATOLOGY | Facility: CLINIC | Age: 34
End: 2024-12-12
Payer: MEDICAID

## 2024-12-12 ENCOUNTER — PATIENT MESSAGE (OUTPATIENT)
Dept: RHEUMATOLOGY | Facility: CLINIC | Age: 34
End: 2024-12-12
Payer: MEDICAID

## 2024-12-12 NOTE — TELEPHONE ENCOUNTER
----- Message from Ilana sent at 12/12/2024  3:02 PM CST -----  Contact: pt  Type:  Sooner Appointment Request    Caller is requesting a sooner appointment.  Caller declined first available appointment listed below.  Caller will not accept being placed on the waitlist and is requesting a message be sent to doctor.  Name of Caller:pt  When is the first available appointment?books are closed  Symptoms:3 month f/u  Would the patient rather a call back or a response via [facility]? call  Best Call Back Number:[phone]  Additional Information:

## 2024-12-12 NOTE — TELEPHONE ENCOUNTER
----- Message from Ilana sent at 12/12/2024  3:02 PM CST -----  Contact: pt  Type:  Sooner Appointment Request    Caller is requesting a sooner appointment.  Caller declined first available appointment listed below.  Caller will not accept being placed on the waitlist and is requesting a message be sent to doctor.  Name of Caller:pt  When is the first available appointment?books are closed  Symptoms:3 month f/u  Would the patient rather a call back or a response via [facility]? call  Best Call Back Number:[phone]  Additional Information:

## 2024-12-15 ENCOUNTER — PATIENT MESSAGE (OUTPATIENT)
Dept: RHEUMATOLOGY | Facility: CLINIC | Age: 34
End: 2024-12-15
Payer: MEDICAID

## 2024-12-18 ENCOUNTER — OFFICE VISIT (OUTPATIENT)
Dept: GASTROENTEROLOGY | Facility: CLINIC | Age: 34
End: 2024-12-18
Payer: MEDICAID

## 2024-12-18 VITALS
HEART RATE: 122 BPM | HEIGHT: 64 IN | SYSTOLIC BLOOD PRESSURE: 112 MMHG | DIASTOLIC BLOOD PRESSURE: 82 MMHG | WEIGHT: 181.13 LBS | BODY MASS INDEX: 30.92 KG/M2 | OXYGEN SATURATION: 100 %

## 2024-12-18 DIAGNOSIS — M62.89 PELVIC FLOOR DYSFUNCTION IN FEMALE: ICD-10-CM

## 2024-12-18 DIAGNOSIS — K59.00 CONSTIPATION, UNSPECIFIED CONSTIPATION TYPE: ICD-10-CM

## 2024-12-18 DIAGNOSIS — K58.1 IRRITABLE BOWEL SYNDROME WITH CONSTIPATION: ICD-10-CM

## 2024-12-18 DIAGNOSIS — K59.04 CHRONIC IDIOPATHIC CONSTIPATION: Primary | ICD-10-CM

## 2024-12-18 PROCEDURE — 3044F HG A1C LEVEL LT 7.0%: CPT | Mod: CPTII,,, | Performed by: INTERNAL MEDICINE

## 2024-12-18 PROCEDURE — 3074F SYST BP LT 130 MM HG: CPT | Mod: CPTII,,, | Performed by: INTERNAL MEDICINE

## 2024-12-18 PROCEDURE — 99999 PR PBB SHADOW E&M-EST. PATIENT-LVL V: CPT | Mod: PBBFAC,,, | Performed by: INTERNAL MEDICINE

## 2024-12-18 PROCEDURE — 3008F BODY MASS INDEX DOCD: CPT | Mod: CPTII,,, | Performed by: INTERNAL MEDICINE

## 2024-12-18 PROCEDURE — 3079F DIAST BP 80-89 MM HG: CPT | Mod: CPTII,,, | Performed by: INTERNAL MEDICINE

## 2024-12-18 PROCEDURE — 99214 OFFICE O/P EST MOD 30 MIN: CPT | Mod: S$PBB,,, | Performed by: INTERNAL MEDICINE

## 2024-12-18 PROCEDURE — 1159F MED LIST DOCD IN RCRD: CPT | Mod: CPTII,,, | Performed by: INTERNAL MEDICINE

## 2024-12-18 PROCEDURE — 99215 OFFICE O/P EST HI 40 MIN: CPT | Mod: PBBFAC,PN | Performed by: INTERNAL MEDICINE

## 2024-12-18 RX ORDER — MELOXICAM 7.5 MG/1
7.5 TABLET ORAL DAILY PRN
COMMUNITY
Start: 2024-08-20 | End: 2024-12-20

## 2024-12-18 RX ORDER — TRAMADOL HYDROCHLORIDE 50 MG/1
50 TABLET ORAL EVERY 8 HOURS PRN
COMMUNITY
Start: 2024-08-20 | End: 2024-12-20

## 2024-12-18 NOTE — PROGRESS NOTES
GASTROENTEROLOGY CLINIC NOTE    Reason for visit: The primary encounter diagnosis was Chronic idiopathic constipation. Diagnoses of Constipation, unspecified constipation type, Irritable bowel syndrome with constipation, and Pelvic floor dysfunction in female were also pertinent to this visit.  Referring provider/PCP: Selvin Morocho MD    HPI:  Rosa Crowley is a 34 y.o. female here today for constipation  Previously established with our NP alfonzo    Longstanding constipation, all her life. Worsening here in past couple months if not longer. Doesn't go but maybe one to two BM / month.  Lots of straining. Incomplete evacuation.  Will feel bloated and stomach soreness when doesn't have a BM.   Baseline BM maybe once a month.   Linzess 145 - was working up until about 2 weeks ago, would have a BM nearly every day , then just stopped working.        Prior Upper Endoscopy: 2023 with Dr. Oneill  Impression:            - Normal esophagus.                          - Erythematous mucosa in the antrum. Biopsied.                          - Normal examined duodenum.     Pathology:  STOMACH, BIOPSY:   - Gastric mucosa (antral and oxyntic-types) with mild chronic inflammation   - No evidence of Helicobacter-like organisms (an H. pylori immunohistochemical study is negative)     Prior Colonoscopy:   10/2024 edgardo  Impression:            - The examined portion of the ileum was normal.                          - Internal hemorrhoids.                          - The examination was otherwise normal.                          - No specimens collected.   Recommendation:                                - Repeat colonoscopy at age 43 for screening                          purposes.     About 15 years ago d/t hematochezia and constipation  Unsure of findings    (Portions of this note were dictated using voice recognition software and may contain dictation related errors in spelling/grammar/syntax not found on text  review)    Review of Systems   Constitutional:  Negative for fever and malaise/fatigue.   Respiratory:  Negative for cough and shortness of breath.    Cardiovascular:  Negative for chest pain and palpitations.   Gastrointestinal:  Positive for constipation. Negative for abdominal pain, blood in stool, nausea and vomiting.   Neurological:  Negative for dizziness and headaches.       Past Medical History: has a past medical history of Abnormal Pap smear of cervix, Anemia, Breast disorder, Cancer, History of bilateral tubal ligation, Interstitial cystitis, Mental disorder, Osteoarthritis, PTSD (post-traumatic stress disorder), Rheumatoid arteritis, Tachycardia, and Vulvar intraepithelial neoplasia (BREE).    Past Surgical History: has a past surgical history that includes Anterior vaginal repair (2010); Salpingectomy (Bilateral, 2014); Vulva surgery; Tonsillectomy (Bilateral, 12/18/2019); Tonsillectomy; Diagnostic laparoscopy (N/A, 12/14/2021); Hysteroscopy with dilation and curettage of uterus (N/A, 12/14/2021); Cystoscopy (N/A, 12/14/2021); Hysteroscopic polypectomy of uterus (N/A, 12/14/2021); Laparoscopic total hysterectomy (N/A, 03/22/2022); Oophorectomy (Left, 03/22/2022); Hysterectomy (N/A, 2022); egd, with closed biopsy; Esophagogastroduodenoscopy (N/A, 09/07/2023); Laparoscopic cholecystectomy (09/27/2023); Laparoscopic cholecystectomy (N/A, 09/27/2023); Breast biopsy (Right, 07/11/2024); biopsy, breast, with radar localization using derik  (Right, 8/27/2024); Excisional biopsy (Left, 8/27/2024); and Colonoscopy (N/A, 10/28/2024).    Family History:family history includes Bladder Cancer in her mother; Breast cancer in her paternal grandmother and another family member; Cervical cancer in her mother; Lymphoma in her maternal grandmother; Multiple myeloma in her maternal aunt; Other cancer in her brother; Ovarian cancer in her mother; Throat cancer in her father and paternal grandfather; Tongue cancer in her  paternal grandmother; Uterine cancer in her mother.    Allergies:   Review of patient's allergies indicates:   Allergen Reactions    Amoxicillin Hives    Corticosteroids (glucocorticoids) Other (See Comments)    Penicillin Hives    Azithromycin Rash     Yeast infection       Social History: reports that she has never smoked. She has never used smokeless tobacco. She reports that she does not drink alcohol and does not use drugs.    Home medications:   Current Outpatient Medications on File Prior to Visit   Medication Sig Dispense Refill    albuterol (PROVENTIL/VENTOLIN HFA) 90 mcg/actuation inhaler Inhale 2 puffs into the lungs every 4 (four) hours as needed for Shortness of Breath or Wheezing. 18 g 11    amitriptyline (ELAVIL) 10 MG tablet Take 10 mg by mouth every evening.      belimumab (BENLYSTA) 200 mg/mL AtIn Inject 1 mL (200 mg total) into the skin every 7 days. 4 mL 11    buPROPion (WELLBUTRIN XL) 150 MG TB24 tablet Take 150 mg by mouth every evening.      butalbital-acetaminophen-caffeine -40 mg (FIORICET, ESGIC) -40 mg per tablet TAKE 1 TABLET BY MOUTH TWICE DAILY AS NEEDED FOR HEADACHE 30 tablet 0    clonazePAM (KLONOPIN) 0.5 MG tablet Take 0.5 mg by mouth 2 (two) times daily as needed.      dextroamphetamine-amphetamine (ADDERALL) 10 mg Tab Take 10 mg by mouth before evening meal. 400pm      dextroamphetamine-amphetamine 30 mg Tab Take 1 tablet by mouth 2 (two) times daily.      drospirenone, contraceptive, 4 mg (28) Tab Take 1 tablet (4 mg total) by mouth Daily. 28 tablet 11    gabapentin (NEURONTIN) 300 MG capsule Take 1 capsule (300 mg total) by mouth 3 (three) times daily. for 10 days 30 capsule 0    hydroxychloroquine (PLAQUENIL) 200 mg tablet Take 2 tablets (400 mg total) by mouth once daily. 60 tablet 11    hydrOXYzine HCL (ATARAX) 25 MG tablet Take 1 tablet (25 mg total) by mouth 4 (four) times daily as needed for Itching. 45 tablet 2    linaCLOtide (LINZESS) 145 mcg Cap capsule Take  1 capsule (145 mcg total) by mouth before breakfast. 30 capsule 11    meloxicam (MOBIC) 7.5 MG tablet Take 7.5 mg by mouth daily as needed.      ondansetron (ZOFRAN-ODT) 8 MG TbDL Take 1 tablet (8 mg total) by mouth 2 (two) times daily. 20 tablet 0    oxybutynin (DITROPAN) 5 MG Tab Take 1 tablet (5 mg total) by mouth 3 (three) times daily as needed (Bladder spasm/pain). 30 tablet 5    traMADoL (ULTRAM) 50 mg tablet Take 50 mg by mouth every 8 (eight) hours as needed.      URIBEL 118-10-40.8-36 mg Cap Take 1 capsule by mouth 4 (four) times daily as needed (bladder pain). 30 capsule 11    VRAYLAR 3 mg Cap Take 3 mg by mouth every evening.      busPIRone (BUSPAR) 15 MG tablet Take 1 tablet (15 mg total) by mouth 3 (three) times daily. (Patient not taking: Reported on 12/18/2024) 60 tablet 1    diclofenac sodium (VOLTAREN) 1 % Gel Apply to painful finger QID (Patient not taking: Reported on 12/18/2024) 100 g 2    docusate sodium (COLACE) 100 MG capsule Take 1 capsule (100 mg total) by mouth 3 (three) times daily as needed. (Patient not taking: Reported on 12/18/2024) 60 capsule 0    etanercept (ENBREL SURECLICK) 50 mg/mL (1 mL) Inject 1 mL (50 mg total) into the skin once a week. 4 mL 11    hyoscyamine (ANASPAZ,LEVSIN) 0.125 mg Tab Take 1 tablet (125 mcg total) by mouth every 4 (four) hours as needed. 30 tablet 0    sodium,potassium,mag sulfates (SUPREP BOWEL PREP KIT) 17.5-3.13-1.6 gram SolR Take 177 mLs by mouth once daily. 1 kit 0     Current Facility-Administered Medications on File Prior to Visit   Medication Dose Route Frequency Provider Last Rate Last Admin    diphenhydrAMINE injection 25 mg  25 mg Intravenous Q6H PRN Selvin Villarreal MD        HYDROmorphone injection 0.5 mg  0.5 mg Intravenous Q5 Min PRN Selvin Villarreal MD   0.5 mg at 12/14/21 1337    LIDOcaine-EPINEPHrine (PF) 1%-1:200,000 injection 20 mL  20 mL Subcutaneous Once Tessie Donald MD        lorazepam injection 0.25 mg  0.25 mg  "Intravenous Once PRN Selvin Villarreal MD        sodium chloride 0.9% bolus 250 mL  250 mL Intravenous Once Selvin Villarreal MD           Vital signs:  /82 (BP Location: Right arm, Patient Position: Sitting)   Pulse (!) 122   Ht 5' 4" (1.626 m)   Wt 82.2 kg (181 lb 1.7 oz)   LMP 02/10/2022   SpO2 100%   BMI 31.09 kg/m²     Physical Exam  Vitals reviewed.   Constitutional:       General: She is not in acute distress.  HENT:      Head: Normocephalic and atraumatic.   Eyes:      General: No scleral icterus.     Conjunctiva/sclera: Conjunctivae normal.   Abdominal:      General: There is no distension.      Palpations: Abdomen is soft.      Tenderness: There is no abdominal tenderness.   Skin:     General: Skin is warm.      Coloration: Skin is not pale.      Findings: No rash.   Neurological:      Mental Status: She is oriented to person, place, and time.      Gait: Gait normal.   Psychiatric:         Mood and Affect: Mood normal.         Behavior: Behavior normal.         I have reviewed prior labs, imaging, notes from last month    Assessment:  1. Chronic idiopathic constipation    2. Constipation, unspecified constipation type    3. Irritable bowel syndrome with constipation    4. Pelvic floor dysfunction in female      Suspected colonic inertia and likely component of pelvic floor dysfunction.    Labs look good including Ca and tsh  Colon normal, CT x2 normal.    Plan:  Orders Placed This Encounter    FL Defecogram    linaCLOtide (LINZESS) 290 mcg Cap capsule    Case Request Endoscopy: MANOMETRY, ANORECTAL       Optimize linzess, increase to 290   If that doesn't work, conside switching to amitiza 24    Shcedule ARM and defecogram    RTC based on above.    Levon Mcmanus MD  [facility]           "

## 2024-12-20 DIAGNOSIS — R39.89 BLADDER PAIN: ICD-10-CM

## 2024-12-20 RX ORDER — METHENAMINE, SODIUM PHOSPHATE, MONOBASIC, MONOHYDRATE, PHENYL SALICYLATE, METHYLENE BLUE, AND HYOSCYAMINE SULFATE 118; 40.8; 36; 10; .12 MG/1; MG/1; MG/1; MG/1; MG/1
1 CAPSULE ORAL 4 TIMES DAILY PRN
Qty: 30 CAPSULE | Refills: 11 | Status: SHIPPED | OUTPATIENT
Start: 2024-12-20

## 2024-12-20 RX ORDER — AMITRIPTYLINE HYDROCHLORIDE 10 MG/1
TABLET, FILM COATED ORAL
Qty: 30 TABLET | Refills: 11 | Status: SHIPPED | OUTPATIENT
Start: 2024-12-20

## 2024-12-23 ENCOUNTER — TELEPHONE (OUTPATIENT)
Dept: ENDOSCOPY | Facility: HOSPITAL | Age: 34
End: 2024-12-23
Payer: MEDICAID

## 2024-12-23 ENCOUNTER — PATIENT MESSAGE (OUTPATIENT)
Dept: UROLOGY | Facility: CLINIC | Age: 34
End: 2024-12-23
Payer: MEDICAID

## 2024-12-23 VITALS — WEIGHT: 180 LBS | HEIGHT: 65 IN | BODY MASS INDEX: 29.99 KG/M2

## 2024-12-23 NOTE — TELEPHONE ENCOUNTER
Referral for procedure from Case request      Spoke to Rosa to schedule procedure(s) Anorectal Manometry (ARM)       Physician to perform procedure(s) Dr. ESME Cantu  Date of Procedure (s) 2/7/25  Arrival Time 12:00 PM  Time of Procedure(s) 1:00 PM   Location of Procedure(s) [place] 4th Floor  Type of Rx Prep sent to patient: Enema  Instructions provided to patient via [facility]    Patient was informed on the following information and verbalized understanding. Screening questionnaire reviewed with patient and complete. No ride arrangements are required for this procedure.   Appointment details are tentative, especially check-in time. Patient will receive a prep-op call 7 days prior to confirm check-in time for procedure. If applicable the patient should contact their pharmacy to verify Rx for procedure prep is ready for pick-up. Patient was advised to call the scheduling department at [phone] if pharmacy states no Rx is available. Patient was advised to call the endoscopy scheduling department if any questions or concerns arise.      [facility] Scheduling Department

## 2024-12-23 NOTE — TELEPHONE ENCOUNTER
----- Message from Levon Mcmanus MD sent at 12/18/2024  4:18 PM CST -----  Please schedule her for Anorectal mano   thanks

## 2025-01-06 ENCOUNTER — PATIENT MESSAGE (OUTPATIENT)
Dept: PRIMARY CARE CLINIC | Facility: CLINIC | Age: 35
End: 2025-01-06
Payer: MEDICAID

## 2025-01-06 ENCOUNTER — PATIENT MESSAGE (OUTPATIENT)
Dept: RHEUMATOLOGY | Facility: CLINIC | Age: 35
End: 2025-01-06
Payer: MEDICAID

## 2025-01-06 ENCOUNTER — PATIENT MESSAGE (OUTPATIENT)
Dept: GASTROENTEROLOGY | Facility: CLINIC | Age: 35
End: 2025-01-06
Payer: MEDICAID

## 2025-01-06 DIAGNOSIS — K92.2 LOWER GI BLEED: Primary | ICD-10-CM

## 2025-01-06 DIAGNOSIS — M62.89 PELVIC FLOOR DYSFUNCTION IN FEMALE: ICD-10-CM

## 2025-01-06 DIAGNOSIS — K62.5 RECTAL BLEEDING: Primary | ICD-10-CM

## 2025-01-06 NOTE — TELEPHONE ENCOUNTER
Pt report lower GI bleed persistent not better had colonoscopy recent ly normal except internal hemorrhoids

## 2025-01-08 ENCOUNTER — HOSPITAL ENCOUNTER (OUTPATIENT)
Dept: RADIOLOGY | Facility: HOSPITAL | Age: 35
Discharge: HOME | End: 2025-01-08
Attending: INTERNAL MEDICINE
Payer: MEDICAID

## 2025-01-08 DIAGNOSIS — K59.04 CHRONIC IDIOPATHIC CONSTIPATION: ICD-10-CM

## 2025-01-08 DIAGNOSIS — K58.1 IRRITABLE BOWEL SYNDROME WITH CONSTIPATION: ICD-10-CM

## 2025-01-08 DIAGNOSIS — K59.00 CONSTIPATION, UNSPECIFIED CONSTIPATION TYPE: ICD-10-CM

## 2025-01-08 PROCEDURE — A9698 NON-RAD CONTRAST MATERIALNOC: HCPCS | Performed by: INTERNAL MEDICINE

## 2025-01-08 PROCEDURE — 74270 X-RAY XM COLON 1CNTRST STD: CPT | Mod: 26,,, | Performed by: RADIOLOGY

## 2025-01-08 PROCEDURE — 74270 X-RAY XM COLON 1CNTRST STD: CPT | Mod: TC

## 2025-01-08 PROCEDURE — 25500020 PHARM REV CODE 255: Performed by: INTERNAL MEDICINE

## 2025-01-08 RX ADMIN — BARIUM SULFATE 60 ML: 0.81 POWDER, FOR SUSPENSION ORAL at 10:01

## 2025-01-08 RX ADMIN — BARIUM SULFATE 454 G: 0.6 CREAM ORAL at 10:01

## 2025-01-09 ENCOUNTER — TELEPHONE (OUTPATIENT)
Dept: SURGERY | Facility: CLINIC | Age: 35
End: 2025-01-09
Payer: MEDICAID

## 2025-01-09 NOTE — TELEPHONE ENCOUNTER
Spoke with patient and discussed upcoming appointment with Dr. Alicea per referral. Details confirmed verbally over phone and viewable in portal.

## 2025-01-23 NOTE — TELEPHONE ENCOUNTER
----- Message from Riddhi Alexandra sent at 1/17/2023  8:37 AM CST -----  Contact: [phone]  Pt has sore throat, headache, and fever (which has gotten up to 102.6, but right now it is 101.4). She states her throat is so painful she was almost crying last night and she does not have tonsils.  She was exposed to covid but she tested yesterday and it came back negative. She is requesting an appt to be seen today. She has medicaid and nothing is pulling up available and am not sure if you want to see her in clinic due to exposure to covid. Can you please call pt to discuss being seen today? Thanks     [Initial Visit] : an initial visit for

## 2025-01-27 ENCOUNTER — PATIENT MESSAGE (OUTPATIENT)
Dept: ADMINISTRATIVE | Facility: OTHER | Age: 35
End: 2025-01-27
Payer: MEDICAID

## 2025-02-04 ENCOUNTER — TELEPHONE (OUTPATIENT)
Dept: GASTROENTEROLOGY | Facility: CLINIC | Age: 35
End: 2025-02-04
Payer: MEDICAID

## 2025-02-04 ENCOUNTER — TELEPHONE (OUTPATIENT)
Dept: ENDOSCOPY | Facility: HOSPITAL | Age: 35
End: 2025-02-04
Payer: MEDICAID

## 2025-02-04 NOTE — TELEPHONE ENCOUNTER
Contacted the patient in regards to below message. Spoke with patient. Patient informed she does not need a ride for the anorectal manometry procedure scheduled on 2/7 as she will not be receiving sedation. Patient verbalized understanding.

## 2025-02-04 NOTE — TELEPHONE ENCOUNTER
----- Message from Sloane sent at 2/4/2025  9:32 AM CST -----  Regarding: Procedure Inquiry  Type: Procedure Inquiry     Who Called:Rosa Crowley      Would the patient rather a call back or a response via [facility]? Call back    Best Call Back Number:  [phone]    Additional Information:Patient calling about questions of needing transportation to procedure 2/7. Patient would like to confirm with office she needs someone to drive her to procedure.

## 2025-02-04 NOTE — TELEPHONE ENCOUNTER
Yakelin Sanabria routed conversation to You1 hour ago (1:52 PM)     Ananth Magallon MA routed conversation to [facility]5 hours ago (9:43 AM)     Ananth Magallon MA5 hours ago (9:43 AM)     EC  Patient has questions regarding procedure.         Note     Ananth Magallon MA5 hours ago (9:42 AM)     EC  ----- Message from Sloane sent at 2/4/2025  9:32 AM CST -----  Regarding: Procedure Inquiry  Type: Procedure Inquiry      Who Called:Rosa Crowley        Would the patient rather a call back or a response via [facility]? Call back     Best Call Back Number:         [phone]     Additional Information:Patient calling about questions of needing transportation to procedure 2/7. Patient would like to confirm with office she needs someone to drive her to procedure.

## 2025-02-06 ENCOUNTER — PATIENT MESSAGE (OUTPATIENT)
Dept: RHEUMATOLOGY | Facility: CLINIC | Age: 35
End: 2025-02-06
Payer: MEDICAID

## 2025-02-06 DIAGNOSIS — M32.9 SLE (SYSTEMIC LUPUS ERYTHEMATOSUS RELATED SYNDROME): Primary | ICD-10-CM

## 2025-02-07 ENCOUNTER — TELEPHONE (OUTPATIENT)
Dept: ENDOSCOPY | Facility: HOSPITAL | Age: 35
End: 2025-02-07
Payer: MEDICAID

## 2025-02-07 NOTE — TELEPHONE ENCOUNTER
Received: Today  Yakelin Sanabria Terri, RN  Caller: pt @ [phone] (Today,  7:51 AM)         Previous Messages       ----- Message -----  From: Charu Soler  Sent: 2/7/2025   8:04 AM CST  To: [facility]    Rosa Crowley calling regarding Appointment Access  (message) for *pt is calling to cancel procedure 2/7, asking for call back    Called pt. Pt does not want to have procedure. Pt removed from 2/7/25 endoscopy schedule
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
## 2025-02-11 ENCOUNTER — OFFICE VISIT (OUTPATIENT)
Dept: SURGERY | Facility: CLINIC | Age: 35
End: 2025-02-11
Payer: MEDICAID

## 2025-02-11 VITALS
SYSTOLIC BLOOD PRESSURE: 122 MMHG | WEIGHT: 202.81 LBS | DIASTOLIC BLOOD PRESSURE: 86 MMHG | HEIGHT: 64 IN | BODY MASS INDEX: 34.62 KG/M2 | HEART RATE: 115 BPM

## 2025-02-11 DIAGNOSIS — K62.5 RECTAL BLEEDING: ICD-10-CM

## 2025-02-11 DIAGNOSIS — M62.89 PELVIC FLOOR DYSFUNCTION IN FEMALE: ICD-10-CM

## 2025-02-11 DIAGNOSIS — K92.2 LOWER GI BLEED: ICD-10-CM

## 2025-02-11 PROCEDURE — 46600 DIAGNOSTIC ANOSCOPY SPX: CPT | Mod: S$PBB,,, | Performed by: COLON & RECTAL SURGERY

## 2025-02-11 PROCEDURE — 99203 OFFICE O/P NEW LOW 30 MIN: CPT | Mod: S$PBB,25,, | Performed by: COLON & RECTAL SURGERY

## 2025-02-11 PROCEDURE — 99215 OFFICE O/P EST HI 40 MIN: CPT | Mod: PBBFAC | Performed by: COLON & RECTAL SURGERY

## 2025-02-11 PROCEDURE — 1159F MED LIST DOCD IN RCRD: CPT | Mod: CPTII,,, | Performed by: COLON & RECTAL SURGERY

## 2025-02-11 PROCEDURE — 99999 PR PBB SHADOW E&M-EST. PATIENT-LVL V: CPT | Mod: PBBFAC,,, | Performed by: COLON & RECTAL SURGERY

## 2025-02-11 PROCEDURE — 3074F SYST BP LT 130 MM HG: CPT | Mod: CPTII,,, | Performed by: COLON & RECTAL SURGERY

## 2025-02-11 PROCEDURE — 3079F DIAST BP 80-89 MM HG: CPT | Mod: CPTII,,, | Performed by: COLON & RECTAL SURGERY

## 2025-02-11 PROCEDURE — 46600 DIAGNOSTIC ANOSCOPY SPX: CPT | Mod: PBBFAC | Performed by: COLON & RECTAL SURGERY

## 2025-02-11 PROCEDURE — 1160F RVW MEDS BY RX/DR IN RCRD: CPT | Mod: CPTII,,, | Performed by: COLON & RECTAL SURGERY

## 2025-02-11 PROCEDURE — 3008F BODY MASS INDEX DOCD: CPT | Mod: CPTII,,, | Performed by: COLON & RECTAL SURGERY

## 2025-02-20 ENCOUNTER — PATIENT MESSAGE (OUTPATIENT)
Dept: ADMINISTRATIVE | Facility: OTHER | Age: 35
End: 2025-02-20
Payer: MEDICAID

## 2025-03-06 ENCOUNTER — PATIENT MESSAGE (OUTPATIENT)
Dept: OBSTETRICS AND GYNECOLOGY | Facility: CLINIC | Age: 35
End: 2025-03-06
Payer: MEDICAID

## 2025-03-10 ENCOUNTER — PATIENT MESSAGE (OUTPATIENT)
Dept: RHEUMATOLOGY | Facility: CLINIC | Age: 35
End: 2025-03-10
Payer: MEDICAID

## 2025-03-10 ENCOUNTER — OFFICE VISIT (OUTPATIENT)
Dept: RHEUMATOLOGY | Facility: CLINIC | Age: 35
End: 2025-03-10
Payer: MEDICAID

## 2025-03-10 DIAGNOSIS — R76.8 ANA POSITIVE: ICD-10-CM

## 2025-03-10 DIAGNOSIS — M32.9 SLE (SYSTEMIC LUPUS ERYTHEMATOSUS RELATED SYNDROME): Primary | ICD-10-CM

## 2025-03-10 DIAGNOSIS — R21 RASH: ICD-10-CM

## 2025-03-10 DIAGNOSIS — M06.9 RHEUMATOID ARTHRITIS, INVOLVING UNSPECIFIED SITE, UNSPECIFIED WHETHER RHEUMATOID FACTOR PRESENT: ICD-10-CM

## 2025-03-10 DIAGNOSIS — I73.00 RAYNAUD'S DISEASE WITHOUT GANGRENE: ICD-10-CM

## 2025-03-10 DIAGNOSIS — M79.18 MYOFASCIAL PAIN SYNDROME: ICD-10-CM

## 2025-03-10 NOTE — PROGRESS NOTES
The patient location is: [facility]   The chief complaint leading to consultation is: follow up   Visit type: Virtual visit with synchronous audio and video  Total time spent with patient: 20 minutes     Each patient to whom he or she provides medical services by telemedicine is:  (1) informed of the relationship between the physician and patient and the respective role of any other health care provider with respect to management of the patient; and (2) notified that he or she may decline to receive medical services by telemedicine and may withdraw from such care at any time.      Subjective:       Rosa Crowley is a 34 y.o. female who was referred by Selvin Morocho for evaluation of rheumatoid arthritis.  Symptoms have been present for 6 year. Onset was sudden. Symptoms include joint pain (worse at night), joint stiffness (worse in the AM), sleep disturbance due to pain, fatigue, and joint swelling in ankles, knees and hands and are severe in nature. Patient denies eye symptoms and skin nodules. Symptoms are made worse by: cold exposure, lying down, movement, overhead work, overuse, and resting.  Symptoms are helped by OTC pain medications (Ibuprofen 800mg and Tramadol- they help her sleep at night but do not take all discomfort away). Associated symptoms include arthralgia, fatigue, joint pain, morning stiffness, nausea, oral ulcers, rashes/photosensitive, and Raynaud's. Patient denies associated arthralgia, depression, fatigue, joint pain, morning stiffness, nausea, oral ulcers, rashes/photosensitive, and Raynaud's in the toes not in fingers. Overall disease activity is characterized as worse.  Limitation on activities include difficulty performing her job(works at ) and too fatigue to do anything after work - she has 2 girls and this has affected them too.   Cannot take steroids due to bipolar disease- steroid psychosis     Last appt:Patient doing Enbrel and is working with less fatigue and less  joint pain. She is having photosensitive rash everyday. Also feel early satiety and bloated stomach - went to ED and CT abdomen showing hepatosplenomegaly with normal LFTs and CBC. Hematology had a virtual appointment and told patient that there is nothing that was concerning to them. Has appt with hepatology     Last appointment: Patient here fro follow up of SLE and RA  Patient still on Enbrel but having R elbow and wrist pain and swelling   She just had breast duct removal in both breasts- she is in significant discomfort     3/10/2025: Patient here for follow up of SLE and RA   Patient reports that she has been having joint pain and stiffness  Started Benlysta and helping. Rash improved some       Disease History:  Seropositive:  yes  Erosive:  unknown will send proper work up today   Radiographic stage:  unknown will send for xrays today   Functional class:  II    Previous therapies: Patient has had a course of DMARDs in the past- methotrexate but did not improve disease  Disease activity: assessed this visit.  Functional status: assessed this visit.  Disease prognosis: good.    Previous Report(s) Reviewed: ER records, imaging reports:  lab reports, and x-ray reports      Pain in R side- -back area   Ring around ankles -- dark ring   Swollen ankle   General foot pain   Now calmer - better   Once or twice a week ulcers   Hand joint swelling   Headache about everyday since 3 weeks ago -- side in the back   Eye doctor and retinal specialist -- all good every 6  months   High myopia degeneration   Current Outpatient Medications   Medication Sig Dispense Refill    albuterol (PROVENTIL/VENTOLIN HFA) 90 mcg/actuation inhaler Inhale 2 puffs into the lungs every 4 (four) hours as needed for Shortness of Breath or Wheezing. 18 g 11    amitriptyline (ELAVIL) 10 MG tablet TAKE 1 TABLET(10 MG) BY MOUTH EVERY NIGHT AS NEEDED FOR PAIN 30 tablet 11    belimumab (BENLYSTA) 200 mg/mL AtIn Inject 1 mL (200 mg total) into the skin  every 7 days. 4 mL 11    buPROPion (WELLBUTRIN XL) 150 MG TB24 tablet Take 150 mg by mouth every evening.      busPIRone (BUSPAR) 15 MG tablet Take 1 tablet (15 mg total) by mouth 3 (three) times daily. (Patient not taking: Reported on 2/11/2025) 60 tablet 1    butalbital-acetaminophen-caffeine -40 mg (FIORICET, ESGIC) -40 mg per tablet TAKE 1 TABLET BY MOUTH TWICE DAILY AS NEEDED FOR HEADACHE 30 tablet 0    clonazePAM (KLONOPIN) 0.5 MG tablet Take 0.5 mg by mouth 2 (two) times daily as needed.      dextroamphetamine-amphetamine (ADDERALL) 10 mg Tab Take 10 mg by mouth before evening meal. 400pm      dextroamphetamine-amphetamine 30 mg Tab Take 1 tablet by mouth 2 (two) times daily.      diclofenac sodium (VOLTAREN) 1 % Gel Apply to painful finger QID (Patient not taking: Reported on 2/11/2025) 100 g 2    docusate sodium (COLACE) 100 MG capsule Take 1 capsule (100 mg total) by mouth 3 (three) times daily as needed. (Patient not taking: Reported on 2/11/2025) 60 capsule 0    drospirenone, contraceptive, 4 mg (28) Tab Take 1 tablet (4 mg total) by mouth Daily. 28 tablet 11    etanercept (ENBREL SURECLICK) 50 mg/mL (1 mL) Inject 1 mL (50 mg total) into the skin once a week. 4 mL 11    gabapentin (NEURONTIN) 300 MG capsule Take 1 capsule (300 mg total) by mouth 3 (three) times daily. for 10 days 30 capsule 0    hydroxychloroquine (PLAQUENIL) 200 mg tablet Take 2 tablets (400 mg total) by mouth once daily. 60 tablet 11    hydrOXYzine HCL (ATARAX) 25 MG tablet Take 1 tablet (25 mg total) by mouth 4 (four) times daily as needed for Itching. 45 tablet 2    hyoscyamine (ANASPAZ,LEVSIN) 0.125 mg Tab Take 1 tablet (125 mcg total) by mouth every 4 (four) hours as needed. 30 tablet 0    linaCLOtide (LINZESS) 290 mcg Cap capsule Take 1 capsule (290 mcg total) by mouth before breakfast. 90 capsule 3    methen-m.blue-s.phos-phsal-hyo (URIBEL) 118-10-40.8-36 mg Cap Take 1 capsule by mouth 4 (four) times daily as needed  (Bladder pain). 30 capsule 11    ondansetron (ZOFRAN-ODT) 8 MG TbDL Take 1 tablet (8 mg total) by mouth 2 (two) times daily. 20 tablet 0    oxybutynin (DITROPAN) 5 MG Tab Take 1 tablet (5 mg total) by mouth 3 (three) times daily as needed (Bladder spasm/pain). 30 tablet 5    sodium,potassium,mag sulfates (SUPREP BOWEL PREP KIT) 17.5-3.13-1.6 gram SolR Take 177 mLs by mouth once daily. 1 kit 0    URIBEL 118-10-40.8-36 mg Cap Take 1 capsule by mouth 4 (four) times daily as needed (bladder pain). 30 capsule 11    VRAYLAR 3 mg Cap Take 3 mg by mouth every evening.       No current facility-administered medications for this visit.     Facility-Administered Medications Ordered in Other Visits   Medication Dose Route Frequency Provider Last Rate Last Admin    diphenhydrAMINE injection 25 mg  25 mg Intravenous Q6H PRN Selvin Villarreal MD        HYDROmorphone injection 0.5 mg  0.5 mg Intravenous Q5 Min PRN Selvin Villarreal MD   0.5 mg at 12/14/21 1337    LIDOcaine-EPINEPHrine (PF) 1%-1:200,000 injection 20 mL  20 mL Subcutaneous Once Tessie Donald MD        lorazepam injection 0.25 mg  0.25 mg Intravenous Once PRN Selvin Villarreal MD        sodium chloride 0.9% bolus 250 mL  250 mL Intravenous Once Selvin Villarreal MD           Review of Systems    Answers submitted by the patient for this visit:  Rheumatology Questionnaire (Submitted on 2/20/2024)  fever: No  eye redness: No  mouth sores: No  headaches: Yes  shortness of breath: No  chest pain: No  trouble swallowing: No  diarrhea: No  constipation: No  unexpected weight change: No  genital sore: No  dysuria: No  During the last 3 days, have you had a skin rash?: Yes  Bruises or bleeds easily: No  cough: No     Objective:      LMP 02/10/2022   General: alert, appears stated age, cooperative, and fatigued   Lungs:  clear to auscultation bilaterally    Heart:   regular rate and rhythm, S1, S2 normal, no murmur, click, rub or gallop, regularly  irregular rhythm, and S1, S2 normal   Abdomen:   normal findings: no bruits heard   Swollen joints:   yes - 2,4,5 MCP on L hand and 2,3,R hand MCP   Tender joints:   yes - same as swollen    Joint Review:   ( 0=Absent, 1=Present )  Shoulder:   Left: Pain-1; Swelling-0, Right:  Pain-1; Swelling-0   Elbow:   Left: Pain-1; Swelling-1, Right:  Pain-1; Swelling-0   Wrist:   Left: Pain-0; Swelling-0, Right: Pain-0; Swelling-0   MCP I:  Left: Pain-0; Swelling-0, Right: Pain-0; Swelling-0   MCP II:  Left: Pain-1; Swelling-1, Right:  Pain-1; Swelling-1   MCP III:  Left: Pain-0; Swelling-0, Right:  Pain-1; Swelling-1   MCP IV:  Left: Pain-1; Swelling-1, Right: Pain-0; Swelling-0   MCP V:  Left: Pain-1; Swelling-1, Right: Pain-0; Swelling-0   PIP I:  Left: Pain-0; Swelling-0, Right: Pain-0; Swelling-0   PIP II:  Left: Pain-0; Swelling-0, Right: Pain-0; Swelling-0   PIP III:  Left: Pain-0; Swelling-0, Right: Pain-0; Swelling-0   PIP IV:  Left: Pain-0; Swelling-0, Right: Pain-0; Swelling-0   PIP V:  Left: Pain-0; Swelling-0, Right: Pain-0; Swelling-0   Knee:   Left: Pain-1; Swelling-1, Right:  Pain-1; Swelling-1   Ankle:   Left: Pain-1; Swelling-1, Right:  Pain-1; Swelling-1   MTP I:  Left: Pain-1; Swelling-0   MTP II:  Right:  Pain-1; Swelling-0   MTP III:  Right:  Pain-1; Swelling-0   MTP IV:  Right:  Pain-1; Swelling-0   MTP V:  Right:  Pain-1; Swelling-0       strength:  right and left difficulty making a fist    Tinels Test +:   Right:  not done  Left:  not done   Change in Nodules  no   Normal Neck ROM:  yes     Imaging  No results found for this or any previous visit.    No results found for this or any previous visit.    Results for orders placed during the hospital encounter of 01/09/24    X-Ray Chest PA And Lateral    Narrative  EXAMINATION:  XR CHEST PA AND LATERAL    CLINICAL HISTORY:  Rheumatoid arthritis, unspecified    TECHNIQUE:  PA and lateral views of the chest were  performed.    COMPARISON:  06/13/2023    FINDINGS:  There is no consolidation, effusion, or pneumothorax.  Cardiomediastinal silhouette is unremarkable.  Regional osseous structures are unremarkable.    Impression  No acute cardiopulmonary process.      Electronically signed by: Joshua Oseguera MD  Date:    01/09/2024  Time:    16:48      Lab Review    Results for orders placed or performed in visit on 02/08/25   CBC Auto Differential    Collection Time: 02/08/25 10:15 AM   Result Value Ref Range    WBC 5.72 3.90 - 12.70 K/uL    RBC 4.02 4.00 - 5.40 M/uL    Hemoglobin 12.9 12.0 - 16.0 g/dL    Hematocrit 37.0 37.0 - 48.5 %    MCV 92 82 - 98 fL    MCH 32.1 (H) 27.0 - 31.0 pg    MCHC 34.9 32.0 - 36.0 g/dL    RDW 13.3 11.5 - 14.5 %    Platelets 198 150 - 450 K/uL    MPV 9.3 9.2 - 12.9 fL    Immature Granulocytes 0.2 0.0 - 0.5 %    Gran # (ANC) 3.6 1.8 - 7.7 K/uL    Immature Grans (Abs) 0.01 0.00 - 0.04 K/uL    Lymph # 1.3 1.0 - 4.8 K/uL    Mono # 0.6 0.3 - 1.0 K/uL    Eos # 0.2 0.0 - 0.5 K/uL    Baso # 0.04 0.00 - 0.20 K/uL    nRBC 0 0 /100 WBC    Gran % 62.8 38.0 - 73.0 %    Lymph % 22.2 18.0 - 48.0 %    Mono % 11.0 4.0 - 15.0 %    Eosinophil % 3.1 0.0 - 8.0 %    Basophil % 0.7 0.0 - 1.9 %    Differential Method Automated      Results for orders placed or performed in visit on 02/08/25   Comprehensive Metabolic Panel    Collection Time: 02/08/25 10:15 AM   Result Value Ref Range    Sodium 139 136 - 145 mmol/L    Potassium 3.7 3.5 - 5.1 mmol/L    Chloride 107 95 - 110 mmol/L    CO2 21 (L) 23 - 29 mmol/L    Glucose 65 (L) 70 - 110 mg/dL    BUN 13 6 - 20 mg/dL    Creatinine 0.7 0.5 - 1.4 mg/dL    Calcium 8.8 8.7 - 10.5 mg/dL    Total Protein 7.6 6.0 - 8.4 g/dL    Albumin 4.3 3.5 - 5.2 g/dL    Total Bilirubin 0.8 0.1 - 1.0 mg/dL    Alkaline Phosphatase 75 40 - 150 U/L    AST 13 10 - 40 U/L    ALT 15 10 - 44 U/L    eGFR >60.0 >60 mL/min/1.73 m^2    Anion Gap 11 8 - 16 mmol/L     Results for orders placed or performed in  visit on 02/08/25   Sedimentation rate    Collection Time: 02/08/25 10:15 AM   Result Value Ref Range    Sed Rate 5 0 - 20 mm/Hr     Results for orders placed or performed in visit on 02/08/25   C-Reactive Protein    Collection Time: 02/08/25 10:15 AM   Result Value Ref Range    CRP 2.9 0.0 - 8.2 mg/L     No results found for this or any previous visit.  Results for orders placed or performed in visit on 07/06/23   ELAINE Screen w/Reflex    Collection Time: 07/06/23 11:24 AM   Result Value Ref Range    ELAINE Screen Positive (A) Negative <1:80     Results for orders placed or performed in visit on 01/09/24   Rheumatoid Factor    Collection Time: 01/09/24  3:40 PM   Result Value Ref Range    Rheumatoid Factor 32.0 (H) 0.0 - 15.0 IU/mL       Eye Exam:  If On Plaquenil - not on HCQ         Assessment:      Rheumatoid Arthritis worsening  Remission:  no  CHRISTAL-DI: done      Plan:   33 yr old female with pmhx of RA diagnosed 6 years ago, placed on methotrexate with no improvement:   RA   ELAINE positive   Myofascial Pain Syndrome   RP  Rash    -  Medications: at this time I advised to not take any NSAIDs until hepatosplenomegaly is resolved.  add Etodolac since Ibuprofen, Naproxen, Diclofenac and tramadol did not work to see if it will help with inflammation (she is getting  this week) without causing side effects - see Rx below, discussed risks (side effects) and benefits of medication & encouraged to read about medication  - she has photosensitive rash that could be explained by a lupus like reaction   - Continue Benlysta for SLE and arthritis   - I will change medication until we have more info about hepatosplenomegaly -- she has appt in 2 mo [facility] for fibroscan   - Labs:  CBC, CRP, Hepatitis Panel, ELAINE, Anti-Smith, Anti RNP, Anti-DS DNA, C3, C4, and sed rate and crp and TB testing - all normal   - Imaging: X-ray hands for baseline disease-- normal   - Discussion and guidance: Discussed importance of taking medication as  directed, Discussed pros and cons and risks of joint injections, Recommended rest for painful and swollen joints, Recommended regular exercise program once pain and swelling is dec, also explained why we have to wait to start immunosuppression until after we received blood work back     Follow-up In 3 months.  Call if worsening or not improving.  The patient does not have a documented glucocorticoid management plan within the past 12 months. 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  Greater than half the time spent was used for coordination of care and counseling of patient.     Radha Rodrigues M.D.  Rheumatology Department   [facility]      Answers submitted by the patient for this visit:  Rheumatology Questionnaire (Submitted on 5/21/2024)  fever: No  eye redness: No  mouth sores: No  headaches: Yes  shortness of breath: No  chest pain: No  trouble swallowing: No  diarrhea: No  constipation: Yes  unexpected weight change: No  genital sore: No  dysuria: Yes  During the last 3 days, have you had a skin rash?: No  Bruises or bleeds easily: No  cough: No      Answers submitted by the patient for this visit:  Rheumatology Questionnaire (Submitted on 8/22/2024)  fever: No  eye redness: Yes  mouth sores: Yes  headaches: Yes  shortness of breath: No  chest pain: No  trouble swallowing: Yes  diarrhea: No  constipation: No  unexpected weight change: Yes  genital sore: No  dysuria: No  During the last 3 days, have you had a skin rash?: No  Bruises or bleeds easily: No  cough: No      Answers submitted by the patient for this visit:  Rheumatology  Questionnaire (Submitted on 3/3/2025)  fever: No  eye redness: No  mouth sores: Yes  headaches: Yes  shortness of breath: No  chest pain: No  trouble swallowing: No  diarrhea: No  constipation: No  unexpected weight change: Yes  genital sore: No  dysuria: No  During the last 3 days, have you had a skin rash?: No  Bruises or bleeds easily: No  cough: No

## 2025-03-11 ENCOUNTER — HOSPITAL ENCOUNTER (OUTPATIENT)
Dept: RADIOLOGY | Facility: HOSPITAL | Age: 35
Discharge: HOME | End: 2025-03-11
Attending: STUDENT IN AN ORGANIZED HEALTH CARE EDUCATION/TRAINING PROGRAM
Payer: MEDICAID

## 2025-03-11 DIAGNOSIS — M06.9 RHEUMATOID ARTHRITIS, INVOLVING UNSPECIFIED SITE, UNSPECIFIED WHETHER RHEUMATOID FACTOR PRESENT: ICD-10-CM

## 2025-03-11 PROCEDURE — 73600 X-RAY EXAM OF ANKLE: CPT | Mod: TC,50

## 2025-03-11 PROCEDURE — 73600 X-RAY EXAM OF ANKLE: CPT | Mod: 26,50,, | Performed by: RADIOLOGY

## 2025-03-12 ENCOUNTER — OFFICE VISIT (OUTPATIENT)
Dept: OBSTETRICS AND GYNECOLOGY | Facility: CLINIC | Age: 35
End: 2025-03-12
Payer: MEDICAID

## 2025-03-12 VITALS
HEIGHT: 64 IN | WEIGHT: 211.88 LBS | SYSTOLIC BLOOD PRESSURE: 120 MMHG | DIASTOLIC BLOOD PRESSURE: 80 MMHG | BODY MASS INDEX: 36.17 KG/M2

## 2025-03-12 DIAGNOSIS — R10.2 PELVIC PAIN: Primary | ICD-10-CM

## 2025-03-12 PROCEDURE — 3074F SYST BP LT 130 MM HG: CPT | Mod: CPTII,,, | Performed by: STUDENT IN AN ORGANIZED HEALTH CARE EDUCATION/TRAINING PROGRAM

## 2025-03-12 PROCEDURE — 99213 OFFICE O/P EST LOW 20 MIN: CPT | Mod: S$PBB,,, | Performed by: STUDENT IN AN ORGANIZED HEALTH CARE EDUCATION/TRAINING PROGRAM

## 2025-03-12 PROCEDURE — 1159F MED LIST DOCD IN RCRD: CPT | Mod: CPTII,,, | Performed by: STUDENT IN AN ORGANIZED HEALTH CARE EDUCATION/TRAINING PROGRAM

## 2025-03-12 PROCEDURE — 3079F DIAST BP 80-89 MM HG: CPT | Mod: CPTII,,, | Performed by: STUDENT IN AN ORGANIZED HEALTH CARE EDUCATION/TRAINING PROGRAM

## 2025-03-12 PROCEDURE — 99999 PR PBB SHADOW E&M-EST. PATIENT-LVL IV: CPT | Mod: PBBFAC,,, | Performed by: STUDENT IN AN ORGANIZED HEALTH CARE EDUCATION/TRAINING PROGRAM

## 2025-03-12 PROCEDURE — 3008F BODY MASS INDEX DOCD: CPT | Mod: CPTII,,, | Performed by: STUDENT IN AN ORGANIZED HEALTH CARE EDUCATION/TRAINING PROGRAM

## 2025-03-12 PROCEDURE — 99214 OFFICE O/P EST MOD 30 MIN: CPT | Mod: PBBFAC,PN | Performed by: STUDENT IN AN ORGANIZED HEALTH CARE EDUCATION/TRAINING PROGRAM
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
## 2025-03-12 NOTE — Clinical Note
Can schedule lap right oophrectomy. First assist. Dx is pelvic pain. Can be at surgery center. 7am case. Can bring back for preop  .

## 2025-03-15 DIAGNOSIS — M06.9 RHEUMATOID ARTHRITIS, INVOLVING UNSPECIFIED SITE, UNSPECIFIED WHETHER RHEUMATOID FACTOR PRESENT: Primary | ICD-10-CM

## 2025-03-16 ENCOUNTER — HOSPITAL ENCOUNTER (OUTPATIENT)
Dept: RADIOLOGY | Facility: HOSPITAL | Age: 35
Discharge: HOME | End: 2025-03-16
Attending: STUDENT IN AN ORGANIZED HEALTH CARE EDUCATION/TRAINING PROGRAM
Payer: MEDICAID

## 2025-03-16 DIAGNOSIS — M06.9 RHEUMATOID ARTHRITIS, INVOLVING UNSPECIFIED SITE, UNSPECIFIED WHETHER RHEUMATOID FACTOR PRESENT: ICD-10-CM

## 2025-03-16 PROCEDURE — 72082 X-RAY EXAM ENTIRE SPI 2/3 VW: CPT | Mod: TC

## 2025-03-16 PROCEDURE — 72082 X-RAY EXAM ENTIRE SPI 2/3 VW: CPT | Mod: 26,,, | Performed by: RADIOLOGY

## 2025-03-17 ENCOUNTER — PATIENT MESSAGE (OUTPATIENT)
Dept: RHEUMATOLOGY | Facility: CLINIC | Age: 35
End: 2025-03-17
Payer: MEDICAID

## 2025-03-19 ENCOUNTER — PATIENT MESSAGE (OUTPATIENT)
Dept: RHEUMATOLOGY | Facility: CLINIC | Age: 35
End: 2025-03-19
Payer: MEDICAID

## 2025-03-19 DIAGNOSIS — R10.2 PELVIC PAIN: Primary | ICD-10-CM

## 2025-03-20 ENCOUNTER — PATIENT MESSAGE (OUTPATIENT)
Dept: OBSTETRICS AND GYNECOLOGY | Facility: CLINIC | Age: 35
End: 2025-03-20
Payer: MEDICAID

## 2025-03-22 DIAGNOSIS — M25.572 PAIN OF JOINT OF LEFT ANKLE AND FOOT: Primary | ICD-10-CM

## 2025-03-24 ENCOUNTER — PATIENT MESSAGE (OUTPATIENT)
Dept: OBSTETRICS AND GYNECOLOGY | Facility: CLINIC | Age: 35
End: 2025-03-24
Payer: MEDICAID

## 2025-03-25 ENCOUNTER — HOSPITAL ENCOUNTER (OUTPATIENT)
Dept: RADIOLOGY | Facility: HOSPITAL | Age: 35
Discharge: HOME | End: 2025-03-25
Attending: STUDENT IN AN ORGANIZED HEALTH CARE EDUCATION/TRAINING PROGRAM
Payer: MEDICAID

## 2025-03-25 DIAGNOSIS — M25.572 PAIN OF JOINT OF LEFT ANKLE AND FOOT: ICD-10-CM

## 2025-03-25 PROCEDURE — 73721 MRI JNT OF LWR EXTRE W/O DYE: CPT | Mod: 26,LT,, | Performed by: RADIOLOGY

## 2025-03-25 PROCEDURE — 73721 MRI JNT OF LWR EXTRE W/O DYE: CPT | Mod: TC,LT

## 2025-03-27 ENCOUNTER — PATIENT MESSAGE (OUTPATIENT)
Dept: ADMINISTRATIVE | Facility: OTHER | Age: 35
End: 2025-03-27
Payer: MEDICAID

## 2025-04-04 ENCOUNTER — OFFICE VISIT (OUTPATIENT)
Dept: RHEUMATOLOGY | Facility: CLINIC | Age: 35
End: 2025-04-04
Payer: MEDICAID

## 2025-04-04 VITALS
SYSTOLIC BLOOD PRESSURE: 137 MMHG | HEART RATE: 109 BPM | TEMPERATURE: 100 F | DIASTOLIC BLOOD PRESSURE: 93 MMHG | BODY MASS INDEX: 34.06 KG/M2 | HEIGHT: 64 IN | WEIGHT: 199.5 LBS | OXYGEN SATURATION: 98 %

## 2025-04-04 DIAGNOSIS — M32.9 SLE (SYSTEMIC LUPUS ERYTHEMATOSUS RELATED SYNDROME): Primary | ICD-10-CM

## 2025-04-04 DIAGNOSIS — M79.18 MYOFASCIAL PAIN SYNDROME: ICD-10-CM

## 2025-04-04 DIAGNOSIS — M06.9 RHEUMATOID ARTHRITIS, INVOLVING UNSPECIFIED SITE, UNSPECIFIED WHETHER RHEUMATOID FACTOR PRESENT: ICD-10-CM

## 2025-04-04 DIAGNOSIS — G90.522 COMPLEX REGIONAL PAIN SYNDROME TYPE 1 OF LEFT LOWER EXTREMITY: ICD-10-CM

## 2025-04-04 DIAGNOSIS — R20.2 PARESTHESIA: ICD-10-CM

## 2025-04-04 DIAGNOSIS — R21 RASH: ICD-10-CM

## 2025-04-04 DIAGNOSIS — M25.572 PAIN OF JOINT OF LEFT ANKLE AND FOOT: ICD-10-CM

## 2025-04-04 DIAGNOSIS — R76.8 ANA POSITIVE: ICD-10-CM

## 2025-04-04 DIAGNOSIS — I73.00 RAYNAUD'S DISEASE WITHOUT GANGRENE: ICD-10-CM

## 2025-04-04 PROCEDURE — 99999 PR PBB SHADOW E&M-EST. PATIENT-LVL V: CPT | Mod: PBBFAC,,, | Performed by: STUDENT IN AN ORGANIZED HEALTH CARE EDUCATION/TRAINING PROGRAM

## 2025-04-04 PROCEDURE — 99215 OFFICE O/P EST HI 40 MIN: CPT | Mod: PBBFAC,PO | Performed by: STUDENT IN AN ORGANIZED HEALTH CARE EDUCATION/TRAINING PROGRAM

## 2025-04-04 NOTE — PROGRESS NOTES
Answers submitted by the patient for this visit:  Rheumatology Questionnaire (Submitted on 3/31/2025)  fever: No  eye redness: No  mouth sores: Yes  headaches: Yes  shortness of breath: Yes  chest pain: No  trouble swallowing: No  diarrhea: No  constipation: No  unexpected weight change: No  genital sore: No  dysuria: No  During the last 3 days, have you had a skin rash?: Yes  Bruises or bleeds easily: No  cough: No      Subjective:       Rosa Crowley is a 34 y.o. female who was referred by Selvin Morocho for evaluation of rheumatoid arthritis.  Symptoms have been present for 6 year. Onset was sudden. Symptoms include joint pain (worse at night), joint stiffness (worse in the AM), sleep disturbance due to pain, fatigue, and joint swelling in ankles, knees and hands and are severe in nature. Patient denies eye symptoms and skin nodules. Symptoms are made worse by: cold exposure, lying down, movement, overhead work, overuse, and resting.  Symptoms are helped by OTC pain medications (Ibuprofen 800mg and Tramadol- they help her sleep at night but do not take all discomfort away). Associated symptoms include arthralgia, fatigue, joint pain, morning stiffness, nausea, oral ulcers, rashes/photosensitive, and Raynaud's. Patient denies associated arthralgia, depression, fatigue, joint pain, morning stiffness, nausea, oral ulcers, rashes/photosensitive, and Raynaud's in the toes not in fingers. Overall disease activity is characterized as worse.  Limitation on activities include difficulty performing her job(works at ) and too fatigue to do anything after work - she has 2 girls and this has affected them too.   Cannot take steroids due to bipolar disease- steroid psychosis     Last appt:Patient doing Enbrel and is working with less fatigue and less joint pain. She is having photosensitive rash everyday. Also feel early satiety and bloated stomach - went to ED and CT abdomen showing hepatosplenomegaly  with normal LFTs and CBC. Hematology had a virtual appointment and told patient that there is nothing that was concerning to them. Has appt with hepatology     Last appointment: Patient here fro follow up of SLE and RA  Patient still on Enbrel but having R elbow and wrist pain and swelling   She just had breast duct removal in both breasts- she is in significant discomfort     3/10/2025: Patient here for follow up of SLE and RA   Patient reports that she has been having joint pain and stiffness  Started Benlysta and helping. Rash improved some     04/04/2025: Patient here for follow-up of SLE and rheumatoid arthritis  Patient reports that the initial improvement she had with Benlysta is not happening at this time she is having joint pain and swelling the only thing that lasted more was the rash but now she feels like his coming back  One of her main problems is also numbness and tingling in both hands and feet  She continues to have left-sided ankle pain and swelling despite we do in an MRI which showed no abnormalities  Patient reports she has not had any injuries, falls, trauma to the left ankl patient reports overall she has not been feeling well  Patient will go for ovary removal on 04/14/2025 due to cyst- she will do Benlysta today    Disease History:  Seropositive:  yes  Erosive:  unknown will send proper work up today   Radiographic stage:  unknown will send for xrays today   Functional class:  II    Previous therapies: Patient has had a course of DMARDs in the past- methotrexate but did not improve disease  Disease activity: assessed this visit.  Functional status: assessed this visit.  Disease prognosis: good.    Previous Report(s) Reviewed: ER records, imaging reports:  lab reports, and x-ray reports      Pain in R side- -back area   Ring around ankles -- dark ring   Swollen ankle   General foot pain   Now calmer - better   Once or twice a week ulcers   Hand joint swelling   Headache about everyday since 3  weeks ago -- side in the back   Eye doctor and retinal specialist -- all good every 6  months   High myopia degeneration   Current Outpatient Medications   Medication Sig Dispense Refill    albuterol (PROVENTIL/VENTOLIN HFA) 90 mcg/actuation inhaler Inhale 2 puffs into the lungs every 4 (four) hours as needed for Shortness of Breath or Wheezing. 18 g 11    amitriptyline (ELAVIL) 10 MG tablet TAKE 1 TABLET(10 MG) BY MOUTH EVERY NIGHT AS NEEDED FOR PAIN 30 tablet 11    belimumab (BENLYSTA) 200 mg/mL AtIn Inject 1 mL (200 mg total) into the skin every 7 days. 4 mL 11    buPROPion (WELLBUTRIN XL) 150 MG TB24 tablet Take 150 mg by mouth every evening.      butalbital-acetaminophen-caffeine -40 mg (FIORICET, ESGIC) -40 mg per tablet TAKE 1 TABLET BY MOUTH TWICE DAILY AS NEEDED FOR HEADACHE 30 tablet 0    clonazePAM (KLONOPIN) 0.5 MG tablet Take 0.5 mg by mouth 2 (two) times daily as needed.      dextroamphetamine-amphetamine (ADDERALL) 10 mg Tab Take 10 mg by mouth before evening meal. 400pm      dextroamphetamine-amphetamine 30 mg Tab Take 1 tablet by mouth 2 (two) times daily.      diclofenac sodium (VOLTAREN) 1 % Gel Apply to painful finger  g 2    drospirenone, contraceptive, 4 mg (28) Tab Take 1 tablet (4 mg total) by mouth Daily. 28 tablet 11    hydroxychloroquine (PLAQUENIL) 200 mg tablet Take 2 tablets (400 mg total) by mouth once daily. 60 tablet 11    hydrOXYzine HCL (ATARAX) 25 MG tablet Take 1 tablet (25 mg total) by mouth 4 (four) times daily as needed for Itching. 45 tablet 2    hyoscyamine (ANASPAZ,LEVSIN) 0.125 mg Tab Take 1 tablet (125 mcg total) by mouth every 4 (four) hours as needed. 30 tablet 0    linaCLOtide (LINZESS) 290 mcg Cap capsule Take 1 capsule (290 mcg total) by mouth before breakfast. 90 capsule 3    methen-m.blue-s.phos-phsal-hyo (URIBEL) 118-10-40.8-36 mg Cap Take 1 capsule by mouth 4 (four) times daily as needed (Bladder pain). 30 capsule 11    natrexone tablet 4 mg  Take 2mg by mouth once a day for 7 days, then increase to 4mg by mouth once a day. 30 tablet 0    ondansetron (ZOFRAN-ODT) 8 MG TbDL Take 1 tablet (8 mg total) by mouth 2 (two) times daily. 20 tablet 0    URIBEL 118-10-40.8-36 mg Cap Take 1 capsule by mouth 4 (four) times daily as needed (bladder pain). 30 capsule 11    VRAYLAR 3 mg Cap Take 3 mg by mouth every evening.      busPIRone (BUSPAR) 15 MG tablet Take 1 tablet (15 mg total) by mouth 3 (three) times daily. (Patient not taking: Reported on 2/11/2025) 60 tablet 1    gabapentin (NEURONTIN) 300 MG capsule Take 1 capsule (300 mg total) by mouth 3 (three) times daily. for 10 days 30 capsule 0    oxybutynin (DITROPAN) 5 MG Tab Take 1 tablet (5 mg total) by mouth 3 (three) times daily as needed (Bladder spasm/pain). 30 tablet 5     No current facility-administered medications for this visit.     Facility-Administered Medications Ordered in Other Visits   Medication Dose Route Frequency Provider Last Rate Last Admin    diphenhydrAMINE injection 25 mg  25 mg Intravenous Q6H PRN Selvin Villarreal MD        HYDROmorphone injection 0.5 mg  0.5 mg Intravenous Q5 Min PRN Selvin Villarreal MD   0.5 mg at 12/14/21 1337    LIDOcaine-EPINEPHrine (PF) 1%-1:200,000 injection 20 mL  20 mL Subcutaneous Once Tessie Donald MD        lorazepam injection 0.25 mg  0.25 mg Intravenous Once PRN Selvin Villarreal MD        sodium chloride 0.9% bolus 250 mL  250 mL Intravenous Once Selvin Villarreal MD           Review of Systems    Answers submitted by the patient for this visit:  Rheumatology Questionnaire (Submitted on 2/20/2024)  fever: No  eye redness: No  mouth sores: No  headaches: Yes  shortness of breath: No  chest pain: No  trouble swallowing: No  diarrhea: No  constipation: No  unexpected weight change: No  genital sore: No  dysuria: No  During the last 3 days, have you had a skin rash?: Yes  Bruises or bleeds easily: No  cough: No     Objective:      BP  "(!) 137/93 (BP Location: Right arm, Patient Position: Sitting)   Pulse 109   Temp 99.5 °F (37.5 °C) (Oral)   Ht 5' 4" (1.626 m)   Wt 90.5 kg (199 lb 8.3 oz)   LMP 02/10/2022   SpO2 98%    L/min   BMI 34.25 kg/m²   General: alert, appears stated age, cooperative, and fatigued   Lungs:  clear to auscultation bilaterally    Heart:   regular rate and rhythm, S1, S2 normal, no murmur, click, rub or gallop, regularly irregular rhythm, and S1, S2 normal   Abdomen:   normal findings: no bruits heard   Swollen joints:   yes - 2,4,5 MCP on L hand and 2,3,R hand MCP   Tender joints:   yes - same as swollen    Joint Review:   ( 0=Absent, 1=Present )  Shoulder:   Left: Pain-1; Swelling-0, Right:  Pain-1; Swelling-0   Elbow:   Left: Pain-1; Swelling-1, Right:  Pain-1; Swelling-0   Wrist:   Left: Pain-0; Swelling-0, Right: Pain-0; Swelling-0   MCP I:  Left: Pain-0; Swelling-0, Right: Pain-0; Swelling-0   MCP II:  Left: Pain-1; Swelling-1, Right:  Pain-1; Swelling-1   MCP III:  Left: Pain-0; Swelling-0, Right:  Pain-1; Swelling-1   MCP IV:  Left: Pain-1; Swelling-1, Right: Pain-0; Swelling-0   MCP V:  Left: Pain-1; Swelling-1, Right: Pain-0; Swelling-0   PIP I:  Left: Pain-0; Swelling-0, Right: Pain-0; Swelling-0   PIP II:  Left: Pain-0; Swelling-0, Right: Pain-0; Swelling-0   PIP III:  Left: Pain-0; Swelling-0, Right: Pain-0; Swelling-0   PIP IV:  Left: Pain-0; Swelling-0, Right: Pain-0; Swelling-0   PIP V:  Left: Pain-0; Swelling-0, Right: Pain-0; Swelling-0   Knee:   Left: Pain-1; Swelling-1, Right:  Pain-1; Swelling-1   Ankle:   Left: Pain-1; Swelling-1, Right:  Pain-1; Swelling-1   MTP I:  Left: Pain-1; Swelling-0   MTP II:  Right:  Pain-1; Swelling-0   MTP III:  Right:  Pain-1; Swelling-0   MTP IV:  Right:  Pain-1; Swelling-0   MTP V:  Right:  Pain-1; Swelling-0       strength:  right and left difficulty making a fist    Tinels Test +:   Right:  not done  Left:  not done   Change in Nodules  no   Normal " Neck ROM:  yes     Imaging  No results found for this or any previous visit.    No results found for this or any previous visit.    Results for orders placed during the hospital encounter of 01/09/24    X-Ray Chest PA And Lateral    Narrative  EXAMINATION:  XR CHEST PA AND LATERAL    CLINICAL HISTORY:  Rheumatoid arthritis, unspecified    TECHNIQUE:  PA and lateral views of the chest were performed.    COMPARISON:  06/13/2023    FINDINGS:  There is no consolidation, effusion, or pneumothorax.  Cardiomediastinal silhouette is unremarkable.  Regional osseous structures are unremarkable.    Impression  No acute cardiopulmonary process.      Electronically signed by: Joshua Oseguera MD  Date:    01/09/2024  Time:    16:48      Lab Review    Results for orders placed or performed in visit on 02/08/25   CBC Auto Differential    Collection Time: 02/08/25 10:15 AM   Result Value Ref Range    WBC 5.72 3.90 - 12.70 K/uL    RBC 4.02 4.00 - 5.40 M/uL    Hemoglobin 12.9 12.0 - 16.0 g/dL    Hematocrit 37.0 37.0 - 48.5 %    MCV 92 82 - 98 fL    MCH 32.1 (H) 27.0 - 31.0 pg    MCHC 34.9 32.0 - 36.0 g/dL    RDW 13.3 11.5 - 14.5 %    Platelets 198 150 - 450 K/uL    MPV 9.3 9.2 - 12.9 fL    Immature Granulocytes 0.2 0.0 - 0.5 %    Gran # (ANC) 3.6 1.8 - 7.7 K/uL    Immature Grans (Abs) 0.01 0.00 - 0.04 K/uL    Lymph # 1.3 1.0 - 4.8 K/uL    Mono # 0.6 0.3 - 1.0 K/uL    Eos # 0.2 0.0 - 0.5 K/uL    Baso # 0.04 0.00 - 0.20 K/uL    nRBC 0 0 /100 WBC    Gran % 62.8 38.0 - 73.0 %    Lymph % 22.2 18.0 - 48.0 %    Mono % 11.0 4.0 - 15.0 %    Eosinophil % 3.1 0.0 - 8.0 %    Basophil % 0.7 0.0 - 1.9 %    Differential Method Automated      Results for orders placed or performed in visit on 02/08/25   Comprehensive Metabolic Panel    Collection Time: 02/08/25 10:15 AM   Result Value Ref Range    Sodium 139 136 - 145 mmol/L    Potassium 3.7 3.5 - 5.1 mmol/L    Chloride 107 95 - 110 mmol/L    CO2 21 (L) 23 - 29 mmol/L    Glucose 65 (L) 70 - 110 mg/dL     BUN 13 6 - 20 mg/dL    Creatinine 0.7 0.5 - 1.4 mg/dL    Calcium 8.8 8.7 - 10.5 mg/dL    Total Protein 7.6 6.0 - 8.4 g/dL    Albumin 4.3 3.5 - 5.2 g/dL    Total Bilirubin 0.8 0.1 - 1.0 mg/dL    Alkaline Phosphatase 75 40 - 150 U/L    AST 13 10 - 40 U/L    ALT 15 10 - 44 U/L    eGFR >60.0 >60 mL/min/1.73 m^2    Anion Gap 11 8 - 16 mmol/L     Results for orders placed or performed in visit on 02/08/25   Sedimentation rate    Collection Time: 02/08/25 10:15 AM   Result Value Ref Range    Sed Rate 5 0 - 20 mm/Hr     Results for orders placed or performed in visit on 02/08/25   C-Reactive Protein    Collection Time: 02/08/25 10:15 AM   Result Value Ref Range    CRP 2.9 0.0 - 8.2 mg/L     No results found for this or any previous visit.  Results for orders placed or performed in visit on 07/06/23   ELAINE Screen w/Reflex    Collection Time: 07/06/23 11:24 AM   Result Value Ref Range    ELAINE Screen Positive (A) Negative <1:80     Results for orders placed or performed in visit on 01/09/24   Rheumatoid Factor    Collection Time: 01/09/24  3:40 PM   Result Value Ref Range    Rheumatoid Factor 32.0 (H) 0.0 - 15.0 IU/mL       Eye Exam:  If On Plaquenil - not on HCQ         Assessment:      Rheumatoid Arthritis worsening  Remission:  no  CHRISTAL-DI: done      Plan:   33 yr old female with pmhx of RA diagnosed 6 years ago, placed on methotrexate with no improvement:   RA   ELAINE positive   Myofascial Pain Syndrome   RP  Rash  Left ankle pain and swelling  Paresthesias    -  Medications: at this time I advised to not take any NSAIDs until hepatosplenomegaly is resolved.  add Etodolac since Ibuprofen, Naproxen, Diclofenac and tramadol did not work to see if it will help with inflammation without causing side effects - see Rx below, discussed risks (side effects) and benefits of medication & encouraged to read about medication  - she has photosensitive rash that could be explained by a lupus like reaction   - Continue Benlysta for SLE and  arthritis but after surgery we when changed to saphnelo  - Labs:  CBC, CRP, Hepatitis Panel, ELAINE, Anti-Smith, Anti RNP, Anti-DS DNA, C3, C4, and sed rate and crp and TB testing - all normal   - Imaging: X-ray hands for baseline disease-- normal   - x-ray for left ankle an MRI for left ankle normal  Patient is having paresthesia symptoms we will send for EMG  - Discussion and guidance: Discussed importance of taking medication as directed, Discussed pros and cons and risks of joint injections, Recommended rest for painful and swollen joints, Recommended regular exercise program once pain and swelling is dec, also explained why we have to wait to start immunosuppression until after we received blood work back   - we will order bone scan to evaluate for CRPS of left ankle  - we will try low-dose naltrexone 4 mg for generalized body pain    1. Complex regional pain syndrome type 1 of left lower extremity    - EMG W/ ULTRASOUND AND NERVE CONDUCTION TEST 4 Extremities; Future  - NM Bone Scan Whole Body; Future    2. Myofascial pain syndrome    - natrexone tablet 4 mg; Take 2mg by mouth once a day for 7 days, then increase to 4mg by mouth once a day.  Dispense: 30 tablet; Refill: 0      9. Paresthesia    - EMG W/ ULTRASOUND AND NERVE CONDUCTION TEST 4 Extremities; Future      Follow-up In 3 months.  Call if worsening or not improving.  The patient does not have a documented glucocorticoid management plan within the past 12 months. 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  Greater than half the time spent was used for coordination of care and counseling of patient.     Radha  ESMER Rodrigues.  Rheumatology Department   [facility]    Today's visit is associated with current or anticipated ongoing medical care related to this patient's diagnosis of SLE, which is a chronic disease which will require regular follow up to manage symptoms and progression. The patient is to return to the office within a minimum of 3-6 months to review symptoms and function at that time. CPT code       Answers submitted by the patient for this visit:  Rheumatology Questionnaire (Submitted on 3/3/2025)  fever: No  eye redness: No  mouth sores: Yes  headaches: Yes  shortness of breath: No  chest pain: No  trouble swallowing: No  diarrhea: No  constipation: No  unexpected weight change: Yes  genital sore: No  dysuria: No  During the last 3 days, have you had a skin rash?: No  Bruises or bleeds easily: No  cough: No

## (undated) DEVICE — TRAY ENT 4/CS

## (undated) DEVICE — GOWN SMART IMP BREATHABLE XXLG

## (undated) DEVICE — CATH ALL PUR URTHL RR 10FR

## (undated) DEVICE — SUCTION COAGULATOR 10FR 6IN

## (undated) DEVICE — ELECTRODE REM PLYHSV RETURN 9

## (undated) DEVICE — PENCIL ROCKER SWITCH 10FT CORD

## (undated) DEVICE — SHEET EENT SPLIT

## (undated) DEVICE — SEE MEDLINE ITEM 146417

## (undated) DEVICE — ELECTRODE BLADE INSULATED 1 IN

## (undated) DEVICE — SPONGE TONSIL LARGE